# Patient Record
Sex: MALE | Race: WHITE | NOT HISPANIC OR LATINO | Employment: UNEMPLOYED | URBAN - METROPOLITAN AREA
[De-identification: names, ages, dates, MRNs, and addresses within clinical notes are randomized per-mention and may not be internally consistent; named-entity substitution may affect disease eponyms.]

---

## 2018-02-02 ENCOUNTER — HISTORICAL (OUTPATIENT)
Dept: HEMATOLOGY/ONCOLOGY | Facility: CLINIC | Age: 53
End: 2018-02-02

## 2018-02-02 LAB
ABS NEUT (OLG): 6.76 X10(3)/MCL (ref 2.1–9.2)
ALBUMIN SERPL-MCNC: 3.7 GM/DL (ref 3.4–5)
ALBUMIN/GLOB SERPL: 0.9 {RATIO}
ALP SERPL-CCNC: 64 UNIT/L (ref 50–136)
ALT SERPL-CCNC: 85 UNIT/L (ref 12–78)
AST SERPL-CCNC: 52 UNIT/L (ref 15–37)
BASOPHILS # BLD AUTO: 0 X10(3)/MCL (ref 0–0.2)
BASOPHILS NFR BLD AUTO: 0.1 %
BILIRUB SERPL-MCNC: 0.5 MG/DL (ref 0.2–1)
BILIRUBIN DIRECT+TOT PNL SERPL-MCNC: 0.1 MG/DL (ref 0–0.2)
BILIRUBIN DIRECT+TOT PNL SERPL-MCNC: 0.4 MG/DL (ref 0–0.8)
BUN SERPL-MCNC: 20 MG/DL (ref 7–18)
CALCIUM SERPL-MCNC: 9 MG/DL (ref 8.5–10.1)
CEA SERPL-MCNC: 3 NG/ML (ref 0–3)
CHLORIDE SERPL-SCNC: 91 MMOL/L (ref 98–107)
CO2 SERPL-SCNC: 24 MMOL/L (ref 21–32)
CREAT SERPL-MCNC: 1.22 MG/DL (ref 0.7–1.3)
EOSINOPHIL # BLD AUTO: 0.1 X10(3)/MCL (ref 0–0.9)
EOSINOPHIL NFR BLD AUTO: 1 %
ERYTHROCYTE [DISTWIDTH] IN BLOOD BY AUTOMATED COUNT: 13.5 % (ref 11.5–17)
GLOBULIN SER-MCNC: 3.9 GM/DL (ref 2.4–3.5)
GLUCOSE SERPL-MCNC: 90 MG/DL (ref 74–106)
HCT VFR BLD AUTO: 33.9 % (ref 42–52)
HGB BLD-MCNC: 11.8 GM/DL (ref 14–18)
LDH SERPL-CCNC: 182 UNIT/L (ref 87–241)
LYMPHOCYTES # BLD AUTO: 1.6 X10(3)/MCL (ref 0.6–4.6)
LYMPHOCYTES NFR BLD AUTO: 16.6 %
MCH RBC QN AUTO: 31.6 PG (ref 27–31)
MCHC RBC AUTO-ENTMCNC: 34.8 GM/DL (ref 33–36)
MCV RBC AUTO: 90.6 FL (ref 80–94)
MONOCYTES # BLD AUTO: 0.9 X10(3)/MCL (ref 0.1–1.3)
MONOCYTES NFR BLD AUTO: 9.7 %
NEUTROPHILS # BLD AUTO: 6.8 X10(3)/MCL (ref 2.1–9.2)
NEUTROPHILS NFR BLD AUTO: 72.6 %
PLATELET # BLD AUTO: 225 X10(3)/MCL (ref 130–400)
PMV BLD AUTO: 8.3 FL (ref 9.4–12.4)
POTASSIUM SERPL-SCNC: 4.1 MMOL/L (ref 3.5–5.1)
PROT SERPL-MCNC: 7.6 GM/DL (ref 6.4–8.2)
PSA SERPL-MCNC: 1.19 NG/ML (ref 0–4)
RBC # BLD AUTO: 3.74 X10(6)/MCL (ref 4.7–6.1)
SODIUM SERPL-SCNC: 128 MMOL/L (ref 136–145)
TESTOST SERPL-MCNC: <10 NG/DL (ref 241–827)
WBC # SPEC AUTO: 9.3 X10(3)/MCL (ref 4.5–11.5)

## 2018-02-07 ENCOUNTER — HISTORICAL (OUTPATIENT)
Dept: INFUSION THERAPY | Facility: HOSPITAL | Age: 53
End: 2018-02-07

## 2018-02-07 LAB
ABS NEUT (OLG): 3.63 X10(3)/MCL (ref 2.1–9.2)
ANION GAP SERPL CALC-SCNC: 14 MMOL/L
BASOPHILS # BLD AUTO: 0 X10(3)/MCL (ref 0–0.2)
BASOPHILS NFR BLD AUTO: 0.3 %
BUN SERPL-MCNC: 17 MG/DL (ref 7–18)
CHLORIDE SERPL-SCNC: 93 MMOL/L (ref 98–109)
CREAT SERPL-MCNC: 1 MG/DL (ref 0.6–1.3)
EOSINOPHIL # BLD AUTO: 0.1 X10(3)/MCL (ref 0–0.9)
EOSINOPHIL NFR BLD AUTO: 1.9 %
ERYTHROCYTE [DISTWIDTH] IN BLOOD BY AUTOMATED COUNT: 13.5 % (ref 11.5–17)
GLUCOSE SERPL-MCNC: 92 MG/DL (ref 70–105)
HCT VFR BLD AUTO: 33 % (ref 42–52)
HCT VFR BLD CALC: 35 % (ref 38–51)
HGB BLD-MCNC: 11.5 GM/DL (ref 14–18)
HGB BLD-MCNC: 11.9 MG/DL (ref 12–17)
LYMPHOCYTES # BLD AUTO: 1.6 X10(3)/MCL (ref 0.6–4.6)
LYMPHOCYTES NFR BLD AUTO: 25.6 %
MCH RBC QN AUTO: 31.6 PG (ref 27–31)
MCHC RBC AUTO-ENTMCNC: 34.8 GM/DL (ref 33–36)
MCV RBC AUTO: 90.7 FL (ref 80–94)
MONOCYTES # BLD AUTO: 0.8 X10(3)/MCL (ref 0.1–1.3)
MONOCYTES NFR BLD AUTO: 13.5 %
NEUTROPHILS # BLD AUTO: 3.6 X10(3)/MCL (ref 2.1–9.2)
NEUTROPHILS NFR BLD AUTO: 58.7 %
PLATELET # BLD AUTO: 199 X10(3)/MCL (ref 130–400)
PMV BLD AUTO: 8 FL (ref 9.4–12.4)
POC IONIZED CALCIUM: 1.1 MMOL/L (ref 1.12–1.32)
POC TCO2: 26 MMOL/L (ref 22–27)
POTASSIUM BLD-SCNC: 4 MMOL/L (ref 3.5–4.9)
RBC # BLD AUTO: 3.64 X10(6)/MCL (ref 4.7–6.1)
SODIUM BLD-SCNC: 127 MMOL/L (ref 138–146)
WBC # SPEC AUTO: 6.2 X10(3)/MCL (ref 4.5–11.5)

## 2018-02-14 ENCOUNTER — HISTORICAL (OUTPATIENT)
Dept: INFUSION THERAPY | Facility: HOSPITAL | Age: 53
End: 2018-02-14

## 2018-02-14 LAB
ABS NEUT (OLG): ABNORMAL X10(3)/MCL (ref 2.1–9.2)
ALBUMIN SERPL-MCNC: 3.6 GM/DL (ref 3.4–5)
ALP SERPL-CCNC: 85 UNIT/L (ref 50–136)
ALT SERPL-CCNC: 38 UNIT/L (ref 12–78)
ANION GAP SERPL CALC-SCNC: 17 MMOL/L
ANISOCYTOSIS BLD QL SMEAR: 0
AST SERPL-CCNC: 18 UNIT/L (ref 15–37)
BASOPHILS # BLD AUTO: 0 X10(3)/MCL (ref 0–0.2)
BASOPHILS NFR BLD AUTO: 0.2 %
BASOPHILS NFR BLD MANUAL: 1 % (ref 0–2)
BILIRUB SERPL-MCNC: 0.4 MG/DL (ref 0.2–1)
BILIRUBIN DIRECT+TOT PNL SERPL-MCNC: 0.1 MG/DL (ref 0–0.5)
BILIRUBIN DIRECT+TOT PNL SERPL-MCNC: 0.3 MG/DL (ref 0–0.8)
BUN SERPL-MCNC: 11 MG/DL (ref 7–18)
CHLORIDE SERPL-SCNC: 96 MMOL/L (ref 98–109)
CREAT SERPL-MCNC: 1 MG/DL (ref 0.6–1.3)
EOSINOPHIL # BLD AUTO: 0.2 X10(3)/MCL (ref 0–0.9)
EOSINOPHIL NFR BLD AUTO: 1 %
EOSINOPHIL NFR BLD MANUAL: 2 % (ref 0–8)
ERYTHROCYTE [DISTWIDTH] IN BLOOD BY AUTOMATED COUNT: 13.7 % (ref 11.5–17)
GLUCOSE SERPL-MCNC: 88 MG/DL (ref 70–105)
HCT VFR BLD AUTO: 34 % (ref 42–52)
HCT VFR BLD CALC: 33 % (ref 38–51)
HGB BLD-MCNC: 11.2 MG/DL (ref 12–17)
HGB BLD-MCNC: 11.4 GM/DL (ref 14–18)
HYPOCHROMIA BLD QL SMEAR: 0
LIVER PROFILE INTERP: NORMAL
LYMPHOCYTES # BLD AUTO: ABNORMAL X10(3)/MCL (ref 0.6–4.6)
LYMPHOCYTES NFR BLD AUTO: 13 %
LYMPHOCYTES NFR BLD MANUAL: 11 % (ref 13–40)
MACROCYTES BLD QL SMEAR: 0
MCH RBC QN AUTO: 31.8 PG (ref 27–31)
MCHC RBC AUTO-ENTMCNC: 33.5 GM/DL (ref 33–36)
MCV RBC AUTO: 94.7 FL (ref 80–94)
METAMYELOCYTES NFR BLD MANUAL: 3 %
MICROCYTES BLD QL SMEAR: 0
MONOCYTES # BLD AUTO: 2.7 X10(3)/MCL (ref 0.1–1.3)
MONOCYTES NFR BLD AUTO: 12.9 %
MONOCYTES NFR BLD MANUAL: 10 % (ref 2–11)
MYELOCYTES NFR BLD MANUAL: 4 %
NEUTROPHILS # BLD AUTO: 12.47 X10(3)/MCL (ref 1.4–7.9)
NEUTROPHILS # BLD AUTO: ABNORMAL X10(3)/MCL (ref 2.1–9.2)
NEUTROPHILS NFR BLD AUTO: 58 %
NEUTROPHILS NFR BLD MANUAL: 64 % (ref 47–80)
PLATELET # BLD AUTO: 219 X10(3)/MCL (ref 130–400)
PLATELET # BLD EST: NORMAL 10*3/UL
PMV BLD AUTO: 8.6 FL (ref 9.4–12.4)
POC IONIZED CALCIUM: 1.15 MMOL/L (ref 1.12–1.32)
POC TCO2: 26 MMOL/L (ref 22–27)
POIKILOCYTOSIS BLD QL SMEAR: 0
POLYCHROMASIA BLD QL SMEAR: 0
POTASSIUM BLD-SCNC: 3.7 MMOL/L (ref 3.5–4.9)
PROT SERPL-MCNC: 6.9 GM/DL (ref 6.4–8.2)
RBC # BLD AUTO: 3.59 X10(6)/MCL (ref 4.7–6.1)
SODIUM BLD-SCNC: 134 MMOL/L (ref 138–146)
WBC # SPEC AUTO: 21.4 X10(3)/MCL (ref 4.5–11.5)

## 2018-02-26 ENCOUNTER — HISTORICAL (OUTPATIENT)
Dept: ANESTHESIOLOGY | Facility: HOSPITAL | Age: 53
End: 2018-02-26

## 2018-02-27 ENCOUNTER — HISTORICAL (OUTPATIENT)
Dept: ADMINISTRATIVE | Facility: HOSPITAL | Age: 53
End: 2018-02-27

## 2018-02-27 LAB
ABS NEUT (OLG): 8.75 X10(3)/MCL (ref 2.1–9.2)
ALBUMIN SERPL-MCNC: 4 GM/DL (ref 3.4–5)
ALBUMIN/GLOB SERPL: 1.1 RATIO (ref 1.1–2)
ALP SERPL-CCNC: 74 UNIT/L (ref 50–136)
ALT SERPL-CCNC: 37 UNIT/L (ref 12–78)
AST SERPL-CCNC: 12 UNIT/L (ref 15–37)
BASOPHILS # BLD AUTO: 0.1 X10(3)/MCL (ref 0–0.2)
BASOPHILS NFR BLD AUTO: 0.5 %
BILIRUB SERPL-MCNC: 0.6 MG/DL (ref 0.2–1)
BILIRUBIN DIRECT+TOT PNL SERPL-MCNC: 0.1 MG/DL (ref 0–0.5)
BILIRUBIN DIRECT+TOT PNL SERPL-MCNC: 0.5 MG/DL (ref 0–0.8)
BUN SERPL-MCNC: 20 MG/DL (ref 7–18)
CALCIUM SERPL-MCNC: 9.4 MG/DL (ref 8.5–10.1)
CHLORIDE SERPL-SCNC: 101 MMOL/L (ref 98–107)
CO2 SERPL-SCNC: 26 MMOL/L (ref 21–32)
CREAT SERPL-MCNC: 1.52 MG/DL (ref 0.7–1.3)
EOSINOPHIL # BLD AUTO: 0.1 X10(3)/MCL (ref 0–0.9)
EOSINOPHIL NFR BLD AUTO: 0.9 %
ERYTHROCYTE [DISTWIDTH] IN BLOOD BY AUTOMATED COUNT: 14.4 % (ref 11.5–17)
GLOBULIN SER-MCNC: 3.7 GM/DL (ref 2.4–3.5)
GLUCOSE SERPL-MCNC: 115 MG/DL (ref 74–106)
HCT VFR BLD AUTO: 35 % (ref 42–52)
HGB BLD-MCNC: 11.9 GM/DL (ref 14–18)
LYMPHOCYTES # BLD AUTO: 1.7 X10(3)/MCL (ref 0.6–4.6)
LYMPHOCYTES NFR BLD AUTO: 14.2 %
MCH RBC QN AUTO: 31.7 PG (ref 27–31)
MCHC RBC AUTO-ENTMCNC: 34 GM/DL (ref 33–36)
MCV RBC AUTO: 93.3 FL (ref 80–94)
MONOCYTES # BLD AUTO: 1.4 X10(3)/MCL (ref 0.1–1.3)
MONOCYTES NFR BLD AUTO: 11.3 %
NEUTROPHILS # BLD AUTO: 8.8 X10(3)/MCL (ref 2.1–9.2)
NEUTROPHILS NFR BLD AUTO: 73.1 %
PLATELET # BLD AUTO: 363 X10(3)/MCL (ref 130–400)
PMV BLD AUTO: 8.3 FL (ref 9.4–12.4)
POTASSIUM SERPL-SCNC: 4.2 MMOL/L (ref 3.5–5.1)
PROT SERPL-MCNC: 7.7 GM/DL (ref 6.4–8.2)
RBC # BLD AUTO: 3.75 X10(6)/MCL (ref 4.7–6.1)
SODIUM SERPL-SCNC: 135 MMOL/L (ref 136–145)
WBC # SPEC AUTO: 12 X10(3)/MCL (ref 4.5–11.5)

## 2018-02-28 ENCOUNTER — HISTORICAL (OUTPATIENT)
Dept: INFUSION THERAPY | Facility: HOSPITAL | Age: 53
End: 2018-02-28

## 2018-03-14 ENCOUNTER — HISTORICAL (OUTPATIENT)
Dept: INFUSION THERAPY | Facility: HOSPITAL | Age: 53
End: 2018-03-14

## 2018-03-14 LAB
ANION GAP SERPL CALC-SCNC: 16 MMOL/L
BUN SERPL-MCNC: 7 MG/DL (ref 7–18)
CHLORIDE SERPL-SCNC: 97 MMOL/L (ref 98–109)
CREAT SERPL-MCNC: 0.8 MG/DL (ref 0.6–1.3)
GLUCOSE SERPL-MCNC: 104 MG/DL (ref 70–105)
HCT VFR BLD CALC: 29 % (ref 38–51)
HGB BLD-MCNC: 9.9 MG/DL (ref 12–17)
POC IONIZED CALCIUM: 1.08 MMOL/L (ref 1.12–1.32)
POC TCO2: 26 MMOL/L (ref 22–27)
POTASSIUM BLD-SCNC: 4 MMOL/L (ref 3.5–4.9)
SODIUM BLD-SCNC: 134 MMOL/L (ref 138–146)

## 2018-03-20 ENCOUNTER — HISTORICAL (OUTPATIENT)
Dept: ADMINISTRATIVE | Facility: HOSPITAL | Age: 53
End: 2018-03-20

## 2018-03-20 LAB
ABS NEUT (OLG): 9.96 X10(3)/MCL (ref 2.1–9.2)
ALBUMIN SERPL-MCNC: 3.7 GM/DL (ref 3.4–5)
ALBUMIN/GLOB SERPL: 1 RATIO (ref 1.1–2)
ALP SERPL-CCNC: 75 UNIT/L (ref 50–136)
ALT SERPL-CCNC: 31 UNIT/L (ref 12–78)
AST SERPL-CCNC: 14 UNIT/L (ref 15–37)
BASOPHILS # BLD AUTO: 0 X10(3)/MCL (ref 0–0.2)
BASOPHILS NFR BLD AUTO: 0.2 %
BILIRUB SERPL-MCNC: 0.4 MG/DL (ref 0.2–1)
BILIRUBIN DIRECT+TOT PNL SERPL-MCNC: 0.1 MG/DL (ref 0–0.5)
BILIRUBIN DIRECT+TOT PNL SERPL-MCNC: 0.3 MG/DL (ref 0–0.8)
BUN SERPL-MCNC: 15 MG/DL (ref 7–18)
CALCIUM SERPL-MCNC: 9.5 MG/DL (ref 8.5–10.1)
CHLORIDE SERPL-SCNC: 99 MMOL/L (ref 98–107)
CO2 SERPL-SCNC: 27 MMOL/L (ref 21–32)
CREAT SERPL-MCNC: 1.14 MG/DL (ref 0.7–1.3)
EOSINOPHIL # BLD AUTO: 0 X10(3)/MCL (ref 0–0.9)
EOSINOPHIL NFR BLD AUTO: 0.2 %
ERYTHROCYTE [DISTWIDTH] IN BLOOD BY AUTOMATED COUNT: 14.8 % (ref 11.5–17)
GLOBULIN SER-MCNC: 3.8 GM/DL (ref 2.4–3.5)
GLUCOSE SERPL-MCNC: 90 MG/DL (ref 74–106)
HCT VFR BLD AUTO: 30.3 % (ref 42–52)
HGB BLD-MCNC: 10.3 GM/DL (ref 14–18)
LYMPHOCYTES # BLD AUTO: 1.4 X10(3)/MCL (ref 0.6–4.6)
LYMPHOCYTES NFR BLD AUTO: 10.6 %
MCH RBC QN AUTO: 31.8 PG (ref 27–31)
MCHC RBC AUTO-ENTMCNC: 34 GM/DL (ref 33–36)
MCV RBC AUTO: 93.5 FL (ref 80–94)
MONOCYTES # BLD AUTO: 1.3 X10(3)/MCL (ref 0.1–1.3)
MONOCYTES NFR BLD AUTO: 10.6 %
NEUTROPHILS # BLD AUTO: 10 X10(3)/MCL (ref 2.1–9.2)
NEUTROPHILS NFR BLD AUTO: 78.4 %
PLATELET # BLD AUTO: 210 X10(3)/MCL (ref 130–400)
PMV BLD AUTO: 9.4 FL (ref 9.4–12.4)
POTASSIUM SERPL-SCNC: 4.3 MMOL/L (ref 3.5–5.1)
PROT SERPL-MCNC: 7.5 GM/DL (ref 6.4–8.2)
PSA SERPL-MCNC: 1.17 NG/ML (ref 0–4)
RBC # BLD AUTO: 3.24 X10(6)/MCL (ref 4.7–6.1)
SODIUM SERPL-SCNC: 132 MMOL/L (ref 136–145)
WBC # SPEC AUTO: 12.7 X10(3)/MCL (ref 4.5–11.5)

## 2018-03-21 ENCOUNTER — HISTORICAL (OUTPATIENT)
Dept: INFUSION THERAPY | Facility: HOSPITAL | Age: 53
End: 2018-03-21

## 2018-04-10 ENCOUNTER — HISTORICAL (OUTPATIENT)
Dept: ADMINISTRATIVE | Facility: HOSPITAL | Age: 53
End: 2018-04-10

## 2018-04-10 LAB
ABS NEUT (OLG): 10.47 X10(3)/MCL (ref 2.1–9.2)
ALBUMIN SERPL-MCNC: 3.8 GM/DL (ref 3.4–5)
ALBUMIN/GLOB SERPL: 1.2 {RATIO}
ALP SERPL-CCNC: 75 UNIT/L (ref 50–136)
ALT SERPL-CCNC: 26 UNIT/L (ref 12–78)
AST SERPL-CCNC: 13 UNIT/L (ref 15–37)
BASOPHILS # BLD AUTO: 0 X10(3)/MCL (ref 0–0.2)
BASOPHILS NFR BLD AUTO: 0.2 %
BILIRUB SERPL-MCNC: 0.3 MG/DL (ref 0.2–1)
BILIRUBIN DIRECT+TOT PNL SERPL-MCNC: 0.1 MG/DL (ref 0–0.2)
BILIRUBIN DIRECT+TOT PNL SERPL-MCNC: 0.2 MG/DL (ref 0–0.8)
BUN SERPL-MCNC: 12 MG/DL (ref 7–18)
CALCIUM SERPL-MCNC: 8.2 MG/DL (ref 8.5–10.1)
CHLORIDE SERPL-SCNC: 105 MMOL/L (ref 98–107)
CO2 SERPL-SCNC: 25 MMOL/L (ref 21–32)
CREAT SERPL-MCNC: 0.99 MG/DL (ref 0.7–1.3)
EOSINOPHIL # BLD AUTO: 0.1 X10(3)/MCL (ref 0–0.9)
EOSINOPHIL NFR BLD AUTO: 0.5 %
ERYTHROCYTE [DISTWIDTH] IN BLOOD BY AUTOMATED COUNT: 14.9 % (ref 11.5–17)
GLOBULIN SER-MCNC: 3.3 GM/DL (ref 2.4–3.5)
GLUCOSE SERPL-MCNC: 123 MG/DL (ref 74–106)
HCT VFR BLD AUTO: 30.7 % (ref 42–52)
HGB BLD-MCNC: 10 GM/DL (ref 14–18)
LYMPHOCYTES # BLD AUTO: 1.4 X10(3)/MCL (ref 0.6–4.6)
LYMPHOCYTES NFR BLD AUTO: 10.4 %
MCH RBC QN AUTO: 31.7 PG (ref 27–31)
MCHC RBC AUTO-ENTMCNC: 32.6 GM/DL (ref 33–36)
MCV RBC AUTO: 97.5 FL (ref 80–94)
MONOCYTES # BLD AUTO: 1.2 X10(3)/MCL (ref 0.1–1.3)
MONOCYTES NFR BLD AUTO: 9.2 %
NEUTROPHILS # BLD AUTO: 10.5 X10(3)/MCL (ref 2.1–9.2)
NEUTROPHILS NFR BLD AUTO: 79.7 %
PLATELET # BLD AUTO: 285 X10(3)/MCL (ref 130–400)
PMV BLD AUTO: 8.1 FL (ref 9.4–12.4)
POTASSIUM SERPL-SCNC: 4.6 MMOL/L (ref 3.5–5.1)
PROT SERPL-MCNC: 7.1 GM/DL (ref 6.4–8.2)
PSA SERPL-MCNC: 1.22 NG/ML (ref 0–4)
RBC # BLD AUTO: 3.15 X10(6)/MCL (ref 4.7–6.1)
SODIUM SERPL-SCNC: 137 MMOL/L (ref 136–145)
WBC # SPEC AUTO: 13.2 X10(3)/MCL (ref 4.5–11.5)

## 2018-04-11 ENCOUNTER — HISTORICAL (OUTPATIENT)
Dept: INFUSION THERAPY | Facility: HOSPITAL | Age: 53
End: 2018-04-11

## 2018-04-16 ENCOUNTER — HISTORICAL (OUTPATIENT)
Dept: INFUSION THERAPY | Facility: HOSPITAL | Age: 53
End: 2018-04-16

## 2018-04-30 ENCOUNTER — HISTORICAL (OUTPATIENT)
Dept: RADIOLOGY | Facility: HOSPITAL | Age: 53
End: 2018-04-30

## 2018-05-01 ENCOUNTER — HISTORICAL (OUTPATIENT)
Dept: ADMINISTRATIVE | Facility: HOSPITAL | Age: 53
End: 2018-05-01

## 2018-05-01 LAB
ABS NEUT (OLG): 6.86 X10(3)/MCL (ref 2.1–9.2)
ALBUMIN SERPL-MCNC: 3.5 GM/DL (ref 3.4–5)
ALBUMIN/GLOB SERPL: 1 RATIO (ref 1.1–2)
ALP SERPL-CCNC: 72 UNIT/L (ref 50–136)
ALT SERPL-CCNC: 25 UNIT/L (ref 12–78)
AST SERPL-CCNC: 13 UNIT/L (ref 15–37)
BASOPHILS # BLD AUTO: 0 X10(3)/MCL (ref 0–0.2)
BASOPHILS NFR BLD AUTO: 0.5 %
BILIRUB SERPL-MCNC: 0.5 MG/DL (ref 0.2–1)
BILIRUBIN DIRECT+TOT PNL SERPL-MCNC: 0.1 MG/DL (ref 0–0.5)
BILIRUBIN DIRECT+TOT PNL SERPL-MCNC: 0.4 MG/DL (ref 0–0.8)
BUN SERPL-MCNC: 14 MG/DL (ref 7–18)
CALCIUM SERPL-MCNC: 8.8 MG/DL (ref 8.5–10.1)
CHLORIDE SERPL-SCNC: 101 MMOL/L (ref 98–107)
CO2 SERPL-SCNC: 24 MMOL/L (ref 21–32)
CREAT SERPL-MCNC: 1.03 MG/DL (ref 0.7–1.3)
EOSINOPHIL # BLD AUTO: 0 X10(3)/MCL (ref 0–0.9)
EOSINOPHIL NFR BLD AUTO: 0.3 %
ERYTHROCYTE [DISTWIDTH] IN BLOOD BY AUTOMATED COUNT: 15.4 % (ref 11.5–17)
GLOBULIN SER-MCNC: 3.6 GM/DL (ref 2.4–3.5)
GLUCOSE SERPL-MCNC: 112 MG/DL (ref 74–106)
HCT VFR BLD AUTO: 27.8 % (ref 42–52)
HGB BLD-MCNC: 9.4 GM/DL (ref 14–18)
LYMPHOCYTES # BLD AUTO: 0.8 X10(3)/MCL (ref 0.6–4.6)
LYMPHOCYTES NFR BLD AUTO: 9.2 %
MCH RBC QN AUTO: 32.5 PG (ref 27–31)
MCHC RBC AUTO-ENTMCNC: 33.8 GM/DL (ref 33–36)
MCV RBC AUTO: 96.2 FL (ref 80–94)
MONOCYTES # BLD AUTO: 1.1 X10(3)/MCL (ref 0.1–1.3)
MONOCYTES NFR BLD AUTO: 12 %
NEUTROPHILS # BLD AUTO: 6.9 X10(3)/MCL (ref 2.1–9.2)
NEUTROPHILS NFR BLD AUTO: 78 %
PLATELET # BLD AUTO: 315 X10(3)/MCL (ref 130–400)
PMV BLD AUTO: 8.1 FL (ref 9.4–12.4)
POTASSIUM SERPL-SCNC: 4.1 MMOL/L (ref 3.5–5.1)
PROT SERPL-MCNC: 7.1 GM/DL (ref 6.4–8.2)
PSA SERPL-MCNC: 1.26 NG/ML (ref 0–4)
RBC # BLD AUTO: 2.89 X10(6)/MCL (ref 4.7–6.1)
SODIUM SERPL-SCNC: 136 MMOL/L (ref 136–145)
WBC # SPEC AUTO: 8.8 X10(3)/MCL (ref 4.5–11.5)

## 2018-05-02 ENCOUNTER — HISTORICAL (OUTPATIENT)
Dept: INFUSION THERAPY | Facility: HOSPITAL | Age: 53
End: 2018-05-02

## 2018-05-07 ENCOUNTER — HISTORICAL (OUTPATIENT)
Dept: INFUSION THERAPY | Facility: HOSPITAL | Age: 53
End: 2018-05-07

## 2018-05-07 LAB
ABS NEUT (OLG): 9.69 X10(3)/MCL (ref 2.1–9.2)
ALBUMIN SERPL-MCNC: 3.6 GM/DL (ref 3.4–5)
ALP SERPL-CCNC: 83 UNIT/L (ref 50–136)
ALT SERPL-CCNC: 23 UNIT/L (ref 12–78)
ANION GAP SERPL CALC-SCNC: 16 MMOL/L
AST SERPL-CCNC: 9 UNIT/L (ref 15–37)
BASOPHILS # BLD AUTO: 0.1 X10(3)/MCL (ref 0–0.2)
BASOPHILS NFR BLD AUTO: 0.7 %
BILIRUB SERPL-MCNC: 0.6 MG/DL (ref 0.2–1)
BILIRUBIN DIRECT+TOT PNL SERPL-MCNC: 0.1 MG/DL (ref 0–0.5)
BILIRUBIN DIRECT+TOT PNL SERPL-MCNC: 0.5 MG/DL (ref 0–0.8)
BUN SERPL-MCNC: 20 MG/DL (ref 7–18)
BUN SERPL-MCNC: 21 MG/DL (ref 7–18)
CALCIUM SERPL-MCNC: 8.4 MG/DL (ref 8.5–10.1)
CHLORIDE SERPL-SCNC: 101 MMOL/L (ref 98–107)
CHLORIDE SERPL-SCNC: 99 MMOL/L (ref 98–109)
CO2 SERPL-SCNC: 25 MMOL/L (ref 21–32)
CREAT SERPL-MCNC: 1 MG/DL (ref 0.6–1.3)
CREAT SERPL-MCNC: 1.03 MG/DL (ref 0.7–1.3)
CREAT/UREA NIT SERPL: 19.4
EOSINOPHIL # BLD AUTO: 0.1 X10(3)/MCL (ref 0–0.9)
EOSINOPHIL NFR BLD AUTO: 1 %
ERYTHROCYTE [DISTWIDTH] IN BLOOD BY AUTOMATED COUNT: 15.9 % (ref 11.5–17)
GLUCOSE SERPL-MCNC: 67 MG/DL (ref 74–106)
GLUCOSE SERPL-MCNC: 94 MG/DL (ref 70–105)
HCT VFR BLD AUTO: 27.8 % (ref 42–52)
HCT VFR BLD CALC: 28 % (ref 38–51)
HGB BLD-MCNC: 9.2 GM/DL (ref 14–18)
HGB BLD-MCNC: 9.5 MG/DL (ref 12–17)
LIVER PROFILE INTERP: ABNORMAL
LYMPHOCYTES # BLD AUTO: 1.3 X10(3)/MCL (ref 0.6–4.6)
LYMPHOCYTES NFR BLD AUTO: 11 %
MCH RBC QN AUTO: 32.6 PG (ref 27–31)
MCHC RBC AUTO-ENTMCNC: 33.1 GM/DL (ref 33–36)
MCV RBC AUTO: 98.6 FL (ref 80–94)
MONOCYTES # BLD AUTO: 0.4 X10(3)/MCL (ref 0.1–1.3)
MONOCYTES NFR BLD AUTO: 3.6 %
NEUTROPHILS # BLD AUTO: 9.7 X10(3)/MCL (ref 2.1–9.2)
NEUTROPHILS NFR BLD AUTO: 83.7 %
PLATELET # BLD AUTO: 209 X10(3)/MCL (ref 130–400)
PMV BLD AUTO: 8.6 FL (ref 9.4–12.4)
POC IONIZED CALCIUM: 1.14 MMOL/L (ref 1.12–1.32)
POC TCO2: 25 MMOL/L (ref 22–27)
POTASSIUM BLD-SCNC: 3.8 MMOL/L (ref 3.5–4.9)
POTASSIUM SERPL-SCNC: 4.1 MMOL/L (ref 3.5–5.1)
PROT SERPL-MCNC: 7 GM/DL (ref 6.4–8.2)
RBC # BLD AUTO: 2.82 X10(6)/MCL (ref 4.7–6.1)
SODIUM BLD-SCNC: 135 MMOL/L (ref 138–146)
SODIUM SERPL-SCNC: 134 MMOL/L (ref 136–145)
WBC # SPEC AUTO: 11.6 X10(3)/MCL (ref 4.5–11.5)

## 2018-05-22 ENCOUNTER — HISTORICAL (OUTPATIENT)
Dept: ADMINISTRATIVE | Facility: HOSPITAL | Age: 53
End: 2018-05-22

## 2018-05-22 LAB
ABS NEUT (OLG): 7.68 X10(3)/MCL (ref 2.1–9.2)
ANION GAP SERPL CALC-SCNC: 17 MMOL/L
BASOPHILS # BLD AUTO: 0 X10(3)/MCL (ref 0–0.2)
BASOPHILS NFR BLD AUTO: 0.3 %
BUN SERPL-MCNC: 25 MG/DL (ref 7–18)
CHLORIDE SERPL-SCNC: 95 MMOL/L (ref 98–109)
CREAT SERPL-MCNC: 1.1 MG/DL (ref 0.6–1.3)
EOSINOPHIL # BLD AUTO: 0 X10(3)/MCL (ref 0–0.9)
EOSINOPHIL NFR BLD AUTO: 0.5 %
ERYTHROCYTE [DISTWIDTH] IN BLOOD BY AUTOMATED COUNT: 14.7 % (ref 11.5–17)
GLUCOSE SERPL-MCNC: 68 MG/DL (ref 70–105)
HCT VFR BLD AUTO: 28.2 % (ref 42–52)
HCT VFR BLD CALC: 29 % (ref 38–51)
HGB BLD-MCNC: 9.9 GM/DL (ref 14–18)
HGB BLD-MCNC: 9.9 MG/DL (ref 12–17)
LYMPHOCYTES # BLD AUTO: 1.3 X10(3)/MCL (ref 0.6–4.6)
LYMPHOCYTES NFR BLD AUTO: 12.4 %
MCH RBC QN AUTO: 33.1 PG (ref 27–31)
MCHC RBC AUTO-ENTMCNC: 35.1 GM/DL (ref 33–36)
MCV RBC AUTO: 94.3 FL (ref 80–94)
MONOCYTES # BLD AUTO: 1.6 X10(3)/MCL (ref 0.1–1.3)
MONOCYTES NFR BLD AUTO: 14.8 %
NEUTROPHILS # BLD AUTO: 7.7 X10(3)/MCL (ref 2.1–9.2)
NEUTROPHILS NFR BLD AUTO: 72 %
PLATELET # BLD AUTO: 264 X10(3)/MCL (ref 130–400)
PMV BLD AUTO: 7.9 FL (ref 9.4–12.4)
POC IONIZED CALCIUM: 1.19 MMOL/L (ref 1.12–1.32)
POC TCO2: 24 MMOL/L (ref 22–27)
POTASSIUM BLD-SCNC: 3.7 MMOL/L (ref 3.5–4.9)
PSA SERPL-MCNC: 1.36 NG/ML (ref 0–4)
RBC # BLD AUTO: 2.99 X10(6)/MCL (ref 4.7–6.1)
SODIUM BLD-SCNC: 131 MMOL/L (ref 138–146)
WBC # SPEC AUTO: 10.7 X10(3)/MCL (ref 4.5–11.5)

## 2018-05-23 ENCOUNTER — HISTORICAL (OUTPATIENT)
Dept: INFUSION THERAPY | Facility: HOSPITAL | Age: 53
End: 2018-05-23

## 2018-05-25 ENCOUNTER — HISTORICAL (OUTPATIENT)
Dept: INFUSION THERAPY | Facility: HOSPITAL | Age: 53
End: 2018-05-25

## 2018-06-12 ENCOUNTER — HISTORICAL (OUTPATIENT)
Dept: ADMINISTRATIVE | Facility: HOSPITAL | Age: 53
End: 2018-06-12

## 2018-06-12 LAB
ABS NEUT (OLG): 6.16 X10(3)/MCL (ref 2.1–9.2)
ALBUMIN SERPL-MCNC: 3.8 GM/DL (ref 3.4–5)
ALBUMIN/GLOB SERPL: 1 RATIO (ref 1.1–2)
ALP SERPL-CCNC: 67 UNIT/L (ref 50–136)
ALT SERPL-CCNC: 31 UNIT/L (ref 12–78)
AST SERPL-CCNC: 14 UNIT/L (ref 15–37)
BASOPHILS # BLD AUTO: 0 X10(3)/MCL (ref 0–0.2)
BASOPHILS NFR BLD AUTO: 0.3 %
BILIRUB SERPL-MCNC: 0.3 MG/DL (ref 0.2–1)
BILIRUBIN DIRECT+TOT PNL SERPL-MCNC: 0.1 MG/DL (ref 0–0.5)
BILIRUBIN DIRECT+TOT PNL SERPL-MCNC: 0.2 MG/DL (ref 0–0.8)
BUN SERPL-MCNC: 16 MG/DL (ref 7–18)
CALCIUM SERPL-MCNC: 8.8 MG/DL (ref 8.5–10.1)
CHLORIDE SERPL-SCNC: 97 MMOL/L (ref 98–107)
CO2 SERPL-SCNC: 26 MMOL/L (ref 21–32)
CREAT SERPL-MCNC: 0.99 MG/DL (ref 0.7–1.3)
EOSINOPHIL # BLD AUTO: 0.1 X10(3)/MCL (ref 0–0.9)
EOSINOPHIL NFR BLD AUTO: 0.8 %
ERYTHROCYTE [DISTWIDTH] IN BLOOD BY AUTOMATED COUNT: 14.1 % (ref 11.5–17)
GLOBULIN SER-MCNC: 3.7 GM/DL (ref 2.4–3.5)
GLUCOSE SERPL-MCNC: 98 MG/DL (ref 74–106)
HCT VFR BLD AUTO: 30.2 % (ref 42–52)
HGB BLD-MCNC: 10.3 GM/DL (ref 14–18)
LYMPHOCYTES # BLD AUTO: 1.5 X10(3)/MCL (ref 0.6–4.6)
LYMPHOCYTES NFR BLD AUTO: 17.1 %
MCH RBC QN AUTO: 32.8 PG (ref 27–31)
MCHC RBC AUTO-ENTMCNC: 34.1 GM/DL (ref 33–36)
MCV RBC AUTO: 96.2 FL (ref 80–94)
MONOCYTES # BLD AUTO: 1.2 X10(3)/MCL (ref 0.1–1.3)
MONOCYTES NFR BLD AUTO: 13 %
NEUTROPHILS # BLD AUTO: 6.2 X10(3)/MCL (ref 2.1–9.2)
NEUTROPHILS NFR BLD AUTO: 68.8 %
PLATELET # BLD AUTO: 329 X10(3)/MCL (ref 130–400)
PMV BLD AUTO: 7.9 FL (ref 9.4–12.4)
POTASSIUM SERPL-SCNC: 4 MMOL/L (ref 3.5–5.1)
PROT SERPL-MCNC: 7.5 GM/DL (ref 6.4–8.2)
PSA SERPL-MCNC: 1.02 NG/ML (ref 0–4)
RBC # BLD AUTO: 3.14 X10(6)/MCL (ref 4.7–6.1)
SODIUM SERPL-SCNC: 129 MMOL/L (ref 136–145)
WBC # SPEC AUTO: 9 X10(3)/MCL (ref 4.5–11.5)

## 2018-06-13 ENCOUNTER — HISTORICAL (OUTPATIENT)
Dept: INFUSION THERAPY | Facility: HOSPITAL | Age: 53
End: 2018-06-13

## 2018-06-15 ENCOUNTER — HISTORICAL (OUTPATIENT)
Dept: INFUSION THERAPY | Facility: HOSPITAL | Age: 53
End: 2018-06-15

## 2018-07-03 ENCOUNTER — HISTORICAL (OUTPATIENT)
Dept: ADMINISTRATIVE | Facility: HOSPITAL | Age: 53
End: 2018-07-03

## 2018-07-03 LAB
ABS NEUT (OLG): 8.19 X10(3)/MCL (ref 2.1–9.2)
ALBUMIN SERPL-MCNC: 3.5 GM/DL (ref 3.4–5)
ALBUMIN/GLOB SERPL: 0.9 {RATIO}
ALP SERPL-CCNC: 76 UNIT/L (ref 50–136)
ALT SERPL-CCNC: 25 UNIT/L (ref 12–78)
AST SERPL-CCNC: 9 UNIT/L (ref 15–37)
BASOPHILS # BLD AUTO: 0.1 X10(3)/MCL (ref 0–0.2)
BASOPHILS NFR BLD AUTO: 0.5 %
BILIRUB SERPL-MCNC: 0.3 MG/DL (ref 0.2–1)
BILIRUBIN DIRECT+TOT PNL SERPL-MCNC: 0.1 MG/DL (ref 0–0.2)
BILIRUBIN DIRECT+TOT PNL SERPL-MCNC: 0.2 MG/DL (ref 0–0.8)
BUN SERPL-MCNC: 25 MG/DL (ref 7–18)
CALCIUM SERPL-MCNC: 8.6 MG/DL (ref 8.5–10.1)
CHLORIDE SERPL-SCNC: 97 MMOL/L (ref 98–107)
CO2 SERPL-SCNC: 24 MMOL/L (ref 21–32)
CREAT SERPL-MCNC: 1.3 MG/DL (ref 0.7–1.3)
EOSINOPHIL # BLD AUTO: 0.1 X10(3)/MCL (ref 0–0.9)
EOSINOPHIL NFR BLD AUTO: 0.7 %
ERYTHROCYTE [DISTWIDTH] IN BLOOD BY AUTOMATED COUNT: 13.6 % (ref 11.5–17)
GLOBULIN SER-MCNC: 3.7 GM/DL (ref 2.4–3.5)
GLUCOSE SERPL-MCNC: 77 MG/DL (ref 74–106)
HCT VFR BLD AUTO: 29.6 % (ref 42–52)
HGB BLD-MCNC: 10 GM/DL (ref 14–18)
LYMPHOCYTES # BLD AUTO: 2.2 X10(3)/MCL (ref 0.6–4.6)
LYMPHOCYTES NFR BLD AUTO: 18.1 %
MCH RBC QN AUTO: 32.4 PG (ref 27–31)
MCHC RBC AUTO-ENTMCNC: 33.8 GM/DL (ref 33–36)
MCV RBC AUTO: 95.8 FL (ref 80–94)
MONOCYTES # BLD AUTO: 1.4 X10(3)/MCL (ref 0.1–1.3)
MONOCYTES NFR BLD AUTO: 12 %
NEUTROPHILS # BLD AUTO: 8.2 X10(3)/MCL (ref 2.1–9.2)
NEUTROPHILS NFR BLD AUTO: 68.7 %
PLATELET # BLD AUTO: 282 X10(3)/MCL (ref 130–400)
PMV BLD AUTO: 8 FL (ref 9.4–12.4)
POTASSIUM SERPL-SCNC: 4.2 MMOL/L (ref 3.5–5.1)
PROT SERPL-MCNC: 7.2 GM/DL (ref 6.4–8.2)
PSA SERPL-MCNC: 1.04 NG/ML (ref 0–4)
RBC # BLD AUTO: 3.09 X10(6)/MCL (ref 4.7–6.1)
SODIUM SERPL-SCNC: 134 MMOL/L (ref 136–145)
WBC # SPEC AUTO: 11.9 X10(3)/MCL (ref 4.5–11.5)

## 2018-07-05 ENCOUNTER — HISTORICAL (OUTPATIENT)
Dept: INFUSION THERAPY | Facility: HOSPITAL | Age: 53
End: 2018-07-05

## 2018-07-24 ENCOUNTER — HISTORICAL (OUTPATIENT)
Dept: ADMINISTRATIVE | Facility: HOSPITAL | Age: 53
End: 2018-07-24

## 2018-07-24 LAB
ABS NEUT (OLG): 15.09 X10(3)/MCL (ref 2.1–9.2)
ALBUMIN SERPL-MCNC: 3.6 GM/DL (ref 3.4–5)
ALBUMIN/GLOB SERPL: 0.9 {RATIO}
ALP SERPL-CCNC: 75 UNIT/L (ref 50–136)
ALT SERPL-CCNC: 29 UNIT/L (ref 12–78)
AST SERPL-CCNC: 14 UNIT/L (ref 15–37)
BASOPHILS # BLD AUTO: 0 X10(3)/MCL (ref 0–0.2)
BASOPHILS NFR BLD AUTO: 0.2 %
BILIRUB SERPL-MCNC: 0.2 MG/DL (ref 0.2–1)
BILIRUBIN DIRECT+TOT PNL SERPL-MCNC: 0.1 MG/DL (ref 0–0.2)
BILIRUBIN DIRECT+TOT PNL SERPL-MCNC: 0.1 MG/DL (ref 0–0.8)
BUN SERPL-MCNC: 17 MG/DL (ref 7–18)
CALCIUM SERPL-MCNC: 8.9 MG/DL (ref 8.5–10.1)
CHLORIDE SERPL-SCNC: 103 MMOL/L (ref 98–107)
CO2 SERPL-SCNC: 20 MMOL/L (ref 21–32)
CREAT SERPL-MCNC: 1.18 MG/DL (ref 0.7–1.3)
EOSINOPHIL # BLD AUTO: 0.1 X10(3)/MCL (ref 0–0.9)
EOSINOPHIL NFR BLD AUTO: 0.4 %
ERYTHROCYTE [DISTWIDTH] IN BLOOD BY AUTOMATED COUNT: 14.3 % (ref 11.5–17)
GLOBULIN SER-MCNC: 4 GM/DL (ref 2.4–3.5)
GLUCOSE SERPL-MCNC: 135 MG/DL (ref 74–106)
HCT VFR BLD AUTO: 30.8 % (ref 42–52)
HGB BLD-MCNC: 10.4 GM/DL (ref 14–18)
LYMPHOCYTES # BLD AUTO: 1.7 X10(3)/MCL (ref 0.6–4.6)
LYMPHOCYTES NFR BLD AUTO: 9.1 %
MCH RBC QN AUTO: 32.4 PG (ref 27–31)
MCHC RBC AUTO-ENTMCNC: 33.8 GM/DL (ref 33–36)
MCV RBC AUTO: 96 FL (ref 80–94)
MONOCYTES # BLD AUTO: 1.4 X10(3)/MCL (ref 0.1–1.3)
MONOCYTES NFR BLD AUTO: 7.8 %
NEUTROPHILS # BLD AUTO: 15.1 X10(3)/MCL (ref 2.1–9.2)
NEUTROPHILS NFR BLD AUTO: 82.5 %
PLATELET # BLD AUTO: 351 X10(3)/MCL (ref 130–400)
PMV BLD AUTO: 8.7 FL (ref 9.4–12.4)
POTASSIUM SERPL-SCNC: 4.1 MMOL/L (ref 3.5–5.1)
PROT SERPL-MCNC: 7.6 GM/DL (ref 6.4–8.2)
PSA SERPL-MCNC: 1.09 NG/ML (ref 0–4)
RBC # BLD AUTO: 3.21 X10(6)/MCL (ref 4.7–6.1)
SODIUM SERPL-SCNC: 137 MMOL/L (ref 136–145)
WBC # SPEC AUTO: 18.3 X10(3)/MCL (ref 4.5–11.5)

## 2018-07-25 ENCOUNTER — HISTORICAL (OUTPATIENT)
Dept: INFUSION THERAPY | Facility: HOSPITAL | Age: 53
End: 2018-07-25

## 2018-07-27 ENCOUNTER — HISTORICAL (OUTPATIENT)
Dept: INFUSION THERAPY | Facility: HOSPITAL | Age: 53
End: 2018-07-27

## 2018-08-09 ENCOUNTER — HISTORICAL (OUTPATIENT)
Dept: ANESTHESIOLOGY | Facility: HOSPITAL | Age: 53
End: 2018-08-09

## 2018-08-14 ENCOUNTER — HISTORICAL (OUTPATIENT)
Dept: ADMINISTRATIVE | Facility: HOSPITAL | Age: 53
End: 2018-08-14

## 2018-08-14 LAB
ABS NEUT (OLG): 7.31 X10(3)/MCL (ref 2.1–9.2)
ALBUMIN SERPL-MCNC: 3.6 GM/DL (ref 3.4–5)
ALBUMIN/GLOB SERPL: 0.9 {RATIO}
ALP SERPL-CCNC: 59 UNIT/L (ref 50–136)
ALT SERPL-CCNC: 29 UNIT/L (ref 12–78)
AST SERPL-CCNC: 10 UNIT/L (ref 15–37)
BASOPHILS # BLD AUTO: 0 X10(3)/MCL (ref 0–0.2)
BASOPHILS NFR BLD AUTO: 0.4 %
BILIRUB SERPL-MCNC: 0.3 MG/DL (ref 0.2–1)
BILIRUBIN DIRECT+TOT PNL SERPL-MCNC: 0.1 MG/DL (ref 0–0.2)
BILIRUBIN DIRECT+TOT PNL SERPL-MCNC: 0.2 MG/DL (ref 0–0.8)
BUN SERPL-MCNC: 13 MG/DL (ref 7–18)
CALCIUM SERPL-MCNC: 8.8 MG/DL (ref 8.5–10.1)
CHLORIDE SERPL-SCNC: 94 MMOL/L (ref 98–107)
CO2 SERPL-SCNC: 24 MMOL/L (ref 21–32)
CREAT SERPL-MCNC: 1.13 MG/DL (ref 0.7–1.3)
EOSINOPHIL # BLD AUTO: 0.1 X10(3)/MCL (ref 0–0.9)
EOSINOPHIL NFR BLD AUTO: 0.6 %
ERYTHROCYTE [DISTWIDTH] IN BLOOD BY AUTOMATED COUNT: 13.5 % (ref 11.5–17)
GLOBULIN SER-MCNC: 3.8 GM/DL (ref 2.4–3.5)
GLUCOSE SERPL-MCNC: 70 MG/DL (ref 74–106)
HCT VFR BLD AUTO: 31.9 % (ref 42–52)
HGB BLD-MCNC: 10.6 GM/DL (ref 14–18)
LYMPHOCYTES # BLD AUTO: 2.2 X10(3)/MCL (ref 0.6–4.6)
LYMPHOCYTES NFR BLD AUTO: 20.2 %
MCH RBC QN AUTO: 31.4 PG (ref 27–31)
MCHC RBC AUTO-ENTMCNC: 33.2 GM/DL (ref 33–36)
MCV RBC AUTO: 94.4 FL (ref 80–94)
MONOCYTES # BLD AUTO: 1.2 X10(3)/MCL (ref 0.1–1.3)
MONOCYTES NFR BLD AUTO: 11.5 %
NEUTROPHILS # BLD AUTO: 7.3 X10(3)/MCL (ref 2.1–9.2)
NEUTROPHILS NFR BLD AUTO: 67.3 %
PLATELET # BLD AUTO: 330 X10(3)/MCL (ref 130–400)
PMV BLD AUTO: 8.2 FL (ref 9.4–12.4)
POTASSIUM SERPL-SCNC: 4.5 MMOL/L (ref 3.5–5.1)
PROT SERPL-MCNC: 7.4 GM/DL (ref 6.4–8.2)
PSA SERPL-MCNC: 1.16 NG/ML (ref 0–4)
RBC # BLD AUTO: 3.38 X10(6)/MCL (ref 4.7–6.1)
SODIUM SERPL-SCNC: 130 MMOL/L (ref 136–145)
WBC # SPEC AUTO: 10.9 X10(3)/MCL (ref 4.5–11.5)

## 2018-08-15 ENCOUNTER — HISTORICAL (OUTPATIENT)
Dept: INFUSION THERAPY | Facility: HOSPITAL | Age: 53
End: 2018-08-15

## 2018-08-17 ENCOUNTER — HISTORICAL (OUTPATIENT)
Dept: INFUSION THERAPY | Facility: HOSPITAL | Age: 53
End: 2018-08-17

## 2018-09-04 ENCOUNTER — HISTORICAL (OUTPATIENT)
Dept: ADMINISTRATIVE | Facility: HOSPITAL | Age: 53
End: 2018-09-04

## 2018-09-04 LAB
ABS NEUT (OLG): 10.56 X10(3)/MCL (ref 2.1–9.2)
ALBUMIN SERPL-MCNC: 3.3 GM/DL (ref 3.4–5)
ALBUMIN/GLOB SERPL: 0.9 RATIO (ref 1.1–2)
ALP SERPL-CCNC: 78 UNIT/L (ref 50–136)
ALT SERPL-CCNC: 30 UNIT/L (ref 12–78)
AST SERPL-CCNC: 14 UNIT/L (ref 15–37)
BASOPHILS # BLD AUTO: 0 X10(3)/MCL (ref 0–0.2)
BASOPHILS NFR BLD AUTO: 0.2 %
BILIRUB SERPL-MCNC: 0.3 MG/DL (ref 0.2–1)
BILIRUBIN DIRECT+TOT PNL SERPL-MCNC: 0.1 MG/DL (ref 0–0.5)
BILIRUBIN DIRECT+TOT PNL SERPL-MCNC: 0.2 MG/DL (ref 0–0.8)
BUN SERPL-MCNC: 16 MG/DL (ref 7–18)
CALCIUM SERPL-MCNC: 9 MG/DL (ref 8.5–10.1)
CHLORIDE SERPL-SCNC: 94 MMOL/L (ref 98–107)
CO2 SERPL-SCNC: 25 MMOL/L (ref 21–32)
CREAT SERPL-MCNC: 1.1 MG/DL (ref 0.7–1.3)
EOSINOPHIL # BLD AUTO: 0 X10(3)/MCL (ref 0–0.9)
EOSINOPHIL NFR BLD AUTO: 0.3 %
ERYTHROCYTE [DISTWIDTH] IN BLOOD BY AUTOMATED COUNT: 14.4 % (ref 11.5–17)
GLOBULIN SER-MCNC: 3.7 GM/DL (ref 2.4–3.5)
GLUCOSE SERPL-MCNC: 80 MG/DL (ref 74–106)
HCT VFR BLD AUTO: 27.2 % (ref 42–52)
HGB BLD-MCNC: 9.2 GM/DL (ref 14–18)
LYMPHOCYTES # BLD AUTO: 1 X10(3)/MCL (ref 0.6–4.6)
LYMPHOCYTES NFR BLD AUTO: 7.4 %
MCH RBC QN AUTO: 31.2 PG (ref 27–31)
MCHC RBC AUTO-ENTMCNC: 33.8 GM/DL (ref 33–36)
MCV RBC AUTO: 92.2 FL (ref 80–94)
MONOCYTES # BLD AUTO: 1.3 X10(3)/MCL (ref 0.1–1.3)
MONOCYTES NFR BLD AUTO: 10.1 %
NEUTROPHILS # BLD AUTO: 10.6 X10(3)/MCL (ref 2.1–9.2)
NEUTROPHILS NFR BLD AUTO: 82 %
PLATELET # BLD AUTO: 318 X10(3)/MCL (ref 130–400)
PMV BLD AUTO: 8.1 FL (ref 9.4–12.4)
POTASSIUM SERPL-SCNC: 4.4 MMOL/L (ref 3.5–5.1)
PROT SERPL-MCNC: 7 GM/DL (ref 6.4–8.2)
PSA SERPL-MCNC: 1.2 NG/ML (ref 0–4)
RBC # BLD AUTO: 2.95 X10(6)/MCL (ref 4.7–6.1)
SODIUM SERPL-SCNC: 129 MMOL/L (ref 136–145)
WBC # SPEC AUTO: 12.9 X10(3)/MCL (ref 4.5–11.5)

## 2018-09-12 ENCOUNTER — HISTORICAL (OUTPATIENT)
Dept: INFUSION THERAPY | Facility: HOSPITAL | Age: 53
End: 2018-09-12

## 2018-09-12 LAB
ABS NEUT (OLG): 6.14 X10(3)/MCL (ref 2.1–9.2)
ALBUMIN SERPL-MCNC: 3.8 GM/DL (ref 3.4–5)
ALBUMIN/GLOB SERPL: 1 {RATIO}
ALP SERPL-CCNC: 87 UNIT/L (ref 50–136)
ALT SERPL-CCNC: 30 UNIT/L (ref 12–78)
AST SERPL-CCNC: 14 UNIT/L (ref 15–37)
BASOPHILS # BLD AUTO: 0 X10(3)/MCL (ref 0–0.2)
BASOPHILS NFR BLD AUTO: 0.3 %
BILIRUB SERPL-MCNC: 0.2 MG/DL (ref 0.2–1)
BILIRUBIN DIRECT+TOT PNL SERPL-MCNC: 0.1 MG/DL (ref 0–0.2)
BILIRUBIN DIRECT+TOT PNL SERPL-MCNC: 0.1 MG/DL (ref 0–0.8)
BUN SERPL-MCNC: 30 MG/DL (ref 7–18)
CALCIUM SERPL-MCNC: 8.5 MG/DL (ref 8.5–10.1)
CHLORIDE SERPL-SCNC: 92 MMOL/L (ref 98–107)
CO2 SERPL-SCNC: 25 MMOL/L (ref 21–32)
CREAT SERPL-MCNC: 1.68 MG/DL (ref 0.7–1.3)
EOSINOPHIL # BLD AUTO: 0.3 X10(3)/MCL (ref 0–0.9)
EOSINOPHIL NFR BLD AUTO: 2.9 %
ERYTHROCYTE [DISTWIDTH] IN BLOOD BY AUTOMATED COUNT: 14.5 % (ref 11.5–17)
GLOBULIN SER-MCNC: 3.8 GM/DL (ref 2.4–3.5)
GLUCOSE SERPL-MCNC: 77 MG/DL (ref 74–106)
HCT VFR BLD AUTO: 29.3 % (ref 42–52)
HGB BLD-MCNC: 10.2 GM/DL (ref 14–18)
LYMPHOCYTES # BLD AUTO: 1.9 X10(3)/MCL (ref 0.6–4.6)
LYMPHOCYTES NFR BLD AUTO: 20.3 %
MCH RBC QN AUTO: 31.6 PG (ref 27–31)
MCHC RBC AUTO-ENTMCNC: 34.8 GM/DL (ref 33–36)
MCV RBC AUTO: 90.7 FL (ref 80–94)
MONOCYTES # BLD AUTO: 1.1 X10(3)/MCL (ref 0.1–1.3)
MONOCYTES NFR BLD AUTO: 11.5 %
NEUTROPHILS # BLD AUTO: 6.1 X10(3)/MCL (ref 2.1–9.2)
NEUTROPHILS NFR BLD AUTO: 65 %
PLATELET # BLD AUTO: 277 X10(3)/MCL (ref 130–400)
PMV BLD AUTO: 8 FL (ref 9.4–12.4)
POTASSIUM SERPL-SCNC: 4.5 MMOL/L (ref 3.5–5.1)
PROT SERPL-MCNC: 7.6 GM/DL (ref 6.4–8.2)
RBC # BLD AUTO: 3.23 X10(6)/MCL (ref 4.7–6.1)
SODIUM SERPL-SCNC: 128 MMOL/L (ref 136–145)
WBC # SPEC AUTO: 9.4 X10(3)/MCL (ref 4.5–11.5)

## 2018-09-14 ENCOUNTER — HISTORICAL (OUTPATIENT)
Dept: INFUSION THERAPY | Facility: HOSPITAL | Age: 53
End: 2018-09-14

## 2018-10-02 ENCOUNTER — HISTORICAL (OUTPATIENT)
Dept: ADMINISTRATIVE | Facility: HOSPITAL | Age: 53
End: 2018-10-02

## 2018-10-02 LAB
ABS NEUT (OLG): 8.25 X10(3)/MCL (ref 2.1–9.2)
ALBUMIN SERPL-MCNC: 3.4 GM/DL (ref 3.4–5)
ALBUMIN/GLOB SERPL: 0.9 {RATIO}
ALP SERPL-CCNC: 70 UNIT/L (ref 50–136)
ALT SERPL-CCNC: 24 UNIT/L (ref 12–78)
AST SERPL-CCNC: 14 UNIT/L (ref 15–37)
BASOPHILS # BLD AUTO: 0 X10(3)/MCL (ref 0–0.2)
BASOPHILS NFR BLD AUTO: 0.3 %
BILIRUB SERPL-MCNC: 0.3 MG/DL (ref 0.2–1)
BILIRUBIN DIRECT+TOT PNL SERPL-MCNC: 0.1 MG/DL (ref 0–0.2)
BILIRUBIN DIRECT+TOT PNL SERPL-MCNC: 0.2 MG/DL (ref 0–0.8)
BUN SERPL-MCNC: 12 MG/DL (ref 7–18)
CALCIUM SERPL-MCNC: 8.7 MG/DL (ref 8.5–10.1)
CHLORIDE SERPL-SCNC: 102 MMOL/L (ref 98–107)
CO2 SERPL-SCNC: 26 MMOL/L (ref 21–32)
CREAT SERPL-MCNC: 1.02 MG/DL (ref 0.7–1.3)
EOSINOPHIL # BLD AUTO: 0.1 X10(3)/MCL (ref 0–0.9)
EOSINOPHIL NFR BLD AUTO: 1 %
ERYTHROCYTE [DISTWIDTH] IN BLOOD BY AUTOMATED COUNT: 15.1 % (ref 11.5–17)
GLOBULIN SER-MCNC: 3.6 GM/DL (ref 2.4–3.5)
GLUCOSE SERPL-MCNC: 90 MG/DL (ref 74–106)
HCT VFR BLD AUTO: 27.9 % (ref 42–52)
HGB BLD-MCNC: 9.2 GM/DL (ref 14–18)
LYMPHOCYTES # BLD AUTO: 0.7 X10(3)/MCL (ref 0.6–4.6)
LYMPHOCYTES NFR BLD AUTO: 7 %
MCH RBC QN AUTO: 31.3 PG (ref 27–31)
MCHC RBC AUTO-ENTMCNC: 33 GM/DL (ref 33–36)
MCV RBC AUTO: 94.9 FL (ref 80–94)
MONOCYTES # BLD AUTO: 1.3 X10(3)/MCL (ref 0.1–1.3)
MONOCYTES NFR BLD AUTO: 12.5 %
NEUTROPHILS # BLD AUTO: 8.2 X10(3)/MCL (ref 2.1–9.2)
NEUTROPHILS NFR BLD AUTO: 79.2 %
PLATELET # BLD AUTO: 254 X10(3)/MCL (ref 130–400)
PMV BLD AUTO: 8.1 FL (ref 9.4–12.4)
POTASSIUM SERPL-SCNC: 4.8 MMOL/L (ref 3.5–5.1)
PROT SERPL-MCNC: 7 GM/DL (ref 6.4–8.2)
PSA SERPL-MCNC: 1.34 NG/ML (ref 0–4)
RBC # BLD AUTO: 2.94 X10(6)/MCL (ref 4.7–6.1)
SODIUM SERPL-SCNC: 139 MMOL/L (ref 136–145)
WBC # SPEC AUTO: 10.4 X10(3)/MCL (ref 4.5–11.5)

## 2018-10-03 ENCOUNTER — HISTORICAL (OUTPATIENT)
Dept: INFUSION THERAPY | Facility: HOSPITAL | Age: 53
End: 2018-10-03

## 2018-10-05 ENCOUNTER — HISTORICAL (OUTPATIENT)
Dept: INFUSION THERAPY | Facility: HOSPITAL | Age: 53
End: 2018-10-05

## 2018-10-23 ENCOUNTER — HISTORICAL (OUTPATIENT)
Dept: ADMINISTRATIVE | Facility: HOSPITAL | Age: 53
End: 2018-10-23

## 2018-10-23 LAB
ABS NEUT (OLG): 6.38 X10(3)/MCL (ref 2.1–9.2)
ALBUMIN SERPL-MCNC: 3.2 GM/DL (ref 3.4–5)
ALBUMIN/GLOB SERPL: 0.8 {RATIO}
ALP SERPL-CCNC: 66 UNIT/L (ref 50–136)
ALT SERPL-CCNC: 24 UNIT/L (ref 12–78)
AST SERPL-CCNC: 12 UNIT/L (ref 15–37)
BASOPHILS # BLD AUTO: 0 X10(3)/MCL (ref 0–0.2)
BASOPHILS NFR BLD AUTO: 0.5 %
BILIRUB SERPL-MCNC: 0.2 MG/DL (ref 0.2–1)
BILIRUBIN DIRECT+TOT PNL SERPL-MCNC: 0.1 MG/DL (ref 0–0.2)
BILIRUBIN DIRECT+TOT PNL SERPL-MCNC: 0.1 MG/DL (ref 0–0.8)
BUN SERPL-MCNC: 12 MG/DL (ref 7–18)
CALCIUM SERPL-MCNC: 8.9 MG/DL (ref 8.5–10.1)
CHLORIDE SERPL-SCNC: 102 MMOL/L (ref 98–107)
CO2 SERPL-SCNC: 25 MMOL/L (ref 21–32)
CREAT SERPL-MCNC: 1.31 MG/DL (ref 0.7–1.3)
EOSINOPHIL # BLD AUTO: 0.1 X10(3)/MCL (ref 0–0.9)
EOSINOPHIL NFR BLD AUTO: 0.9 %
ERYTHROCYTE [DISTWIDTH] IN BLOOD BY AUTOMATED COUNT: 15.4 % (ref 11.5–17)
GLOBULIN SER-MCNC: 3.9 GM/DL (ref 2.4–3.5)
GLUCOSE SERPL-MCNC: 84 MG/DL (ref 74–106)
HCT VFR BLD AUTO: 26.8 % (ref 42–52)
HGB BLD-MCNC: 8.9 GM/DL (ref 14–18)
LYMPHOCYTES # BLD AUTO: 1.1 X10(3)/MCL (ref 0.6–4.6)
LYMPHOCYTES NFR BLD AUTO: 12.1 %
MCH RBC QN AUTO: 31.2 PG (ref 27–31)
MCHC RBC AUTO-ENTMCNC: 33.2 GM/DL (ref 33–36)
MCV RBC AUTO: 94 FL (ref 80–94)
MONOCYTES # BLD AUTO: 1.3 X10(3)/MCL (ref 0.1–1.3)
MONOCYTES NFR BLD AUTO: 14.7 %
NEUTROPHILS # BLD AUTO: 6.4 X10(3)/MCL (ref 2.1–9.2)
NEUTROPHILS NFR BLD AUTO: 71.8 %
PLATELET # BLD AUTO: 306 X10(3)/MCL (ref 130–400)
PMV BLD AUTO: 8.2 FL (ref 9.4–12.4)
POTASSIUM SERPL-SCNC: 4.8 MMOL/L (ref 3.5–5.1)
PROT SERPL-MCNC: 7.1 GM/DL (ref 6.4–8.2)
PSA SERPL-MCNC: 1.33 NG/ML (ref 0–4)
RBC # BLD AUTO: 2.85 X10(6)/MCL (ref 4.7–6.1)
SODIUM SERPL-SCNC: 136 MMOL/L (ref 136–145)
WBC # SPEC AUTO: 8.9 X10(3)/MCL (ref 4.5–11.5)

## 2018-10-24 ENCOUNTER — HISTORICAL (OUTPATIENT)
Dept: INFUSION THERAPY | Facility: HOSPITAL | Age: 53
End: 2018-10-24

## 2018-11-13 ENCOUNTER — HISTORICAL (OUTPATIENT)
Dept: ADMINISTRATIVE | Facility: HOSPITAL | Age: 53
End: 2018-11-13

## 2018-11-13 LAB
ABS NEUT (OLG): 6.19 X10(3)/MCL (ref 2.1–9.2)
ALBUMIN SERPL-MCNC: 3.4 GM/DL (ref 3.4–5)
ALBUMIN/GLOB SERPL: 0.9 RATIO (ref 1.1–2)
ALP SERPL-CCNC: 61 UNIT/L (ref 50–136)
ALT SERPL-CCNC: 21 UNIT/L (ref 12–78)
AST SERPL-CCNC: 8 UNIT/L (ref 15–37)
BASOPHILS # BLD AUTO: 0 X10(3)/MCL (ref 0–0.2)
BASOPHILS NFR BLD AUTO: 0.4 %
BILIRUB SERPL-MCNC: 0.2 MG/DL (ref 0.2–1)
BILIRUBIN DIRECT+TOT PNL SERPL-MCNC: 0.1 MG/DL (ref 0–0.5)
BILIRUBIN DIRECT+TOT PNL SERPL-MCNC: 0.1 MG/DL (ref 0–0.8)
BUN SERPL-MCNC: 14 MG/DL (ref 7–18)
CALCIUM SERPL-MCNC: 8.5 MG/DL (ref 8.5–10.1)
CHLORIDE SERPL-SCNC: 101 MMOL/L (ref 98–107)
CO2 SERPL-SCNC: 27 MMOL/L (ref 21–32)
CREAT SERPL-MCNC: 0.97 MG/DL (ref 0.7–1.3)
EOSINOPHIL # BLD AUTO: 0 X10(3)/MCL (ref 0–0.9)
EOSINOPHIL NFR BLD AUTO: 0.5 %
ERYTHROCYTE [DISTWIDTH] IN BLOOD BY AUTOMATED COUNT: 15.9 % (ref 11.5–17)
GLOBULIN SER-MCNC: 3.8 GM/DL (ref 2.4–3.5)
GLUCOSE SERPL-MCNC: 132 MG/DL (ref 74–106)
HCT VFR BLD AUTO: 28.8 % (ref 42–52)
HGB BLD-MCNC: 9.4 GM/DL (ref 14–18)
LYMPHOCYTES # BLD AUTO: 0.9 X10(3)/MCL (ref 0.6–4.6)
LYMPHOCYTES NFR BLD AUTO: 11.5 %
MCH RBC QN AUTO: 31 PG (ref 27–31)
MCHC RBC AUTO-ENTMCNC: 32.6 GM/DL (ref 33–36)
MCV RBC AUTO: 95 FL (ref 80–94)
MONOCYTES # BLD AUTO: 0.8 X10(3)/MCL (ref 0.1–1.3)
MONOCYTES NFR BLD AUTO: 10.1 %
NEUTROPHILS # BLD AUTO: 6.2 X10(3)/MCL (ref 2.1–9.2)
NEUTROPHILS NFR BLD AUTO: 77 %
PLATELET # BLD AUTO: 267 X10(3)/MCL (ref 130–400)
PMV BLD AUTO: 8 FL (ref 9.4–12.4)
POTASSIUM SERPL-SCNC: 4.7 MMOL/L (ref 3.5–5.1)
PROT SERPL-MCNC: 7.2 GM/DL (ref 6.4–8.2)
RBC # BLD AUTO: 3.03 X10(6)/MCL (ref 4.7–6.1)
SODIUM SERPL-SCNC: 134 MMOL/L (ref 136–145)
WBC # SPEC AUTO: 8 X10(3)/MCL (ref 4.5–11.5)

## 2018-11-27 ENCOUNTER — HISTORICAL (OUTPATIENT)
Dept: ADMINISTRATIVE | Facility: HOSPITAL | Age: 53
End: 2018-11-27

## 2018-11-27 LAB
ABS NEUT (OLG): 5.01 X10(3)/MCL (ref 2.1–9.2)
ALBUMIN SERPL-MCNC: 3.5 GM/DL (ref 3.4–5)
ALBUMIN/GLOB SERPL: 0.9 {RATIO}
ALP SERPL-CCNC: 66 UNIT/L (ref 50–136)
ALT SERPL-CCNC: 24 UNIT/L (ref 12–78)
AST SERPL-CCNC: 15 UNIT/L (ref 15–37)
BASOPHILS # BLD AUTO: 0 X10(3)/MCL (ref 0–0.2)
BASOPHILS NFR BLD AUTO: 0.6 %
BILIRUB SERPL-MCNC: 0.2 MG/DL (ref 0.2–1)
BILIRUBIN DIRECT+TOT PNL SERPL-MCNC: 0.1 MG/DL (ref 0–0.2)
BILIRUBIN DIRECT+TOT PNL SERPL-MCNC: 0.1 MG/DL (ref 0–0.8)
BUN SERPL-MCNC: 15 MG/DL (ref 7–18)
CALCIUM SERPL-MCNC: 8.7 MG/DL (ref 8.5–10.1)
CHLORIDE SERPL-SCNC: 104 MMOL/L (ref 98–107)
CO2 SERPL-SCNC: 24 MMOL/L (ref 21–32)
CREAT SERPL-MCNC: 1.11 MG/DL (ref 0.7–1.3)
EOSINOPHIL # BLD AUTO: 0.4 X10(3)/MCL (ref 0–0.9)
EOSINOPHIL NFR BLD AUTO: 5.6 %
ERYTHROCYTE [DISTWIDTH] IN BLOOD BY AUTOMATED COUNT: 14.8 % (ref 11.5–17)
GLOBULIN SER-MCNC: 3.8 GM/DL (ref 2.4–3.5)
GLUCOSE SERPL-MCNC: 85 MG/DL (ref 74–106)
HCT VFR BLD AUTO: 34.3 % (ref 42–52)
HGB BLD-MCNC: 10.8 GM/DL (ref 14–18)
LYMPHOCYTES # BLD AUTO: 0.9 X10(3)/MCL (ref 0.6–4.6)
LYMPHOCYTES NFR BLD AUTO: 13 %
MCH RBC QN AUTO: 30.9 PG (ref 27–31)
MCHC RBC AUTO-ENTMCNC: 31.5 GM/DL (ref 33–36)
MCV RBC AUTO: 98.3 FL (ref 80–94)
MONOCYTES # BLD AUTO: 0.6 X10(3)/MCL (ref 0.1–1.3)
MONOCYTES NFR BLD AUTO: 9.3 %
NEUTROPHILS # BLD AUTO: 5 X10(3)/MCL (ref 2.1–9.2)
NEUTROPHILS NFR BLD AUTO: 71.2 %
PLATELET # BLD AUTO: 311 X10(3)/MCL (ref 130–400)
PMV BLD AUTO: 8.3 FL (ref 9.4–12.4)
POTASSIUM SERPL-SCNC: 4.2 MMOL/L (ref 3.5–5.1)
PROT SERPL-MCNC: 7.3 GM/DL (ref 6.4–8.2)
RBC # BLD AUTO: 3.49 X10(6)/MCL (ref 4.7–6.1)
SODIUM SERPL-SCNC: 137 MMOL/L (ref 136–145)
TESTOST SERPL-MCNC: 13 NG/DL (ref 241–827)
WBC # SPEC AUTO: 7 X10(3)/MCL (ref 4.5–11.5)

## 2018-11-28 ENCOUNTER — HISTORICAL (OUTPATIENT)
Dept: INFUSION THERAPY | Facility: HOSPITAL | Age: 53
End: 2018-11-28

## 2018-12-18 ENCOUNTER — HISTORICAL (OUTPATIENT)
Dept: ADMINISTRATIVE | Facility: HOSPITAL | Age: 53
End: 2018-12-18

## 2018-12-18 LAB
ABS NEUT (OLG): 8.07 X10(3)/MCL (ref 2.1–9.2)
ALBUMIN SERPL-MCNC: 3.5 GM/DL (ref 3.4–5)
ALBUMIN/GLOB SERPL: 0.9 RATIO (ref 1.1–2)
ALP SERPL-CCNC: 64 UNIT/L (ref 50–136)
ALT SERPL-CCNC: 22 UNIT/L (ref 12–78)
AST SERPL-CCNC: 13 UNIT/L (ref 15–37)
BASOPHILS # BLD AUTO: 0.1 X10(3)/MCL (ref 0–0.2)
BASOPHILS NFR BLD AUTO: 0.6 %
BILIRUB SERPL-MCNC: 0.2 MG/DL (ref 0.2–1)
BILIRUBIN DIRECT+TOT PNL SERPL-MCNC: 0.1 MG/DL (ref 0–0.5)
BILIRUBIN DIRECT+TOT PNL SERPL-MCNC: 0.1 MG/DL (ref 0–0.8)
BUN SERPL-MCNC: 13 MG/DL (ref 7–18)
CALCIUM SERPL-MCNC: 8.5 MG/DL (ref 8.5–10.1)
CHLORIDE SERPL-SCNC: 96 MMOL/L (ref 98–107)
CO2 SERPL-SCNC: 24 MMOL/L (ref 21–32)
CREAT SERPL-MCNC: 1.15 MG/DL (ref 0.7–1.3)
EOSINOPHIL # BLD AUTO: 0.1 X10(3)/MCL (ref 0–0.9)
EOSINOPHIL NFR BLD AUTO: 0.9 %
ERYTHROCYTE [DISTWIDTH] IN BLOOD BY AUTOMATED COUNT: 13 % (ref 11.5–17)
GLOBULIN SER-MCNC: 4.1 GM/DL (ref 2.4–3.5)
GLUCOSE SERPL-MCNC: 129 MG/DL (ref 74–106)
HCT VFR BLD AUTO: 35.4 % (ref 42–52)
HGB BLD-MCNC: 11.4 GM/DL (ref 14–18)
LYMPHOCYTES # BLD AUTO: 1.3 X10(3)/MCL (ref 0.6–4.6)
LYMPHOCYTES NFR BLD AUTO: 12.5 %
MCH RBC QN AUTO: 30.1 PG (ref 27–31)
MCHC RBC AUTO-ENTMCNC: 32.2 GM/DL (ref 33–36)
MCV RBC AUTO: 93.4 FL (ref 80–94)
MONOCYTES # BLD AUTO: 1 X10(3)/MCL (ref 0.1–1.3)
MONOCYTES NFR BLD AUTO: 9.1 %
NEUTROPHILS # BLD AUTO: 8.1 X10(3)/MCL (ref 2.1–9.2)
NEUTROPHILS NFR BLD AUTO: 76.4 %
PLATELET # BLD AUTO: 299 X10(3)/MCL (ref 130–400)
PMV BLD AUTO: 8.2 FL (ref 9.4–12.4)
POTASSIUM SERPL-SCNC: 4.4 MMOL/L (ref 3.5–5.1)
PROT SERPL-MCNC: 7.6 GM/DL (ref 6.4–8.2)
RBC # BLD AUTO: 3.79 X10(6)/MCL (ref 4.7–6.1)
SODIUM SERPL-SCNC: 131 MMOL/L (ref 136–145)
WBC # SPEC AUTO: 10.6 X10(3)/MCL (ref 4.5–11.5)

## 2018-12-19 ENCOUNTER — HISTORICAL (OUTPATIENT)
Dept: INFUSION THERAPY | Facility: HOSPITAL | Age: 53
End: 2018-12-19

## 2019-01-08 ENCOUNTER — HISTORICAL (OUTPATIENT)
Dept: ADMINISTRATIVE | Facility: HOSPITAL | Age: 54
End: 2019-01-08

## 2019-01-08 LAB
ABS NEUT (OLG): 11.01 X10(3)/MCL (ref 2.1–9.2)
ALBUMIN SERPL-MCNC: 3.7 GM/DL (ref 3.4–5)
ALBUMIN/GLOB SERPL: 1 {RATIO}
ALP SERPL-CCNC: 54 UNIT/L (ref 50–136)
ALT SERPL-CCNC: 28 UNIT/L (ref 12–78)
AST SERPL-CCNC: 16 UNIT/L (ref 15–37)
BASOPHILS # BLD AUTO: 0 X10(3)/MCL (ref 0–0.2)
BASOPHILS NFR BLD AUTO: 0.3 %
BILIRUB SERPL-MCNC: 0.4 MG/DL (ref 0.2–1)
BILIRUBIN DIRECT+TOT PNL SERPL-MCNC: 0.1 MG/DL (ref 0–0.2)
BILIRUBIN DIRECT+TOT PNL SERPL-MCNC: 0.3 MG/DL (ref 0–0.8)
BUN SERPL-MCNC: 15 MG/DL (ref 7–18)
CALCIUM SERPL-MCNC: 8.8 MG/DL (ref 8.5–10.1)
CHLORIDE SERPL-SCNC: 97 MMOL/L (ref 98–107)
CO2 SERPL-SCNC: 27 MMOL/L (ref 21–32)
CREAT SERPL-MCNC: 1.2 MG/DL (ref 0.7–1.3)
EOSINOPHIL # BLD AUTO: 0 X10(3)/MCL (ref 0–0.9)
EOSINOPHIL NFR BLD AUTO: 0.2 %
ERYTHROCYTE [DISTWIDTH] IN BLOOD BY AUTOMATED COUNT: 13.5 % (ref 11.5–17)
GLOBULIN SER-MCNC: 3.7 GM/DL (ref 2.4–3.5)
GLUCOSE SERPL-MCNC: 105 MG/DL (ref 74–106)
HCT VFR BLD AUTO: 34.1 % (ref 42–52)
HGB BLD-MCNC: 11.1 GM/DL (ref 14–18)
LYMPHOCYTES # BLD AUTO: 0.8 X10(3)/MCL (ref 0.6–4.6)
LYMPHOCYTES NFR BLD AUTO: 6.6 %
MCH RBC QN AUTO: 30.3 PG (ref 27–31)
MCHC RBC AUTO-ENTMCNC: 32.6 GM/DL (ref 33–36)
MCV RBC AUTO: 93.2 FL (ref 80–94)
MONOCYTES # BLD AUTO: 0.6 X10(3)/MCL (ref 0.1–1.3)
MONOCYTES NFR BLD AUTO: 4.9 %
NEUTROPHILS # BLD AUTO: 11 X10(3)/MCL (ref 2.1–9.2)
NEUTROPHILS NFR BLD AUTO: 87.6 %
PLATELET # BLD AUTO: 222 X10(3)/MCL (ref 130–400)
PMV BLD AUTO: 8.3 FL (ref 9.4–12.4)
POTASSIUM SERPL-SCNC: 5.1 MMOL/L (ref 3.5–5.1)
PROT SERPL-MCNC: 7.4 GM/DL (ref 6.4–8.2)
PSA SERPL-MCNC: 1.68 NG/ML (ref 0–4)
RBC # BLD AUTO: 3.66 X10(6)/MCL (ref 4.7–6.1)
SODIUM SERPL-SCNC: 133 MMOL/L (ref 136–145)
WBC # SPEC AUTO: 12.6 X10(3)/MCL (ref 4.5–11.5)

## 2019-01-09 ENCOUNTER — HISTORICAL (OUTPATIENT)
Dept: INFUSION THERAPY | Facility: HOSPITAL | Age: 54
End: 2019-01-09

## 2019-01-29 ENCOUNTER — HISTORICAL (OUTPATIENT)
Dept: ADMINISTRATIVE | Facility: HOSPITAL | Age: 54
End: 2019-01-29

## 2019-01-29 LAB
ABS NEUT (OLG): 11.23 X10(3)/MCL (ref 2.1–9.2)
ALBUMIN SERPL-MCNC: 3 GM/DL (ref 3.4–5)
ALBUMIN/GLOB SERPL: 0.7 {RATIO}
ALP SERPL-CCNC: 74 UNIT/L (ref 50–136)
ALT SERPL-CCNC: 58 UNIT/L (ref 12–78)
AST SERPL-CCNC: 40 UNIT/L (ref 15–37)
BASOPHILS # BLD AUTO: 0 X10(3)/MCL (ref 0–0.2)
BASOPHILS NFR BLD AUTO: 0.1 %
BILIRUB SERPL-MCNC: 0.3 MG/DL (ref 0.2–1)
BILIRUBIN DIRECT+TOT PNL SERPL-MCNC: 0.1 MG/DL (ref 0–0.2)
BILIRUBIN DIRECT+TOT PNL SERPL-MCNC: 0.2 MG/DL (ref 0–0.8)
BUN SERPL-MCNC: 18 MG/DL (ref 7–18)
CALCIUM SERPL-MCNC: 8.4 MG/DL (ref 8.5–10.1)
CHLORIDE SERPL-SCNC: 97 MMOL/L (ref 98–107)
CO2 SERPL-SCNC: 20 MMOL/L (ref 21–32)
CREAT SERPL-MCNC: 1.04 MG/DL (ref 0.7–1.3)
EOSINOPHIL # BLD AUTO: 0 X10(3)/MCL (ref 0–0.9)
EOSINOPHIL NFR BLD AUTO: 0.1 %
ERYTHROCYTE [DISTWIDTH] IN BLOOD BY AUTOMATED COUNT: 13.7 % (ref 11.5–17)
GLOBULIN SER-MCNC: 4.6 GM/DL (ref 2.4–3.5)
GLUCOSE SERPL-MCNC: 86 MG/DL (ref 74–106)
HCT VFR BLD AUTO: 32.9 % (ref 42–52)
HGB BLD-MCNC: 10.8 GM/DL (ref 14–18)
LYMPHOCYTES # BLD AUTO: 1.2 X10(3)/MCL (ref 0.6–4.6)
LYMPHOCYTES NFR BLD AUTO: 8.4 %
MCH RBC QN AUTO: 29.2 PG (ref 27–31)
MCHC RBC AUTO-ENTMCNC: 32.8 GM/DL (ref 33–36)
MCV RBC AUTO: 88.9 FL (ref 80–94)
MONOCYTES # BLD AUTO: 1.2 X10(3)/MCL (ref 0.1–1.3)
MONOCYTES NFR BLD AUTO: 8.5 %
NEUTROPHILS # BLD AUTO: 11.2 X10(3)/MCL (ref 2.1–9.2)
NEUTROPHILS NFR BLD AUTO: 82.3 %
PLATELET # BLD AUTO: 237 X10(3)/MCL (ref 130–400)
PMV BLD AUTO: 8.2 FL (ref 9.4–12.4)
POTASSIUM SERPL-SCNC: 3.9 MMOL/L (ref 3.5–5.1)
PROT SERPL-MCNC: 7.6 GM/DL (ref 6.4–8.2)
PSA SERPL-MCNC: 1.75 NG/ML (ref 0–4)
RBC # BLD AUTO: 3.7 X10(6)/MCL (ref 4.7–6.1)
SODIUM SERPL-SCNC: 130 MMOL/L (ref 136–145)
WBC # SPEC AUTO: 13.7 X10(3)/MCL (ref 4.5–11.5)

## 2019-02-06 ENCOUNTER — HISTORICAL (OUTPATIENT)
Dept: INFUSION THERAPY | Facility: HOSPITAL | Age: 54
End: 2019-02-06

## 2019-02-06 LAB
ABS NEUT (OLG): 5.34 X10(3)/MCL (ref 2.1–9.2)
ALBUMIN SERPL-MCNC: 3.5 GM/DL (ref 3.4–5)
ALBUMIN/GLOB SERPL: 0.8 {RATIO}
ALP SERPL-CCNC: 55 UNIT/L (ref 50–136)
ALT SERPL-CCNC: 39 UNIT/L (ref 12–78)
AST SERPL-CCNC: 14 UNIT/L (ref 15–37)
BASOPHILS # BLD AUTO: 0 X10(3)/MCL (ref 0–0.2)
BASOPHILS NFR BLD AUTO: 0.5 %
BILIRUB SERPL-MCNC: 0.3 MG/DL (ref 0.2–1)
BILIRUBIN DIRECT+TOT PNL SERPL-MCNC: 0.1 MG/DL (ref 0–0.2)
BILIRUBIN DIRECT+TOT PNL SERPL-MCNC: 0.2 MG/DL (ref 0–0.8)
BUN SERPL-MCNC: 20 MG/DL (ref 7–18)
CALCIUM SERPL-MCNC: 9.3 MG/DL (ref 8.5–10.1)
CHLORIDE SERPL-SCNC: 95 MMOL/L (ref 98–107)
CO2 SERPL-SCNC: 26 MMOL/L (ref 21–32)
CREAT SERPL-MCNC: 1.15 MG/DL (ref 0.7–1.3)
EOSINOPHIL # BLD AUTO: 0.1 X10(3)/MCL (ref 0–0.9)
EOSINOPHIL NFR BLD AUTO: 1.5 %
ERYTHROCYTE [DISTWIDTH] IN BLOOD BY AUTOMATED COUNT: 14.2 % (ref 11.5–17)
GLOBULIN SER-MCNC: 4.3 GM/DL (ref 2.4–3.5)
GLUCOSE SERPL-MCNC: 102 MG/DL (ref 74–106)
HCT VFR BLD AUTO: 33.8 % (ref 42–52)
HGB BLD-MCNC: 11.1 GM/DL (ref 14–18)
LYMPHOCYTES # BLD AUTO: 1.6 X10(3)/MCL (ref 0.6–4.6)
LYMPHOCYTES NFR BLD AUTO: 20.2 %
MCH RBC QN AUTO: 29.2 PG (ref 27–31)
MCHC RBC AUTO-ENTMCNC: 32.8 GM/DL (ref 33–36)
MCV RBC AUTO: 88.9 FL (ref 80–94)
MONOCYTES # BLD AUTO: 0.7 X10(3)/MCL (ref 0.1–1.3)
MONOCYTES NFR BLD AUTO: 9.2 %
NEUTROPHILS # BLD AUTO: 5.3 X10(3)/MCL (ref 2.1–9.2)
NEUTROPHILS NFR BLD AUTO: 67 %
PLATELET # BLD AUTO: 274 X10(3)/MCL (ref 130–400)
PMV BLD AUTO: 8.2 FL (ref 9.4–12.4)
POTASSIUM SERPL-SCNC: 4.4 MMOL/L (ref 3.5–5.1)
PROT SERPL-MCNC: 7.8 GM/DL (ref 6.4–8.2)
RBC # BLD AUTO: 3.8 X10(6)/MCL (ref 4.7–6.1)
SODIUM SERPL-SCNC: 130 MMOL/L (ref 136–145)
WBC # SPEC AUTO: 8 X10(3)/MCL (ref 4.5–11.5)

## 2019-02-26 ENCOUNTER — HISTORICAL (OUTPATIENT)
Dept: ADMINISTRATIVE | Facility: HOSPITAL | Age: 54
End: 2019-02-26

## 2019-02-26 LAB
ABS NEUT (OLG): 11.48 X10(3)/MCL (ref 2.1–9.2)
ALBUMIN SERPL-MCNC: 3.8 GM/DL (ref 3.4–5)
ALBUMIN/GLOB SERPL: 1 {RATIO}
ALP SERPL-CCNC: 55 UNIT/L (ref 50–136)
ALT SERPL-CCNC: 27 UNIT/L (ref 12–78)
AST SERPL-CCNC: 14 UNIT/L (ref 15–37)
BASOPHILS # BLD AUTO: 0 X10(3)/MCL (ref 0–0.2)
BASOPHILS NFR BLD AUTO: 0.1 %
BILIRUB SERPL-MCNC: 0.3 MG/DL (ref 0.2–1)
BILIRUBIN DIRECT+TOT PNL SERPL-MCNC: 0.1 MG/DL (ref 0–0.2)
BILIRUBIN DIRECT+TOT PNL SERPL-MCNC: 0.2 MG/DL (ref 0–0.8)
BUN SERPL-MCNC: 16 MG/DL (ref 7–18)
CALCIUM SERPL-MCNC: 8.9 MG/DL (ref 8.5–10.1)
CHLORIDE SERPL-SCNC: 92 MMOL/L (ref 98–107)
CO2 SERPL-SCNC: 29 MMOL/L (ref 21–32)
CREAT SERPL-MCNC: 1.02 MG/DL (ref 0.7–1.3)
EOSINOPHIL # BLD AUTO: 0 X10(3)/MCL (ref 0–0.9)
EOSINOPHIL NFR BLD AUTO: 0.2 %
ERYTHROCYTE [DISTWIDTH] IN BLOOD BY AUTOMATED COUNT: 15.3 % (ref 11.5–17)
GLOBULIN SER-MCNC: 3.8 GM/DL (ref 2.4–3.5)
GLUCOSE SERPL-MCNC: 85 MG/DL (ref 74–106)
HCT VFR BLD AUTO: 35.4 % (ref 42–52)
HGB BLD-MCNC: 11.5 GM/DL (ref 14–18)
LYMPHOCYTES # BLD AUTO: 2.1 X10(3)/MCL (ref 0.6–4.6)
LYMPHOCYTES NFR BLD AUTO: 14.5 %
MCH RBC QN AUTO: 28.6 PG (ref 27–31)
MCHC RBC AUTO-ENTMCNC: 32.5 GM/DL (ref 33–36)
MCV RBC AUTO: 88.1 FL (ref 80–94)
MONOCYTES # BLD AUTO: 0.9 X10(3)/MCL (ref 0.1–1.3)
MONOCYTES NFR BLD AUTO: 6.3 %
NEUTROPHILS # BLD AUTO: 11.5 X10(3)/MCL (ref 2.1–9.2)
NEUTROPHILS NFR BLD AUTO: 77.9 %
PLATELET # BLD AUTO: 193 X10(3)/MCL (ref 130–400)
PMV BLD AUTO: 8.3 FL (ref 9.4–12.4)
POTASSIUM SERPL-SCNC: 3.8 MMOL/L (ref 3.5–5.1)
PROT SERPL-MCNC: 7.6 GM/DL (ref 6.4–8.2)
RBC # BLD AUTO: 4.02 X10(6)/MCL (ref 4.7–6.1)
SODIUM SERPL-SCNC: 129 MMOL/L (ref 136–145)
WBC # SPEC AUTO: 14.8 X10(3)/MCL (ref 4.5–11.5)

## 2019-02-27 ENCOUNTER — HISTORICAL (OUTPATIENT)
Dept: INFUSION THERAPY | Facility: HOSPITAL | Age: 54
End: 2019-02-27

## 2019-03-19 ENCOUNTER — HISTORICAL (OUTPATIENT)
Dept: ADMINISTRATIVE | Facility: HOSPITAL | Age: 54
End: 2019-03-19

## 2019-03-19 LAB
ABS NEUT (OLG): 5.68 X10(3)/MCL (ref 2.1–9.2)
ALBUMIN SERPL-MCNC: 3.6 GM/DL (ref 3.4–5)
ALBUMIN/GLOB SERPL: 1 {RATIO}
ALP SERPL-CCNC: 49 UNIT/L (ref 50–136)
ALT SERPL-CCNC: 24 UNIT/L (ref 12–78)
AST SERPL-CCNC: 16 UNIT/L (ref 15–37)
BASOPHILS # BLD AUTO: 0 X10(3)/MCL (ref 0–0.2)
BASOPHILS NFR BLD AUTO: 0.6 %
BILIRUB SERPL-MCNC: 0.5 MG/DL (ref 0.2–1)
BILIRUBIN DIRECT+TOT PNL SERPL-MCNC: 0.1 MG/DL (ref 0–0.2)
BILIRUBIN DIRECT+TOT PNL SERPL-MCNC: 0.4 MG/DL (ref 0–0.8)
BUN SERPL-MCNC: 13 MG/DL (ref 7–18)
CALCIUM SERPL-MCNC: 8.5 MG/DL (ref 8.5–10.1)
CHLORIDE SERPL-SCNC: 89 MMOL/L (ref 98–107)
CO2 SERPL-SCNC: 30 MMOL/L (ref 21–32)
CREAT SERPL-MCNC: 0.87 MG/DL (ref 0.7–1.3)
EOSINOPHIL # BLD AUTO: 0.1 X10(3)/MCL (ref 0–0.9)
EOSINOPHIL NFR BLD AUTO: 0.7 %
ERYTHROCYTE [DISTWIDTH] IN BLOOD BY AUTOMATED COUNT: 15.6 % (ref 11.5–17)
GLOBULIN SER-MCNC: 3.6 GM/DL (ref 2.4–3.5)
GLUCOSE SERPL-MCNC: 62 MG/DL (ref 74–106)
HCT VFR BLD AUTO: 33.2 % (ref 42–52)
HGB BLD-MCNC: 11.1 GM/DL (ref 14–18)
LYMPHOCYTES # BLD AUTO: 1.6 X10(3)/MCL (ref 0.6–4.6)
LYMPHOCYTES NFR BLD AUTO: 18.9 %
MCH RBC QN AUTO: 29 PG (ref 27–31)
MCHC RBC AUTO-ENTMCNC: 33.4 GM/DL (ref 33–36)
MCV RBC AUTO: 86.7 FL (ref 80–94)
MONOCYTES # BLD AUTO: 0.9 X10(3)/MCL (ref 0.1–1.3)
MONOCYTES NFR BLD AUTO: 10.3 %
NEUTROPHILS # BLD AUTO: 5.7 X10(3)/MCL (ref 2.1–9.2)
NEUTROPHILS NFR BLD AUTO: 68.1 %
PLATELET # BLD AUTO: 232 X10(3)/MCL (ref 130–400)
PMV BLD AUTO: 8.2 FL (ref 9.4–12.4)
POTASSIUM SERPL-SCNC: 3.8 MMOL/L (ref 3.5–5.1)
PROT SERPL-MCNC: 7.2 GM/DL (ref 6.4–8.2)
PSA SERPL-MCNC: 2.02 NG/ML (ref 0–4)
RBC # BLD AUTO: 3.83 X10(6)/MCL (ref 4.7–6.1)
SODIUM SERPL-SCNC: 126 MMOL/L (ref 136–145)
WBC # SPEC AUTO: 8.4 X10(3)/MCL (ref 4.5–11.5)

## 2019-03-20 ENCOUNTER — HISTORICAL (OUTPATIENT)
Dept: INFUSION THERAPY | Facility: HOSPITAL | Age: 54
End: 2019-03-20

## 2019-04-03 ENCOUNTER — HISTORICAL (OUTPATIENT)
Dept: RADIOLOGY | Facility: HOSPITAL | Age: 54
End: 2019-04-03

## 2019-04-09 ENCOUNTER — HISTORICAL (OUTPATIENT)
Dept: ADMINISTRATIVE | Facility: HOSPITAL | Age: 54
End: 2019-04-09

## 2019-04-09 LAB
ABS NEUT (OLG): 5.48 X10(3)/MCL (ref 2.1–9.2)
ALBUMIN SERPL-MCNC: 3.6 GM/DL (ref 3.4–5)
ALBUMIN/GLOB SERPL: 1 {RATIO}
ALP SERPL-CCNC: 48 UNIT/L (ref 50–136)
ALT SERPL-CCNC: 20 UNIT/L (ref 12–78)
AST SERPL-CCNC: 12 UNIT/L (ref 15–37)
BASOPHILS # BLD AUTO: 0 X10(3)/MCL (ref 0–0.2)
BASOPHILS NFR BLD AUTO: 0.6 %
BILIRUB SERPL-MCNC: 0.4 MG/DL (ref 0.2–1)
BILIRUBIN DIRECT+TOT PNL SERPL-MCNC: 0 MG/DL (ref 0–0.2)
BILIRUBIN DIRECT+TOT PNL SERPL-MCNC: 0.4 MG/DL (ref 0–0.8)
BUN SERPL-MCNC: 15 MG/DL (ref 7–18)
CALCIUM SERPL-MCNC: 9.6 MG/DL (ref 8.5–10.1)
CHLORIDE SERPL-SCNC: 96 MMOL/L (ref 98–107)
CO2 SERPL-SCNC: 29 MMOL/L (ref 21–32)
CREAT SERPL-MCNC: 1.04 MG/DL (ref 0.7–1.3)
EOSINOPHIL # BLD AUTO: 0 X10(3)/MCL (ref 0–0.9)
EOSINOPHIL NFR BLD AUTO: 0.4 %
ERYTHROCYTE [DISTWIDTH] IN BLOOD BY AUTOMATED COUNT: 15.5 % (ref 11.5–17)
GLOBULIN SER-MCNC: 3.6 GM/DL (ref 2.4–3.5)
GLUCOSE SERPL-MCNC: 71 MG/DL (ref 74–106)
HCT VFR BLD AUTO: 31.5 % (ref 42–52)
HGB BLD-MCNC: 10.3 GM/DL (ref 14–18)
LYMPHOCYTES # BLD AUTO: 2 X10(3)/MCL (ref 0.6–4.6)
LYMPHOCYTES NFR BLD AUTO: 22.9 %
MCH RBC QN AUTO: 29.1 PG (ref 27–31)
MCHC RBC AUTO-ENTMCNC: 32.7 GM/DL (ref 33–36)
MCV RBC AUTO: 89 FL (ref 80–94)
MONOCYTES # BLD AUTO: 0.8 X10(3)/MCL (ref 0.1–1.3)
MONOCYTES NFR BLD AUTO: 9.9 %
NEUTROPHILS # BLD AUTO: 5.5 X10(3)/MCL (ref 2.1–9.2)
NEUTROPHILS NFR BLD AUTO: 63.9 %
PLATELET # BLD AUTO: 308 X10(3)/MCL (ref 130–400)
PMV BLD AUTO: 8 FL (ref 9.4–12.4)
POTASSIUM SERPL-SCNC: 4.2 MMOL/L (ref 3.5–5.1)
PROT SERPL-MCNC: 7.2 GM/DL (ref 6.4–8.2)
PSA SERPL-MCNC: 2.04 NG/ML (ref 0–4)
RBC # BLD AUTO: 3.54 X10(6)/MCL (ref 4.7–6.1)
SODIUM SERPL-SCNC: 131 MMOL/L (ref 136–145)
WBC # SPEC AUTO: 8.6 X10(3)/MCL (ref 4.5–11.5)

## 2019-07-05 ENCOUNTER — HISTORICAL (OUTPATIENT)
Dept: ADMINISTRATIVE | Facility: HOSPITAL | Age: 54
End: 2019-07-05

## 2019-07-05 LAB
ABS NEUT (OLG): 6.1 X10(3)/MCL (ref 2.1–9.2)
ALBUMIN SERPL-MCNC: 3.5 GM/DL (ref 3.4–5)
ALBUMIN/GLOB SERPL: 0.7 {RATIO}
ALP SERPL-CCNC: 81 UNIT/L (ref 45–117)
ALT SERPL-CCNC: 23 UNIT/L (ref 16–61)
AST SERPL-CCNC: 16 UNIT/L (ref 15–37)
BASOPHILS # BLD AUTO: 0.06 X10(3)/MCL (ref 0–0.2)
BASOPHILS NFR BLD AUTO: 0.7 % (ref 0–0.9)
BILIRUB SERPL-MCNC: 0.2 MG/DL (ref 0.2–1)
BILIRUBIN DIRECT+TOT PNL SERPL-MCNC: <0.1 MG/DL (ref 0–0.2)
BILIRUBIN DIRECT+TOT PNL SERPL-MCNC: >0.1 MG/DL (ref 0–1)
BUN SERPL-MCNC: 20 MG/DL (ref 7–18)
CALCIUM SERPL-MCNC: 10.4 MG/DL (ref 8.5–10.1)
CHLORIDE SERPL-SCNC: 101 MMOL/L (ref 98–107)
CO2 SERPL-SCNC: 24 MMOL/L (ref 21–32)
CREAT SERPL-MCNC: 1.2 MG/DL (ref 0.7–1.3)
EOSINOPHIL # BLD AUTO: 0.48 X10(3)/MCL (ref 0–0.9)
EOSINOPHIL NFR BLD AUTO: 5.3 % (ref 0–6.5)
ERYTHROCYTE [DISTWIDTH] IN BLOOD BY AUTOMATED COUNT: 13.7 % (ref 11.5–17)
GLOBULIN SER-MCNC: 5 GM/DL (ref 2–4)
GLUCOSE SERPL-MCNC: 88 MG/DL (ref 74–106)
HCT VFR BLD AUTO: 39 % (ref 42–52)
HGB BLD-MCNC: 13.1 GM/DL (ref 14–18)
IMM GRANULOCYTES # BLD AUTO: 0.04 10*3/UL (ref 0–0.02)
IMM GRANULOCYTES NFR BLD AUTO: 0.4 % (ref 0–0.43)
LYMPHOCYTES # BLD AUTO: 1.53 X10(3)/MCL (ref 0.6–4.6)
LYMPHOCYTES NFR BLD AUTO: 17 % (ref 16.2–38.3)
MCH RBC QN AUTO: 29.7 PG (ref 27–31)
MCHC RBC AUTO-ENTMCNC: 33.6 GM/DL (ref 33–36)
MCV RBC AUTO: 88.4 FL (ref 80–94)
MONOCYTES # BLD AUTO: 0.79 X10(3)/MCL (ref 0.1–1.3)
MONOCYTES NFR BLD AUTO: 8.8 % (ref 4.7–11.3)
NEUTROPHILS # BLD AUTO: 6.1 X10(3)/MCL (ref 2.1–9.2)
NEUTROPHILS NFR BLD AUTO: 67.8 % (ref 49.1–73.4)
NRBC BLD AUTO-RTO: 0 % (ref 0–0.2)
PLATELET # BLD AUTO: 278 X10(3)/MCL (ref 130–400)
PMV BLD AUTO: 9.1 FL (ref 7.4–10.4)
POC CREATININE: 1.2 MG/DL (ref 0.6–1.3)
POTASSIUM SERPL-SCNC: 4.1 MMOL/L (ref 3.5–5.1)
PROT SERPL-MCNC: 8.3 GM/DL (ref 6.4–8.2)
PSA SERPL-MCNC: 5.24 NG/ML (ref 0–4)
RBC # BLD AUTO: 4.41 X10(6)/MCL (ref 4.7–6.1)
SODIUM SERPL-SCNC: 132 MMOL/L (ref 136–145)
WBC # SPEC AUTO: 9 X10(3)/MCL (ref 4.5–11.5)

## 2019-08-05 ENCOUNTER — HISTORICAL (OUTPATIENT)
Dept: ADMINISTRATIVE | Facility: HOSPITAL | Age: 54
End: 2019-08-05

## 2019-08-05 LAB
ABS NEUT (OLG): 7.68 X10(3)/MCL (ref 2.1–9.2)
ALBUMIN SERPL-MCNC: 3.6 GM/DL (ref 3.4–5)
ALBUMIN/GLOB SERPL: 0.9 {RATIO}
ALP SERPL-CCNC: 79 UNIT/L (ref 50–136)
ALT SERPL-CCNC: 24 UNIT/L (ref 12–78)
AST SERPL-CCNC: 14 UNIT/L (ref 15–37)
BASOPHILS # BLD AUTO: 0 X10(3)/MCL (ref 0–0.2)
BASOPHILS NFR BLD AUTO: 0.3 %
BILIRUB SERPL-MCNC: 0.7 MG/DL (ref 0.2–1)
BILIRUBIN DIRECT+TOT PNL SERPL-MCNC: 0.1 MG/DL (ref 0–0.2)
BILIRUBIN DIRECT+TOT PNL SERPL-MCNC: 0.6 MG/DL (ref 0–0.8)
BUN SERPL-MCNC: 24 MG/DL (ref 7–18)
CALCIUM SERPL-MCNC: 10.4 MG/DL (ref 8.5–10.1)
CHLORIDE SERPL-SCNC: 100 MMOL/L (ref 98–107)
CO2 SERPL-SCNC: 21 MMOL/L (ref 21–32)
CREAT SERPL-MCNC: 1.25 MG/DL (ref 0.7–1.3)
EOSINOPHIL # BLD AUTO: 0.5 X10(3)/MCL (ref 0–0.9)
EOSINOPHIL NFR BLD AUTO: 4.2 %
ERYTHROCYTE [DISTWIDTH] IN BLOOD BY AUTOMATED COUNT: 13.7 % (ref 11.5–17)
GLOBULIN SER-MCNC: 4.1 GM/DL (ref 2.4–3.5)
GLUCOSE SERPL-MCNC: 63 MG/DL (ref 74–106)
HCT VFR BLD AUTO: 34.5 % (ref 42–52)
HGB BLD-MCNC: 11.3 GM/DL (ref 14–18)
LYMPHOCYTES # BLD AUTO: 1.8 X10(3)/MCL (ref 0.6–4.6)
LYMPHOCYTES NFR BLD AUTO: 16.1 %
MCH RBC QN AUTO: 29.1 PG (ref 27–31)
MCHC RBC AUTO-ENTMCNC: 32.8 GM/DL (ref 33–36)
MCV RBC AUTO: 88.9 FL (ref 80–94)
MONOCYTES # BLD AUTO: 1 X10(3)/MCL (ref 0.1–1.3)
MONOCYTES NFR BLD AUTO: 9 %
NEUTROPHILS # BLD AUTO: 7.7 X10(3)/MCL (ref 2.1–9.2)
NEUTROPHILS NFR BLD AUTO: 69.9 %
PLATELET # BLD AUTO: 234 X10(3)/MCL (ref 130–400)
PMV BLD AUTO: 8.4 FL (ref 9.4–12.4)
POTASSIUM SERPL-SCNC: 4 MMOL/L (ref 3.5–5.1)
PROT SERPL-MCNC: 7.7 GM/DL (ref 6.4–8.2)
PSA SERPL-MCNC: 7.7 NG/ML (ref 0–4)
RBC # BLD AUTO: 3.88 X10(6)/MCL (ref 4.7–6.1)
SODIUM SERPL-SCNC: 133 MMOL/L (ref 136–145)
WBC # SPEC AUTO: 11 X10(3)/MCL (ref 4.5–11.5)

## 2019-08-06 ENCOUNTER — HISTORICAL (OUTPATIENT)
Dept: INFUSION THERAPY | Facility: HOSPITAL | Age: 54
End: 2019-08-06

## 2019-09-03 ENCOUNTER — HISTORICAL (OUTPATIENT)
Dept: ADMINISTRATIVE | Facility: HOSPITAL | Age: 54
End: 2019-09-03

## 2019-09-03 LAB
ABS NEUT (OLG): 6.69 X10(3)/MCL (ref 2.1–9.2)
ALBUMIN SERPL-MCNC: 3.7 GM/DL (ref 3.4–5)
ALBUMIN/GLOB SERPL: 0.9 RATIO (ref 1.1–2)
ALP SERPL-CCNC: 80 UNIT/L (ref 50–136)
ALT SERPL-CCNC: 32 UNIT/L (ref 12–78)
AST SERPL-CCNC: 21 UNIT/L (ref 15–37)
BASOPHILS # BLD AUTO: 0 X10(3)/MCL (ref 0–0.2)
BASOPHILS NFR BLD AUTO: 0.3 %
BILIRUB SERPL-MCNC: 0.4 MG/DL (ref 0.2–1)
BILIRUBIN DIRECT+TOT PNL SERPL-MCNC: 0.1 MG/DL (ref 0–0.5)
BILIRUBIN DIRECT+TOT PNL SERPL-MCNC: 0.3 MG/DL (ref 0–0.8)
BUN SERPL-MCNC: 24 MG/DL (ref 7–18)
CALCIUM SERPL-MCNC: 9.8 MG/DL (ref 8.5–10.1)
CHLORIDE SERPL-SCNC: 102 MMOL/L (ref 98–107)
CO2 SERPL-SCNC: 25 MMOL/L (ref 21–32)
CREAT SERPL-MCNC: 1.37 MG/DL (ref 0.7–1.3)
EOSINOPHIL # BLD AUTO: 0.2 X10(3)/MCL (ref 0–0.9)
EOSINOPHIL NFR BLD AUTO: 2.2 %
ERYTHROCYTE [DISTWIDTH] IN BLOOD BY AUTOMATED COUNT: 14.5 % (ref 11.5–17)
GLOBULIN SER-MCNC: 4.3 GM/DL (ref 2.4–3.5)
GLUCOSE SERPL-MCNC: 100 MG/DL (ref 74–106)
HCT VFR BLD AUTO: 33.4 % (ref 42–52)
HGB BLD-MCNC: 11.4 GM/DL (ref 14–18)
LYMPHOCYTES # BLD AUTO: 1.6 X10(3)/MCL (ref 0.6–4.6)
LYMPHOCYTES NFR BLD AUTO: 16.6 %
MCH RBC QN AUTO: 30 PG (ref 27–31)
MCHC RBC AUTO-ENTMCNC: 34.1 GM/DL (ref 33–36)
MCV RBC AUTO: 87.9 FL (ref 80–94)
MONOCYTES # BLD AUTO: 1 X10(3)/MCL (ref 0.1–1.3)
MONOCYTES NFR BLD AUTO: 10.4 %
NEUTROPHILS # BLD AUTO: 6.7 X10(3)/MCL (ref 2.1–9.2)
NEUTROPHILS NFR BLD AUTO: 70 %
PLATELET # BLD AUTO: 245 X10(3)/MCL (ref 130–400)
PMV BLD AUTO: 8.4 FL (ref 9.4–12.4)
POTASSIUM SERPL-SCNC: 3.7 MMOL/L (ref 3.5–5.1)
PROT SERPL-MCNC: 8 GM/DL (ref 6.4–8.2)
PSA SERPL-MCNC: 8.22 NG/ML (ref 0–4)
RBC # BLD AUTO: 3.8 X10(6)/MCL (ref 4.7–6.1)
SODIUM SERPL-SCNC: 137 MMOL/L (ref 136–145)
WBC # SPEC AUTO: 9.6 X10(3)/MCL (ref 4.5–11.5)

## 2019-09-04 ENCOUNTER — HISTORICAL (OUTPATIENT)
Dept: INFUSION THERAPY | Facility: HOSPITAL | Age: 54
End: 2019-09-04

## 2019-09-24 ENCOUNTER — HISTORICAL (OUTPATIENT)
Dept: ADMINISTRATIVE | Facility: HOSPITAL | Age: 54
End: 2019-09-24

## 2019-09-24 LAB
ABS NEUT (OLG): 7.18 X10(3)/MCL (ref 2.1–9.2)
ALBUMIN SERPL-MCNC: 3.5 GM/DL (ref 3.4–5)
ALBUMIN/GLOB SERPL: 0.9 RATIO (ref 1.1–2)
ALP SERPL-CCNC: 90 UNIT/L (ref 50–136)
ALT SERPL-CCNC: 32 UNIT/L (ref 12–78)
AST SERPL-CCNC: 23 UNIT/L (ref 15–37)
BASOPHILS # BLD AUTO: 0 X10(3)/MCL (ref 0–0.2)
BASOPHILS NFR BLD AUTO: 0.3 %
BILIRUB SERPL-MCNC: 0.1 MG/DL (ref 0.2–1)
BILIRUBIN DIRECT+TOT PNL SERPL-MCNC: 0 MG/DL (ref 0–0.8)
BILIRUBIN DIRECT+TOT PNL SERPL-MCNC: 0.1 MG/DL (ref 0–0.5)
BUN SERPL-MCNC: 14 MG/DL (ref 7–18)
CALCIUM SERPL-MCNC: 9.3 MG/DL (ref 8.5–10.1)
CHLORIDE SERPL-SCNC: 103 MMOL/L (ref 98–107)
CO2 SERPL-SCNC: 23 MMOL/L (ref 21–32)
CREAT SERPL-MCNC: 0.99 MG/DL (ref 0.7–1.3)
EOSINOPHIL # BLD AUTO: 0 X10(3)/MCL (ref 0–0.9)
EOSINOPHIL NFR BLD AUTO: 0.5 %
ERYTHROCYTE [DISTWIDTH] IN BLOOD BY AUTOMATED COUNT: 15.5 % (ref 11.5–17)
GLOBULIN SER-MCNC: 3.8 GM/DL (ref 2.4–3.5)
GLUCOSE SERPL-MCNC: 85 MG/DL (ref 74–106)
HCT VFR BLD AUTO: 32 % (ref 42–52)
HGB BLD-MCNC: 10.4 GM/DL (ref 14–18)
LYMPHOCYTES # BLD AUTO: 1.2 X10(3)/MCL (ref 0.6–4.6)
LYMPHOCYTES NFR BLD AUTO: 12.8 %
MCH RBC QN AUTO: 30.2 PG (ref 27–31)
MCHC RBC AUTO-ENTMCNC: 32.5 GM/DL (ref 33–36)
MCV RBC AUTO: 93 FL (ref 80–94)
MONOCYTES # BLD AUTO: 0.7 X10(3)/MCL (ref 0.1–1.3)
MONOCYTES NFR BLD AUTO: 7.7 %
NEUTROPHILS # BLD AUTO: 7.2 X10(3)/MCL (ref 2.1–9.2)
NEUTROPHILS NFR BLD AUTO: 76.3 %
PLATELET # BLD AUTO: 211 X10(3)/MCL (ref 130–400)
PMV BLD AUTO: 8 FL (ref 9.4–12.4)
POTASSIUM SERPL-SCNC: 4.3 MMOL/L (ref 3.5–5.1)
PROT SERPL-MCNC: 7.3 GM/DL (ref 6.4–8.2)
RBC # BLD AUTO: 3.44 X10(6)/MCL (ref 4.7–6.1)
SODIUM SERPL-SCNC: 135 MMOL/L (ref 136–145)
TESTOST SERPL-MCNC: 10 NG/DL (ref 241–827)
WBC # SPEC AUTO: 9.4 X10(3)/MCL (ref 4.5–11.5)

## 2019-09-25 ENCOUNTER — HISTORICAL (OUTPATIENT)
Dept: INFUSION THERAPY | Facility: HOSPITAL | Age: 54
End: 2019-09-25

## 2019-10-15 ENCOUNTER — HISTORICAL (OUTPATIENT)
Dept: ADMINISTRATIVE | Facility: HOSPITAL | Age: 54
End: 2019-10-15

## 2019-10-15 LAB
ABS NEUT (OLG): 6.75 X10(3)/MCL (ref 2.1–9.2)
ALBUMIN SERPL-MCNC: 3.7 GM/DL (ref 3.4–5)
ALBUMIN/GLOB SERPL: 1.1 {RATIO}
ALP SERPL-CCNC: 75 UNIT/L (ref 50–136)
ALT SERPL-CCNC: 26 UNIT/L (ref 12–78)
AST SERPL-CCNC: 11 UNIT/L (ref 15–37)
BASOPHILS # BLD AUTO: 0 X10(3)/MCL (ref 0–0.2)
BASOPHILS NFR BLD AUTO: 0.2 %
BILIRUB SERPL-MCNC: 0.4 MG/DL (ref 0.2–1)
BILIRUBIN DIRECT+TOT PNL SERPL-MCNC: 0.1 MG/DL (ref 0–0.2)
BILIRUBIN DIRECT+TOT PNL SERPL-MCNC: 0.3 MG/DL (ref 0–0.8)
BUN SERPL-MCNC: 13 MG/DL (ref 7–18)
CALCIUM SERPL-MCNC: 9.7 MG/DL (ref 8.5–10.1)
CHLORIDE SERPL-SCNC: 99 MMOL/L (ref 98–107)
CO2 SERPL-SCNC: 26 MMOL/L (ref 21–32)
CREAT SERPL-MCNC: 1.03 MG/DL (ref 0.7–1.3)
EOSINOPHIL # BLD AUTO: 0.1 X10(3)/MCL (ref 0–0.9)
EOSINOPHIL NFR BLD AUTO: 0.7 %
ERYTHROCYTE [DISTWIDTH] IN BLOOD BY AUTOMATED COUNT: 15.5 % (ref 11.5–17)
GLOBULIN SER-MCNC: 3.5 GM/DL (ref 2.4–3.5)
GLUCOSE SERPL-MCNC: 69 MG/DL (ref 74–106)
HCT VFR BLD AUTO: 33.1 % (ref 42–52)
HGB BLD-MCNC: 10.7 GM/DL (ref 14–18)
LYMPHOCYTES # BLD AUTO: 1.5 X10(3)/MCL (ref 0.6–4.6)
LYMPHOCYTES NFR BLD AUTO: 15.7 %
MCH RBC QN AUTO: 30.5 PG (ref 27–31)
MCHC RBC AUTO-ENTMCNC: 32.3 GM/DL (ref 33–36)
MCV RBC AUTO: 94.3 FL (ref 80–94)
MONOCYTES # BLD AUTO: 0.9 X10(3)/MCL (ref 0.1–1.3)
MONOCYTES NFR BLD AUTO: 10 %
NEUTROPHILS # BLD AUTO: 6.8 X10(3)/MCL (ref 2.1–9.2)
NEUTROPHILS NFR BLD AUTO: 71.7 %
PLATELET # BLD AUTO: 250 X10(3)/MCL (ref 130–400)
PMV BLD AUTO: 8.1 FL (ref 9.4–12.4)
POTASSIUM SERPL-SCNC: 4 MMOL/L (ref 3.5–5.1)
PROT SERPL-MCNC: 7.2 GM/DL (ref 6.4–8.2)
PSA SERPL-MCNC: 8.29 NG/ML (ref 0–4)
RBC # BLD AUTO: 3.51 X10(6)/MCL (ref 4.7–6.1)
SODIUM SERPL-SCNC: 136 MMOL/L (ref 136–145)
WBC # SPEC AUTO: 9.4 X10(3)/MCL (ref 4.5–11.5)

## 2019-10-16 ENCOUNTER — HISTORICAL (OUTPATIENT)
Dept: INFUSION THERAPY | Facility: HOSPITAL | Age: 54
End: 2019-10-16

## 2019-11-05 ENCOUNTER — HISTORICAL (OUTPATIENT)
Dept: ADMINISTRATIVE | Facility: HOSPITAL | Age: 54
End: 2019-11-05

## 2019-11-05 LAB
ABS NEUT (OLG): 8.73 X10(3)/MCL (ref 2.1–9.2)
ALBUMIN SERPL-MCNC: 3.8 GM/DL (ref 3.4–5)
ALBUMIN/GLOB SERPL: 1 RATIO (ref 1.1–2)
ALP SERPL-CCNC: 77 UNIT/L (ref 50–136)
ALT SERPL-CCNC: 20 UNIT/L (ref 12–78)
AST SERPL-CCNC: 12 UNIT/L (ref 15–37)
BASOPHILS # BLD AUTO: 0 X10(3)/MCL (ref 0–0.2)
BASOPHILS NFR BLD AUTO: 0.5 %
BILIRUB SERPL-MCNC: 0.3 MG/DL (ref 0.2–1)
BILIRUBIN DIRECT+TOT PNL SERPL-MCNC: 0.1 MG/DL (ref 0–0.5)
BILIRUBIN DIRECT+TOT PNL SERPL-MCNC: 0.2 MG/DL (ref 0–0.8)
BUN SERPL-MCNC: 11 MG/DL (ref 7–18)
CALCIUM SERPL-MCNC: 9.4 MG/DL (ref 8.5–10.1)
CHLORIDE SERPL-SCNC: 98 MMOL/L (ref 98–107)
CO2 SERPL-SCNC: 25 MMOL/L (ref 21–32)
CREAT SERPL-MCNC: 1.05 MG/DL (ref 0.7–1.3)
EOSINOPHIL # BLD AUTO: 0 X10(3)/MCL (ref 0–0.9)
EOSINOPHIL NFR BLD AUTO: 0.3 %
ERYTHROCYTE [DISTWIDTH] IN BLOOD BY AUTOMATED COUNT: 14.5 % (ref 11.5–17)
GLOBULIN SER-MCNC: 3.8 GM/DL (ref 2.4–3.5)
GLUCOSE SERPL-MCNC: 141 MG/DL (ref 74–106)
HCT VFR BLD AUTO: 34.3 % (ref 42–52)
HGB BLD-MCNC: 11.3 GM/DL (ref 14–18)
LYMPHOCYTES # BLD AUTO: 1 X10(3)/MCL (ref 0.6–4.6)
LYMPHOCYTES NFR BLD AUTO: 9.2 %
MCH RBC QN AUTO: 31.3 PG (ref 27–31)
MCHC RBC AUTO-ENTMCNC: 32.9 GM/DL (ref 33–36)
MCV RBC AUTO: 95 FL (ref 80–94)
MONOCYTES # BLD AUTO: 0.9 X10(3)/MCL (ref 0.1–1.3)
MONOCYTES NFR BLD AUTO: 7.9 %
NEUTROPHILS # BLD AUTO: 8.7 X10(3)/MCL (ref 2.1–9.2)
NEUTROPHILS NFR BLD AUTO: 80.5 %
PLATELET # BLD AUTO: 257 X10(3)/MCL (ref 130–400)
PMV BLD AUTO: 8.1 FL (ref 9.4–12.4)
POTASSIUM SERPL-SCNC: 4.4 MMOL/L (ref 3.5–5.1)
PROT SERPL-MCNC: 7.6 GM/DL (ref 6.4–8.2)
PSA SERPL-MCNC: 9.89 NG/ML (ref 0–4)
RBC # BLD AUTO: 3.61 X10(6)/MCL (ref 4.7–6.1)
SODIUM SERPL-SCNC: 133 MMOL/L (ref 136–145)
WBC # SPEC AUTO: 10.8 X10(3)/MCL (ref 4.5–11.5)

## 2019-12-03 ENCOUNTER — HISTORICAL (OUTPATIENT)
Dept: RADIOLOGY | Facility: HOSPITAL | Age: 54
End: 2019-12-03

## 2019-12-03 LAB
ABS NEUT (OLG): 8.25 X10(3)/MCL (ref 2.1–9.2)
ALBUMIN SERPL-MCNC: 3.4 GM/DL (ref 3.4–5)
ALBUMIN/GLOB SERPL: 0.8 {RATIO}
ALP SERPL-CCNC: 82 UNIT/L (ref 45–117)
ALT SERPL-CCNC: 25 UNIT/L (ref 16–61)
AST SERPL-CCNC: 11 UNIT/L (ref 15–37)
BASOPHILS # BLD AUTO: 0.04 X10(3)/MCL (ref 0–0.2)
BASOPHILS NFR BLD AUTO: 0.4 % (ref 0–0.9)
BILIRUB SERPL-MCNC: 0.3 MG/DL (ref 0.2–1)
BILIRUBIN DIRECT+TOT PNL SERPL-MCNC: <0.1 MG/DL (ref 0–0.2)
BILIRUBIN DIRECT+TOT PNL SERPL-MCNC: >0.2 MG/DL (ref 0–1)
BUN SERPL-MCNC: 14 MG/DL (ref 7–18)
CALCIUM SERPL-MCNC: 8.5 MG/DL (ref 8.5–10.1)
CHLORIDE SERPL-SCNC: 95 MMOL/L (ref 98–107)
CO2 SERPL-SCNC: 24 MMOL/L (ref 21–32)
CREAT SERPL-MCNC: 1.32 MG/DL (ref 0.7–1.3)
EOSINOPHIL # BLD AUTO: 0.15 X10(3)/MCL (ref 0–0.9)
EOSINOPHIL NFR BLD AUTO: 1.5 % (ref 0–6.5)
ERYTHROCYTE [DISTWIDTH] IN BLOOD BY AUTOMATED COUNT: 13.4 % (ref 11.5–17)
GLOBULIN SER-MCNC: 4 GM/DL (ref 2–4)
GLUCOSE SERPL-MCNC: 104 MG/DL (ref 74–106)
HCT VFR BLD AUTO: 33.9 % (ref 42–52)
HGB BLD-MCNC: 11.5 GM/DL (ref 14–18)
IMM GRANULOCYTES # BLD AUTO: 0.08 10*3/UL (ref 0–0.02)
IMM GRANULOCYTES NFR BLD AUTO: 0.8 % (ref 0–0.43)
LYMPHOCYTES # BLD AUTO: 1.16 X10(3)/MCL (ref 0.6–4.6)
LYMPHOCYTES NFR BLD AUTO: 11.3 % (ref 16.2–38.3)
MCH RBC QN AUTO: 30.5 PG (ref 27–31)
MCHC RBC AUTO-ENTMCNC: 33.9 GM/DL (ref 33–36)
MCV RBC AUTO: 89.9 FL (ref 80–94)
MONOCYTES # BLD AUTO: 0.63 X10(3)/MCL (ref 0.1–1.3)
MONOCYTES NFR BLD AUTO: 6.1 % (ref 4.7–11.3)
NEUTROPHILS # BLD AUTO: 8.25 X10(3)/MCL (ref 2.1–9.2)
NEUTROPHILS NFR BLD AUTO: 79.9 % (ref 49.1–73.4)
NRBC BLD AUTO-RTO: 0 % (ref 0–0.2)
PLATELET # BLD AUTO: 204 X10(3)/MCL (ref 130–400)
PMV BLD AUTO: 8.7 FL (ref 7.4–10.4)
POTASSIUM SERPL-SCNC: 4.4 MMOL/L (ref 3.5–5.1)
PROT SERPL-MCNC: 7.7 GM/DL (ref 6.4–8.2)
PSA SERPL-MCNC: 10.6 NG/ML (ref 0–4)
RBC # BLD AUTO: 3.77 X10(6)/MCL (ref 4.7–6.1)
SODIUM SERPL-SCNC: 128 MMOL/L (ref 136–145)
WBC # SPEC AUTO: 10.3 X10(3)/MCL (ref 4.5–11.5)

## 2019-12-05 ENCOUNTER — HISTORICAL (OUTPATIENT)
Dept: ADMINISTRATIVE | Facility: HOSPITAL | Age: 54
End: 2019-12-05

## 2019-12-05 LAB
ABS NEUT (OLG): 8.79 X10(3)/MCL (ref 2.1–9.2)
ALBUMIN SERPL-MCNC: 3.6 GM/DL (ref 3.4–5)
ALBUMIN/GLOB SERPL: 0.9 RATIO (ref 1.1–2)
ALP SERPL-CCNC: 86 UNIT/L (ref 50–136)
ALT SERPL-CCNC: 28 UNIT/L (ref 12–78)
AST SERPL-CCNC: 11 UNIT/L (ref 15–37)
BASOPHILS # BLD AUTO: 0 X10(3)/MCL (ref 0–0.2)
BASOPHILS NFR BLD AUTO: 0.2 %
BILIRUB SERPL-MCNC: 0.3 MG/DL (ref 0.2–1)
BILIRUBIN DIRECT+TOT PNL SERPL-MCNC: 0.1 MG/DL (ref 0–0.5)
BILIRUBIN DIRECT+TOT PNL SERPL-MCNC: 0.2 MG/DL (ref 0–0.8)
BUN SERPL-MCNC: 18 MG/DL (ref 7–18)
CALCIUM SERPL-MCNC: 9.6 MG/DL (ref 8.5–10.1)
CHLORIDE SERPL-SCNC: 96 MMOL/L (ref 98–107)
CO2 SERPL-SCNC: 26 MMOL/L (ref 21–32)
CREAT SERPL-MCNC: 1.24 MG/DL (ref 0.7–1.3)
EOSINOPHIL # BLD AUTO: 0.1 X10(3)/MCL (ref 0–0.9)
EOSINOPHIL NFR BLD AUTO: 1.1 %
ERYTHROCYTE [DISTWIDTH] IN BLOOD BY AUTOMATED COUNT: 13.2 % (ref 11.5–17)
GLOBULIN SER-MCNC: 3.9 GM/DL (ref 2.4–3.5)
GLUCOSE SERPL-MCNC: 97 MG/DL (ref 74–106)
HCT VFR BLD AUTO: 35.7 % (ref 42–52)
HGB BLD-MCNC: 12 GM/DL (ref 14–18)
LYMPHOCYTES # BLD AUTO: 0.8 X10(3)/MCL (ref 0.6–4.6)
LYMPHOCYTES NFR BLD AUTO: 7.7 %
MCH RBC QN AUTO: 30.9 PG (ref 27–31)
MCHC RBC AUTO-ENTMCNC: 33.6 GM/DL (ref 33–36)
MCV RBC AUTO: 92 FL (ref 80–94)
MONOCYTES # BLD AUTO: 0.8 X10(3)/MCL (ref 0.1–1.3)
MONOCYTES NFR BLD AUTO: 7.4 %
NEUTROPHILS # BLD AUTO: 8.8 X10(3)/MCL (ref 2.1–9.2)
NEUTROPHILS NFR BLD AUTO: 82.9 %
PLATELET # BLD AUTO: 200 X10(3)/MCL (ref 130–400)
PMV BLD AUTO: 8.5 FL (ref 9.4–12.4)
POTASSIUM SERPL-SCNC: 4 MMOL/L (ref 3.5–5.1)
PROT SERPL-MCNC: 7.5 GM/DL (ref 6.4–8.2)
RBC # BLD AUTO: 3.88 X10(6)/MCL (ref 4.7–6.1)
SODIUM SERPL-SCNC: 132 MMOL/L (ref 136–145)
WBC # SPEC AUTO: 10.6 X10(3)/MCL (ref 4.5–11.5)

## 2019-12-11 ENCOUNTER — HISTORICAL (OUTPATIENT)
Dept: INFUSION THERAPY | Facility: HOSPITAL | Age: 54
End: 2019-12-11

## 2019-12-12 ENCOUNTER — HISTORICAL (OUTPATIENT)
Dept: INFUSION THERAPY | Facility: HOSPITAL | Age: 54
End: 2019-12-12

## 2019-12-22 ENCOUNTER — ANESTHESIA (OUTPATIENT)
Dept: SURGERY | Facility: HOSPITAL | Age: 54
DRG: 329 | End: 2019-12-22
Payer: MEDICAID

## 2019-12-22 ENCOUNTER — ANESTHESIA EVENT (OUTPATIENT)
Dept: SURGERY | Facility: HOSPITAL | Age: 54
DRG: 329 | End: 2019-12-22
Payer: MEDICAID

## 2019-12-22 ENCOUNTER — HOSPITAL ENCOUNTER (INPATIENT)
Facility: HOSPITAL | Age: 54
LOS: 10 days | Discharge: HOME | DRG: 329 | End: 2020-01-01
Attending: EMERGENCY MEDICINE | Admitting: SURGERY
Payer: MEDICAID

## 2019-12-22 DIAGNOSIS — R10.9 ABDOMINAL PAIN: ICD-10-CM

## 2019-12-22 DIAGNOSIS — K56.609 SBO (SMALL BOWEL OBSTRUCTION): Primary | ICD-10-CM

## 2019-12-22 LAB
ABO + RH BLD: NORMAL
ALBUMIN SERPL BCP-MCNC: 2.7 G/DL (ref 3.5–5.2)
ALBUMIN SERPL BCP-MCNC: 3.1 G/DL (ref 3.5–5.2)
ALLENS TEST: ABNORMAL
ALLENS TEST: ABNORMAL
ALP SERPL-CCNC: 136 U/L (ref 55–135)
ALP SERPL-CCNC: 89 U/L (ref 55–135)
ALT SERPL W/O P-5'-P-CCNC: 13 U/L (ref 10–44)
ALT SERPL W/O P-5'-P-CCNC: 8 U/L (ref 10–44)
ANION GAP SERPL CALC-SCNC: 11 MMOL/L (ref 8–16)
ANION GAP SERPL CALC-SCNC: 16 MMOL/L (ref 8–16)
ANISOCYTOSIS BLD QL SMEAR: SLIGHT
ANISOCYTOSIS BLD QL SMEAR: SLIGHT
APTT BLDCRRT: 28.6 SEC (ref 21–32)
AST SERPL-CCNC: 14 U/L (ref 10–40)
AST SERPL-CCNC: 14 U/L (ref 10–40)
BACTERIA #/AREA URNS AUTO: ABNORMAL /HPF
BASOPHILS NFR BLD: 0 % (ref 0–1.9)
BASOPHILS NFR BLD: 0 % (ref 0–1.9)
BILIRUB SERPL-MCNC: 0.3 MG/DL (ref 0.1–1)
BILIRUB SERPL-MCNC: 0.5 MG/DL (ref 0.1–1)
BILIRUB UR QL STRIP: NEGATIVE
BLD GP AB SCN CELLS X3 SERPL QL: NORMAL
BUN SERPL-MCNC: 11 MG/DL (ref 6–20)
BUN SERPL-MCNC: 13 MG/DL (ref 6–20)
BURR CELLS BLD QL SMEAR: ABNORMAL
CALCIUM SERPL-MCNC: 7.8 MG/DL (ref 8.7–10.5)
CALCIUM SERPL-MCNC: 8.5 MG/DL (ref 8.7–10.5)
CHLORIDE SERPL-SCNC: 95 MMOL/L (ref 95–110)
CHLORIDE SERPL-SCNC: 98 MMOL/L (ref 95–110)
CLARITY UR REFRACT.AUTO: CLEAR
CO2 SERPL-SCNC: 21 MMOL/L (ref 23–29)
CO2 SERPL-SCNC: 21 MMOL/L (ref 23–29)
COLOR UR AUTO: YELLOW
CREAT SERPL-MCNC: 1.6 MG/DL (ref 0.5–1.4)
CREAT SERPL-MCNC: 1.7 MG/DL (ref 0.5–1.4)
DELSYS: ABNORMAL
DIFFERENTIAL METHOD: ABNORMAL
DIFFERENTIAL METHOD: ABNORMAL
DOHLE BOD BLD QL SMEAR: PRESENT
DOHLE BOD BLD QL SMEAR: PRESENT
EOSINOPHIL NFR BLD: 0 % (ref 0–8)
EOSINOPHIL NFR BLD: 0 % (ref 0–8)
ERYTHROCYTE [DISTWIDTH] IN BLOOD BY AUTOMATED COUNT: 13.4 % (ref 11.5–14.5)
ERYTHROCYTE [DISTWIDTH] IN BLOOD BY AUTOMATED COUNT: 13.6 % (ref 11.5–14.5)
EST. GFR  (AFRICAN AMERICAN): 51.3 ML/MIN/1.73 M^2
EST. GFR  (AFRICAN AMERICAN): 55.2 ML/MIN/1.73 M^2
EST. GFR  (NON AFRICAN AMERICAN): 44.4 ML/MIN/1.73 M^2
EST. GFR  (NON AFRICAN AMERICAN): 47.8 ML/MIN/1.73 M^2
GLUCOSE SERPL-MCNC: 121 MG/DL (ref 70–110)
GLUCOSE SERPL-MCNC: 128 MG/DL (ref 70–110)
GLUCOSE UR QL STRIP: NEGATIVE
HCO3 UR-SCNC: 22.2 MMOL/L (ref 24–28)
HCO3 UR-SCNC: 22.3 MMOL/L (ref 24–28)
HCT VFR BLD AUTO: 36.9 % (ref 40–54)
HCT VFR BLD AUTO: 43 % (ref 40–54)
HGB BLD-MCNC: 12.3 G/DL (ref 14–18)
HGB BLD-MCNC: 14.3 G/DL (ref 14–18)
HGB UR QL STRIP: ABNORMAL
HYALINE CASTS UR QL AUTO: 1 /LPF
HYPOCHROMIA BLD QL SMEAR: ABNORMAL
IMM GRANULOCYTES # BLD AUTO: ABNORMAL K/UL (ref 0–0.04)
IMM GRANULOCYTES # BLD AUTO: ABNORMAL K/UL (ref 0–0.04)
IMM GRANULOCYTES NFR BLD AUTO: ABNORMAL % (ref 0–0.5)
IMM GRANULOCYTES NFR BLD AUTO: ABNORMAL % (ref 0–0.5)
INR PPP: 1 (ref 0.8–1.2)
INR PPP: 1.2 (ref 0.8–1.2)
KETONES UR QL STRIP: NEGATIVE
LACTATE SERPL-SCNC: 2.1 MMOL/L (ref 0.5–2.2)
LDH SERPL L TO P-CCNC: 2.96 MMOL/L (ref 0.36–1.25)
LDH SERPL L TO P-CCNC: 4.28 MMOL/L (ref 0.36–1.25)
LEUKOCYTE ESTERASE UR QL STRIP: ABNORMAL
LIPASE SERPL-CCNC: 11 U/L (ref 4–60)
LYMPHOCYTES NFR BLD: 2 % (ref 18–48)
LYMPHOCYTES NFR BLD: 4 % (ref 18–48)
MAGNESIUM SERPL-MCNC: 1.1 MG/DL (ref 1.6–2.6)
MAGNESIUM SERPL-MCNC: 2 MG/DL (ref 1.6–2.6)
MCH RBC QN AUTO: 30.3 PG (ref 27–31)
MCH RBC QN AUTO: 30.4 PG (ref 27–31)
MCHC RBC AUTO-ENTMCNC: 33.3 G/DL (ref 32–36)
MCHC RBC AUTO-ENTMCNC: 33.3 G/DL (ref 32–36)
MCV RBC AUTO: 91 FL (ref 82–98)
MCV RBC AUTO: 92 FL (ref 82–98)
METAMYELOCYTES NFR BLD MANUAL: 2 %
METAMYELOCYTES NFR BLD MANUAL: 3 %
MICROSCOPIC COMMENT: ABNORMAL
MODE: ABNORMAL
MONOCYTES NFR BLD: 2 % (ref 4–15)
MONOCYTES NFR BLD: 3 % (ref 4–15)
MYELOCYTES NFR BLD MANUAL: 1 %
MYELOCYTES NFR BLD MANUAL: 1 %
NEUTROPHILS NFR BLD: 78 % (ref 38–73)
NEUTROPHILS NFR BLD: 83 % (ref 38–73)
NEUTS BAND NFR BLD MANUAL: 13 %
NEUTS BAND NFR BLD MANUAL: 8 %
NITRITE UR QL STRIP: NEGATIVE
NRBC BLD-RTO: 0 /100 WBC
NRBC BLD-RTO: 0 /100 WBC
PCO2 BLDA: 42 MMHG (ref 35–45)
PCO2 BLDA: 49.1 MMHG (ref 35–45)
PH SMN: 7.27 [PH] (ref 7.35–7.45)
PH SMN: 7.33 [PH] (ref 7.35–7.45)
PH UR STRIP: 7 [PH] (ref 5–8)
PHOSPHATE SERPL-MCNC: 6.1 MG/DL (ref 2.7–4.5)
PLATELET # BLD AUTO: 224 K/UL (ref 150–350)
PLATELET # BLD AUTO: 286 K/UL (ref 150–350)
PLATELET BLD QL SMEAR: ABNORMAL
PMV BLD AUTO: 8.8 FL (ref 9.2–12.9)
PMV BLD AUTO: 9.3 FL (ref 9.2–12.9)
PO2 BLDA: 59 MMHG (ref 80–100)
PO2 BLDA: 86 MMHG (ref 80–100)
POC BE: -4 MMOL/L
POC BE: -5 MMOL/L
POC SATURATED O2: 86 % (ref 95–100)
POC SATURATED O2: 96 % (ref 95–100)
POC TCO2: 23 MMOL/L (ref 23–27)
POC TCO2: 24 MMOL/L (ref 23–27)
POCT GLUCOSE: 132 MG/DL (ref 70–110)
POIKILOCYTOSIS BLD QL SMEAR: SLIGHT
POIKILOCYTOSIS BLD QL SMEAR: SLIGHT
POLYCHROMASIA BLD QL SMEAR: ABNORMAL
POTASSIUM SERPL-SCNC: 4.6 MMOL/L (ref 3.5–5.1)
POTASSIUM SERPL-SCNC: 4.6 MMOL/L (ref 3.5–5.1)
PROT SERPL-MCNC: 4.6 G/DL (ref 6–8.4)
PROT SERPL-MCNC: 6.4 G/DL (ref 6–8.4)
PROT UR QL STRIP: ABNORMAL
PROTHROMBIN TIME: 10.7 SEC (ref 9–12.5)
PROTHROMBIN TIME: 12.2 SEC (ref 9–12.5)
RBC # BLD AUTO: 4.06 M/UL (ref 4.6–6.2)
RBC # BLD AUTO: 4.7 M/UL (ref 4.6–6.2)
RBC #/AREA URNS AUTO: 2 /HPF (ref 0–4)
SAMPLE: ABNORMAL
SAMPLE: ABNORMAL
SITE: ABNORMAL
SITE: ABNORMAL
SODIUM SERPL-SCNC: 127 MMOL/L (ref 136–145)
SODIUM SERPL-SCNC: 135 MMOL/L (ref 136–145)
SP GR UR STRIP: 1.01 (ref 1–1.03)
TOXIC GRANULES BLD QL SMEAR: PRESENT
TOXIC GRANULES BLD QL SMEAR: PRESENT
URN SPEC COLLECT METH UR: ABNORMAL
WBC # BLD AUTO: 49.42 K/UL (ref 3.9–12.7)
WBC # BLD AUTO: 62.36 K/UL (ref 3.9–12.7)
WBC #/AREA URNS AUTO: 3 /HPF (ref 0–5)

## 2019-12-22 PROCEDURE — 88307 TISSUE EXAM BY PATHOLOGIST: CPT | Mod: 26,,, | Performed by: PATHOLOGY

## 2019-12-22 PROCEDURE — 63600175 PHARM REV CODE 636 W HCPCS: Performed by: STUDENT IN AN ORGANIZED HEALTH CARE EDUCATION/TRAINING PROGRAM

## 2019-12-22 PROCEDURE — 80053 COMPREHEN METABOLIC PANEL: CPT | Mod: 91

## 2019-12-22 PROCEDURE — 88112 CYTOPATH CELL ENHANCE TECH: CPT | Performed by: PATHOLOGY

## 2019-12-22 PROCEDURE — 63600175 PHARM REV CODE 636 W HCPCS: Performed by: NURSE ANESTHETIST, CERTIFIED REGISTERED

## 2019-12-22 PROCEDURE — 86920 COMPATIBILITY TEST SPIN: CPT

## 2019-12-22 PROCEDURE — 83605 ASSAY OF LACTIC ACID: CPT

## 2019-12-22 PROCEDURE — 27201423 OPTIME MED/SURG SUP & DEVICES STERILE SUPPLY: Performed by: SURGERY

## 2019-12-22 PROCEDURE — 93010 EKG 12-LEAD: ICD-10-PCS | Mod: ,,, | Performed by: INTERNAL MEDICINE

## 2019-12-22 PROCEDURE — 20000000 HC ICU ROOM

## 2019-12-22 PROCEDURE — 36000709 HC OR TIME LEV III EA ADD 15 MIN: Performed by: SURGERY

## 2019-12-22 PROCEDURE — 88112 CYTOPATH CELL ENHANCE TECH: CPT | Mod: 26,,, | Performed by: PATHOLOGY

## 2019-12-22 PROCEDURE — 96374 THER/PROPH/DIAG INJ IV PUSH: CPT

## 2019-12-22 PROCEDURE — 93005 ELECTROCARDIOGRAM TRACING: CPT

## 2019-12-22 PROCEDURE — 85007 BL SMEAR W/DIFF WBC COUNT: CPT | Mod: 91

## 2019-12-22 PROCEDURE — 88305 TISSUE EXAM BY PATHOLOGIST: ICD-10-PCS | Mod: 26,,, | Performed by: PATHOLOGY

## 2019-12-22 PROCEDURE — 85027 COMPLETE CBC AUTOMATED: CPT

## 2019-12-22 PROCEDURE — 99223 1ST HOSP IP/OBS HIGH 75: CPT | Mod: ,,, | Performed by: SURGERY

## 2019-12-22 PROCEDURE — 44125 PR RESECT SMALL INTEST W ENTEROSTOMY: ICD-10-PCS | Mod: ,,, | Performed by: SURGERY

## 2019-12-22 PROCEDURE — C1729 CATH, DRAINAGE: HCPCS | Performed by: SURGERY

## 2019-12-22 PROCEDURE — 80053 COMPREHEN METABOLIC PANEL: CPT

## 2019-12-22 PROCEDURE — 99900035 HC TECH TIME PER 15 MIN (STAT)

## 2019-12-22 PROCEDURE — 85730 THROMBOPLASTIN TIME PARTIAL: CPT

## 2019-12-22 PROCEDURE — 85610 PROTHROMBIN TIME: CPT | Mod: 91

## 2019-12-22 PROCEDURE — 37799 UNLISTED PX VASCULAR SURGERY: CPT

## 2019-12-22 PROCEDURE — 37000009 HC ANESTHESIA EA ADD 15 MINS: Performed by: SURGERY

## 2019-12-22 PROCEDURE — 88112 PR  CYTOPATH, CELL ENHANCE TECH: ICD-10-PCS | Mod: 26,,, | Performed by: PATHOLOGY

## 2019-12-22 PROCEDURE — 85025 COMPLETE CBC W/AUTO DIFF WBC: CPT

## 2019-12-22 PROCEDURE — 93010 ELECTROCARDIOGRAM REPORT: CPT | Mod: ,,, | Performed by: INTERNAL MEDICINE

## 2019-12-22 PROCEDURE — D9220A PRA ANESTHESIA: Mod: ANES,,, | Performed by: ANESTHESIOLOGY

## 2019-12-22 PROCEDURE — 99285 PR EMERGENCY DEPT VISIT,LEVEL V: ICD-10-PCS | Mod: ,,, | Performed by: EMERGENCY MEDICINE

## 2019-12-22 PROCEDURE — C1769 GUIDE WIRE: HCPCS | Performed by: SURGERY

## 2019-12-22 PROCEDURE — 87040 BLOOD CULTURE FOR BACTERIA: CPT | Mod: 59

## 2019-12-22 PROCEDURE — 50780 PR REIMPLANT URETER,SINGLE URETER: ICD-10-PCS | Mod: LT,,, | Performed by: UROLOGY

## 2019-12-22 PROCEDURE — C2617 STENT, NON-COR, TEM W/O DEL: HCPCS | Performed by: SURGERY

## 2019-12-22 PROCEDURE — 99285 EMERGENCY DEPT VISIT HI MDM: CPT | Mod: ,,, | Performed by: EMERGENCY MEDICINE

## 2019-12-22 PROCEDURE — 85610 PROTHROMBIN TIME: CPT

## 2019-12-22 PROCEDURE — 96375 TX/PRO/DX INJ NEW DRUG ADDON: CPT

## 2019-12-22 PROCEDURE — 88305 TISSUE EXAM BY PATHOLOGIST: ICD-10-PCS | Mod: 26,59,, | Performed by: PATHOLOGY

## 2019-12-22 PROCEDURE — 36620 ARTERIAL: ICD-10-PCS | Mod: 59,,, | Performed by: ANESTHESIOLOGY

## 2019-12-22 PROCEDURE — 63600175 PHARM REV CODE 636 W HCPCS: Performed by: EMERGENCY MEDICINE

## 2019-12-22 PROCEDURE — 82803 BLOOD GASES ANY COMBINATION: CPT

## 2019-12-22 PROCEDURE — D9220A PRA ANESTHESIA: Mod: CRNA,,, | Performed by: NURSE ANESTHETIST, CERTIFIED REGISTERED

## 2019-12-22 PROCEDURE — 83735 ASSAY OF MAGNESIUM: CPT

## 2019-12-22 PROCEDURE — 88305 TISSUE EXAM BY PATHOLOGIST: CPT | Mod: 26,,, | Performed by: PATHOLOGY

## 2019-12-22 PROCEDURE — 37000008 HC ANESTHESIA 1ST 15 MINUTES: Performed by: SURGERY

## 2019-12-22 PROCEDURE — 27000221 HC OXYGEN, UP TO 24 HOURS

## 2019-12-22 PROCEDURE — 25000003 PHARM REV CODE 250: Performed by: STUDENT IN AN ORGANIZED HEALTH CARE EDUCATION/TRAINING PROGRAM

## 2019-12-22 PROCEDURE — 94667 MNPJ CHEST WALL 1ST: CPT

## 2019-12-22 PROCEDURE — 99285 EMERGENCY DEPT VISIT HI MDM: CPT | Mod: 25

## 2019-12-22 PROCEDURE — 25000003 PHARM REV CODE 250: Performed by: NURSE ANESTHETIST, CERTIFIED REGISTERED

## 2019-12-22 PROCEDURE — 88305 TISSUE EXAM BY PATHOLOGIST: CPT | Mod: 59 | Performed by: PATHOLOGY

## 2019-12-22 PROCEDURE — 83735 ASSAY OF MAGNESIUM: CPT | Mod: 91

## 2019-12-22 PROCEDURE — D9220A PRA ANESTHESIA: ICD-10-PCS | Mod: ANES,,, | Performed by: ANESTHESIOLOGY

## 2019-12-22 PROCEDURE — 50780 REIMPLANT URETER IN BLADDER: CPT | Mod: LT,,, | Performed by: UROLOGY

## 2019-12-22 PROCEDURE — 83690 ASSAY OF LIPASE: CPT

## 2019-12-22 PROCEDURE — 44125 REMOVAL OF SMALL INTESTINE: CPT | Mod: ,,, | Performed by: SURGERY

## 2019-12-22 PROCEDURE — C1758 CATHETER, URETERAL: HCPCS | Performed by: SURGERY

## 2019-12-22 PROCEDURE — 86901 BLOOD TYPING SEROLOGIC RH(D): CPT

## 2019-12-22 PROCEDURE — S0028 INJECTION, FAMOTIDINE, 20 MG: HCPCS | Performed by: STUDENT IN AN ORGANIZED HEALTH CARE EDUCATION/TRAINING PROGRAM

## 2019-12-22 PROCEDURE — 81001 URINALYSIS AUTO W/SCOPE: CPT

## 2019-12-22 PROCEDURE — 88305 TISSUE EXAM BY PATHOLOGIST: CPT | Performed by: PATHOLOGY

## 2019-12-22 PROCEDURE — 25000242 PHARM REV CODE 250 ALT 637 W/ HCPCS: Performed by: STUDENT IN AN ORGANIZED HEALTH CARE EDUCATION/TRAINING PROGRAM

## 2019-12-22 PROCEDURE — 88307 PR  SURG PATH,LEVEL V: ICD-10-PCS | Mod: 26,,, | Performed by: PATHOLOGY

## 2019-12-22 PROCEDURE — 94761 N-INVAS EAR/PLS OXIMETRY MLT: CPT

## 2019-12-22 PROCEDURE — 88307 TISSUE EXAM BY PATHOLOGIST: CPT | Performed by: PATHOLOGY

## 2019-12-22 PROCEDURE — P9021 RED BLOOD CELLS UNIT: HCPCS

## 2019-12-22 PROCEDURE — 36000708 HC OR TIME LEV III 1ST 15 MIN: Performed by: SURGERY

## 2019-12-22 PROCEDURE — 94640 AIRWAY INHALATION TREATMENT: CPT

## 2019-12-22 PROCEDURE — 84100 ASSAY OF PHOSPHORUS: CPT

## 2019-12-22 PROCEDURE — D9220A PRA ANESTHESIA: ICD-10-PCS | Mod: CRNA,,, | Performed by: NURSE ANESTHETIST, CERTIFIED REGISTERED

## 2019-12-22 PROCEDURE — P9045 ALBUMIN (HUMAN), 5%, 250 ML: HCPCS | Mod: JG | Performed by: NURSE ANESTHETIST, CERTIFIED REGISTERED

## 2019-12-22 PROCEDURE — 36620 INSERTION CATHETER ARTERY: CPT | Mod: 59,,, | Performed by: ANESTHESIOLOGY

## 2019-12-22 PROCEDURE — 99223 PR INITIAL HOSPITAL CARE,LEVL III: ICD-10-PCS | Mod: ,,, | Performed by: SURGERY

## 2019-12-22 PROCEDURE — 82962 GLUCOSE BLOOD TEST: CPT

## 2019-12-22 PROCEDURE — 88305 TISSUE EXAM BY PATHOLOGIST: CPT | Mod: 26,59,, | Performed by: PATHOLOGY

## 2019-12-22 DEVICE — STENT URETERAL VANDER 6FRX75CM: Type: IMPLANTABLE DEVICE | Site: URETER | Status: FUNCTIONAL

## 2019-12-22 RX ORDER — SODIUM CHLORIDE 0.9 % (FLUSH) 0.9 %
10 SYRINGE (ML) INJECTION
Status: DISCONTINUED | OUTPATIENT
Start: 2019-12-22 | End: 2020-01-01 | Stop reason: HOSPADM

## 2019-12-22 RX ORDER — HYDROMORPHONE HYDROCHLORIDE 1 MG/ML
0.5 INJECTION, SOLUTION INTRAMUSCULAR; INTRAVENOUS; SUBCUTANEOUS EVERY 6 HOURS PRN
Status: DISCONTINUED | OUTPATIENT
Start: 2019-12-22 | End: 2019-12-23

## 2019-12-22 RX ORDER — FAMOTIDINE 10 MG/ML
20 INJECTION INTRAVENOUS EVERY 12 HOURS
Status: DISCONTINUED | OUTPATIENT
Start: 2019-12-22 | End: 2019-12-23

## 2019-12-22 RX ORDER — LIDOCAINE HCL/PF 100 MG/5ML
SYRINGE (ML) INTRAVENOUS
Status: DISCONTINUED | OUTPATIENT
Start: 2019-12-22 | End: 2019-12-22

## 2019-12-22 RX ORDER — ALBUMIN HUMAN 50 G/1000ML
SOLUTION INTRAVENOUS CONTINUOUS PRN
Status: DISCONTINUED | OUTPATIENT
Start: 2019-12-22 | End: 2019-12-22

## 2019-12-22 RX ORDER — ONDANSETRON 2 MG/ML
INJECTION INTRAMUSCULAR; INTRAVENOUS
Status: DISCONTINUED | OUTPATIENT
Start: 2019-12-22 | End: 2019-12-22

## 2019-12-22 RX ORDER — PROPOFOL 10 MG/ML
VIAL (ML) INTRAVENOUS
Status: DISCONTINUED | OUTPATIENT
Start: 2019-12-22 | End: 2019-12-22

## 2019-12-22 RX ORDER — MAGNESIUM SULFATE HEPTAHYDRATE 40 MG/ML
4 INJECTION, SOLUTION INTRAVENOUS
Status: DISCONTINUED | OUTPATIENT
Start: 2019-12-22 | End: 2019-12-24

## 2019-12-22 RX ORDER — IPRATROPIUM BROMIDE AND ALBUTEROL SULFATE 2.5; .5 MG/3ML; MG/3ML
3 SOLUTION RESPIRATORY (INHALATION) EVERY 4 HOURS
Status: DISCONTINUED | OUTPATIENT
Start: 2019-12-22 | End: 2019-12-27

## 2019-12-22 RX ORDER — POTASSIUM CHLORIDE 14.9 MG/ML
INJECTION INTRAVENOUS CONTINUOUS PRN
Status: DISCONTINUED | OUTPATIENT
Start: 2019-12-22 | End: 2019-12-22

## 2019-12-22 RX ORDER — SODIUM CHLORIDE 9 MG/ML
INJECTION, SOLUTION INTRAVENOUS CONTINUOUS
Status: DISCONTINUED | OUTPATIENT
Start: 2019-12-22 | End: 2019-12-24

## 2019-12-22 RX ORDER — INDOMETHACIN 25 MG/1
CAPSULE ORAL
Status: DISCONTINUED | OUTPATIENT
Start: 2019-12-22 | End: 2019-12-22

## 2019-12-22 RX ORDER — MORPHINE SULFATE 2 MG/ML
2 INJECTION, SOLUTION INTRAMUSCULAR; INTRAVENOUS EVERY 4 HOURS PRN
Status: DISCONTINUED | OUTPATIENT
Start: 2019-12-22 | End: 2019-12-23

## 2019-12-22 RX ORDER — ONDANSETRON 2 MG/ML
4 INJECTION INTRAMUSCULAR; INTRAVENOUS EVERY 8 HOURS PRN
Status: DISCONTINUED | OUTPATIENT
Start: 2019-12-22 | End: 2019-12-24

## 2019-12-22 RX ORDER — FENTANYL CITRATE 50 UG/ML
INJECTION, SOLUTION INTRAMUSCULAR; INTRAVENOUS
Status: DISCONTINUED | OUTPATIENT
Start: 2019-12-22 | End: 2019-12-22

## 2019-12-22 RX ORDER — IBUPROFEN 200 MG
1 TABLET ORAL DAILY
Status: DISCONTINUED | OUTPATIENT
Start: 2019-12-23 | End: 2020-01-01 | Stop reason: HOSPADM

## 2019-12-22 RX ORDER — POTASSIUM CHLORIDE 7.45 MG/ML
40 INJECTION INTRAVENOUS
Status: DISCONTINUED | OUTPATIENT
Start: 2019-12-22 | End: 2019-12-24

## 2019-12-22 RX ORDER — ROCURONIUM BROMIDE 10 MG/ML
INJECTION, SOLUTION INTRAVENOUS
Status: DISCONTINUED | OUTPATIENT
Start: 2019-12-22 | End: 2019-12-22

## 2019-12-22 RX ORDER — CEFAZOLIN SODIUM 1 G/3ML
INJECTION, POWDER, FOR SOLUTION INTRAMUSCULAR; INTRAVENOUS
Status: DISCONTINUED | OUTPATIENT
Start: 2019-12-22 | End: 2019-12-22

## 2019-12-22 RX ORDER — KETAMINE HCL IN 0.9 % NACL 50 MG/5 ML
SYRINGE (ML) INTRAVENOUS
Status: DISCONTINUED | OUTPATIENT
Start: 2019-12-22 | End: 2019-12-22

## 2019-12-22 RX ORDER — POTASSIUM CHLORIDE 7.45 MG/ML
80 INJECTION INTRAVENOUS
Status: DISCONTINUED | OUTPATIENT
Start: 2019-12-22 | End: 2019-12-24

## 2019-12-22 RX ORDER — ACETAMINOPHEN 650 MG/1
650 SUPPOSITORY RECTAL EVERY 4 HOURS PRN
Status: DISCONTINUED | OUTPATIENT
Start: 2019-12-22 | End: 2019-12-24

## 2019-12-22 RX ORDER — CEFAZOLIN SODIUM 1 G/3ML
2 INJECTION, POWDER, FOR SOLUTION INTRAMUSCULAR; INTRAVENOUS
Status: CANCELLED | OUTPATIENT
Start: 2019-12-22

## 2019-12-22 RX ORDER — DEXAMETHASONE SODIUM PHOSPHATE 4 MG/ML
INJECTION, SOLUTION INTRA-ARTICULAR; INTRALESIONAL; INTRAMUSCULAR; INTRAVENOUS; SOFT TISSUE
Status: DISCONTINUED | OUTPATIENT
Start: 2019-12-22 | End: 2019-12-22

## 2019-12-22 RX ORDER — POTASSIUM CHLORIDE 7.45 MG/ML
60 INJECTION INTRAVENOUS
Status: DISCONTINUED | OUTPATIENT
Start: 2019-12-22 | End: 2019-12-24

## 2019-12-22 RX ORDER — SUCCINYLCHOLINE CHLORIDE 20 MG/ML
INJECTION INTRAMUSCULAR; INTRAVENOUS
Status: DISCONTINUED | OUTPATIENT
Start: 2019-12-22 | End: 2019-12-22

## 2019-12-22 RX ORDER — HYDROMORPHONE HYDROCHLORIDE 1 MG/ML
1 INJECTION, SOLUTION INTRAMUSCULAR; INTRAVENOUS; SUBCUTANEOUS
Status: COMPLETED | OUTPATIENT
Start: 2019-12-22 | End: 2019-12-22

## 2019-12-22 RX ORDER — MORPHINE SULFATE 4 MG/ML
4 INJECTION, SOLUTION INTRAMUSCULAR; INTRAVENOUS
Status: COMPLETED | OUTPATIENT
Start: 2019-12-22 | End: 2019-12-22

## 2019-12-22 RX ORDER — ACETAMINOPHEN 10 MG/ML
INJECTION, SOLUTION INTRAVENOUS
Status: DISCONTINUED | OUTPATIENT
Start: 2019-12-22 | End: 2019-12-22

## 2019-12-22 RX ADMIN — PIPERACILLIN AND TAZOBACTAM 4.5 G: 4; .5 INJECTION, POWDER, FOR SOLUTION INTRAVENOUS at 07:12

## 2019-12-22 RX ADMIN — POTASSIUM CHLORIDE: 14.9 INJECTION, SOLUTION INTRAVENOUS at 03:12

## 2019-12-22 RX ADMIN — SODIUM CHLORIDE, SODIUM GLUCONATE, SODIUM ACETATE, POTASSIUM CHLORIDE, MAGNESIUM CHLORIDE, SODIUM PHOSPHATE, DIBASIC, AND POTASSIUM PHOSPHATE: .53; .5; .37; .037; .03; .012; .00082 INJECTION, SOLUTION INTRAVENOUS at 04:12

## 2019-12-22 RX ADMIN — FENTANYL CITRATE 50 MCG: 50 INJECTION, SOLUTION INTRAMUSCULAR; INTRAVENOUS at 03:12

## 2019-12-22 RX ADMIN — CALCIUM CHLORIDE 200 MG: 100 INJECTION, SOLUTION INTRAVENOUS at 02:12

## 2019-12-22 RX ADMIN — ROCURONIUM BROMIDE 20 MG: 10 INJECTION, SOLUTION INTRAVENOUS at 03:12

## 2019-12-22 RX ADMIN — ROCURONIUM BROMIDE 50 MG: 10 INJECTION, SOLUTION INTRAVENOUS at 01:12

## 2019-12-22 RX ADMIN — SUGAMMADEX 200 MG: 100 INJECTION, SOLUTION INTRAVENOUS at 05:12

## 2019-12-22 RX ADMIN — HYDROMORPHONE HYDROCHLORIDE 0.5 MG: 1 INJECTION, SOLUTION INTRAMUSCULAR; INTRAVENOUS; SUBCUTANEOUS at 07:12

## 2019-12-22 RX ADMIN — MORPHINE SULFATE 4 MG: 4 INJECTION, SOLUTION INTRAMUSCULAR; INTRAVENOUS at 10:12

## 2019-12-22 RX ADMIN — CALCIUM CHLORIDE 200 MG: 100 INJECTION, SOLUTION INTRAVENOUS at 03:12

## 2019-12-22 RX ADMIN — LIDOCAINE HYDROCHLORIDE 100 MG: 20 INJECTION, SOLUTION INTRAVENOUS at 01:12

## 2019-12-22 RX ADMIN — ACETAMINOPHEN 1000 MG: 10 INJECTION, SOLUTION INTRAVENOUS at 03:12

## 2019-12-22 RX ADMIN — SODIUM CHLORIDE, SODIUM GLUCONATE, SODIUM ACETATE, POTASSIUM CHLORIDE, MAGNESIUM CHLORIDE, SODIUM PHOSPHATE, DIBASIC, AND POTASSIUM PHOSPHATE: .53; .5; .37; .037; .03; .012; .00082 INJECTION, SOLUTION INTRAVENOUS at 02:12

## 2019-12-22 RX ADMIN — SODIUM CHLORIDE: 0.9 INJECTION, SOLUTION INTRAVENOUS at 12:12

## 2019-12-22 RX ADMIN — FAMOTIDINE 20 MG: 10 INJECTION, SOLUTION INTRAVENOUS at 09:12

## 2019-12-22 RX ADMIN — SODIUM BICARBONATE 50 MEQ: 84 INJECTION, SOLUTION INTRAVENOUS at 02:12

## 2019-12-22 RX ADMIN — MAGNESIUM SULFATE IN WATER 2000 MG: 40 INJECTION, SOLUTION INTRAVENOUS at 01:12

## 2019-12-22 RX ADMIN — CEFAZOLIN 2 G: 330 INJECTION, POWDER, FOR SOLUTION INTRAMUSCULAR; INTRAVENOUS at 05:12

## 2019-12-22 RX ADMIN — SUCCINYLCHOLINE CHLORIDE 160 MG: 20 INJECTION, SOLUTION INTRAMUSCULAR; INTRAVENOUS at 01:12

## 2019-12-22 RX ADMIN — ROCURONIUM BROMIDE 10 MG: 10 INJECTION, SOLUTION INTRAVENOUS at 04:12

## 2019-12-22 RX ADMIN — PROPOFOL 100 MG: 10 INJECTION, EMULSION INTRAVENOUS at 01:12

## 2019-12-22 RX ADMIN — DEXAMETHASONE SODIUM PHOSPHATE 8 MG: 4 INJECTION, SOLUTION INTRAMUSCULAR; INTRAVENOUS at 03:12

## 2019-12-22 RX ADMIN — SODIUM CHLORIDE, SODIUM GLUCONATE, SODIUM ACETATE, POTASSIUM CHLORIDE, MAGNESIUM CHLORIDE, SODIUM PHOSPHATE, DIBASIC, AND POTASSIUM PHOSPHATE: .53; .5; .37; .037; .03; .012; .00082 INJECTION, SOLUTION INTRAVENOUS at 03:12

## 2019-12-22 RX ADMIN — ONDANSETRON 4 MG: 2 INJECTION INTRAMUSCULAR; INTRAVENOUS at 03:12

## 2019-12-22 RX ADMIN — SODIUM BICARBONATE 50 MEQ: 84 INJECTION, SOLUTION INTRAVENOUS at 03:12

## 2019-12-22 RX ADMIN — FENTANYL CITRATE 50 MCG: 50 INJECTION, SOLUTION INTRAMUSCULAR; INTRAVENOUS at 02:12

## 2019-12-22 RX ADMIN — VASOPRESSIN 2 UNITS: 20 INJECTION INTRAVENOUS at 01:12

## 2019-12-22 RX ADMIN — SODIUM CHLORIDE, SODIUM GLUCONATE, SODIUM ACETATE, POTASSIUM CHLORIDE, MAGNESIUM CHLORIDE, SODIUM PHOSPHATE, DIBASIC, AND POTASSIUM PHOSPHATE: .53; .5; .37; .037; .03; .012; .00082 INJECTION, SOLUTION INTRAVENOUS at 01:12

## 2019-12-22 RX ADMIN — ALBUMIN (HUMAN): 12.5 SOLUTION INTRAVENOUS at 01:12

## 2019-12-22 RX ADMIN — FENTANYL CITRATE 50 MCG: 50 INJECTION, SOLUTION INTRAMUSCULAR; INTRAVENOUS at 05:12

## 2019-12-22 RX ADMIN — CALCIUM CHLORIDE 200 MG: 100 INJECTION, SOLUTION INTRAVENOUS at 04:12

## 2019-12-22 RX ADMIN — CALCIUM CHLORIDE 300 MG: 100 INJECTION, SOLUTION INTRAVENOUS at 02:12

## 2019-12-22 RX ADMIN — SODIUM CHLORIDE, SODIUM GLUCONATE, SODIUM ACETATE, POTASSIUM CHLORIDE, MAGNESIUM CHLORIDE, SODIUM PHOSPHATE, DIBASIC, AND POTASSIUM PHOSPHATE: .53; .5; .37; .037; .03; .012; .00082 INJECTION, SOLUTION INTRAVENOUS at 05:12

## 2019-12-22 RX ADMIN — FENTANYL CITRATE 50 MCG: 50 INJECTION, SOLUTION INTRAMUSCULAR; INTRAVENOUS at 04:12

## 2019-12-22 RX ADMIN — Medication 25 MG: at 01:12

## 2019-12-22 RX ADMIN — VASOPRESSIN 1 UNITS: 20 INJECTION INTRAVENOUS at 02:12

## 2019-12-22 RX ADMIN — MORPHINE SULFATE 2 MG: 2 INJECTION, SOLUTION INTRAMUSCULAR; INTRAVENOUS at 09:12

## 2019-12-22 RX ADMIN — VASOPRESSIN 1 UNITS: 20 INJECTION INTRAVENOUS at 01:12

## 2019-12-22 RX ADMIN — CALCIUM CHLORIDE 300 MG: 100 INJECTION, SOLUTION INTRAVENOUS at 03:12

## 2019-12-22 RX ADMIN — SODIUM CHLORIDE, POTASSIUM CHLORIDE, SODIUM LACTATE AND CALCIUM CHLORIDE 1000 ML: 600; 310; 30; 20 INJECTION, SOLUTION INTRAVENOUS at 10:12

## 2019-12-22 RX ADMIN — Medication 10 MG: at 03:12

## 2019-12-22 RX ADMIN — ROCURONIUM BROMIDE 20 MG: 10 INJECTION, SOLUTION INTRAVENOUS at 02:12

## 2019-12-22 RX ADMIN — IPRATROPIUM BROMIDE AND ALBUTEROL SULFATE 3 ML: .5; 3 SOLUTION RESPIRATORY (INHALATION) at 08:12

## 2019-12-22 RX ADMIN — HYDROMORPHONE HYDROCHLORIDE 1 MG: 1 INJECTION, SOLUTION INTRAMUSCULAR; INTRAVENOUS; SUBCUTANEOUS at 11:12

## 2019-12-22 RX ADMIN — VASOPRESSIN 1 UNITS: 20 INJECTION INTRAVENOUS at 03:12

## 2019-12-22 RX ADMIN — CEFAZOLIN 2 G: 330 INJECTION, POWDER, FOR SOLUTION INTRAMUSCULAR; INTRAVENOUS at 01:12

## 2019-12-22 RX ADMIN — ALBUMIN (HUMAN): 12.5 SOLUTION INTRAVENOUS at 02:12

## 2019-12-22 RX ADMIN — CALCIUM CHLORIDE 300 MG: 100 INJECTION, SOLUTION INTRAVENOUS at 04:12

## 2019-12-22 NOTE — OP NOTE
[facility]  Operative Note    Date: 12/22/2019    Pre-Op Diagnosis: internal hernia associate with ileal conduit, Intraoperative left ureteral injury      Post-Op Diagnosis: same    Procedure(s) Performed:   Left ureteral reimplant into ileal conduit    Specimen(s):   Left distal ureter    Staff Surgeon: Manish Styles MD    Assistant Surgeon: Leobardo Barrett MD; Elias Dejesus MD    Anesthesia:  General endotracheal anesthesia    Indications: Kiko Gruber is a 54 y.o. male who, per chart review, has history of cystectomy with ileal conduit for unknown reasons, history of metastatic prostate cancer s/p bilateral orchiectomy, who presented today with small bowel obstruction secondary to internal hernia associated with the ileal conduit.  Urology was consulted intraoperatively due to left ureteral transection near the conduit.      Findings:    - left ureter had been transected sharply approximately 1.5 cm proximal to the conduit  - left ureter reimplanted via refluxing anastomosis to butt end of conduit  - stump of left ureter that remained attached to the conduit was excised sharply, and the defect in the conduit closed in two layers with absorbable suture  - right ureter inspected and uninjured       Estimated Blood Loss: per general surgery    Drains:   - 18 fr red rubber catheter in conduit  - single J left ureteral stent  - Ronald drain placed by general surgery prior to closure     Procedure in detail:  The patient was already intubated and sedated and the abdomen had been opened prior to consultation.  General surgery had already performed a small bowel resection, but due to the dense adhesions between the internal hernia, ileal conduit, and left ureter, the left ureter had to be transected sharply in order to reduce the hernia and excise the non-viable bowel.      On arrival in the OR, the conduit was inspected.  There was no overt injury to the ileal conduit.  The right ureter was  identified.  This was uninjured.  The left ureter had been transected approximately 1.5 cm proximal to the ilioureteral anastomosis.  It was confirmed with Dr. Herron that this had been done sharply.      The left ureteral stump was excised from the conduit and the defect was repaired in two layers with vicryl suture. The left ureter was tagged, then spatulated 1.5 cm.  A small hole was created in the butt end of the ileal conduit about 1 cm away from the original left ilioureteral anastomosis.  The ureter was then reimplanted into the conduit using two running 4-0 monocryl sutures.  Prior to completing the anastomosis, a single J ureteral stent was inserted through the conduit and into the left ureter to the expected level of the renal pelvis. The anastomosis was then completed.  The conduit was leak tested, and no leak was identified.  A red rubber catheter was inserted into the urostomy and left in place in the conduit.  A Ronald drain will be placed by general surgery prior to closure.      The patient tolerated the procedure well and was transferred to the PACU in stable condition.      Disposition:  The patient will be admitted to the SICU per general surgery.  The red rubber catheter will remain in the stoma for three weeks.  The stent will remain in place for 6 weeks.  The drain will remain in place until the output is < 100 cc per day, and the patient is at least 1 week post op.     Leobardo Barrett MD

## 2019-12-22 NOTE — SUBJECTIVE & OBJECTIVE
No current facility-administered medications on file prior to encounter.      No current outpatient medications on file prior to encounter.       Review of patient's allergies indicates:  No Known Allergies    History reviewed. No pertinent past medical history.  History reviewed. No pertinent surgical history.  Family History     None        Tobacco Use    Smoking status: Not on file   Substance and Sexual Activity    Alcohol use: Not on file    Drug use: Not on file    Sexual activity: Not on file     Review of Systems   Constitutional: Negative for chills and fever.   HENT: Negative for trouble swallowing.    Eyes: Negative for visual disturbance.   Respiratory: Negative for shortness of breath.    Cardiovascular: Negative for chest pain.   Gastrointestinal: Positive for abdominal pain, diarrhea, nausea and vomiting.   Genitourinary: Positive for difficulty urinating (urostomy).   Neurological: Negative for seizures.     Objective:     Vital Signs (Most Recent):  Temp: 100.1 °F (37.8 °C) (12/22/19 0951)  Pulse: (!) 125 (12/22/19 1201)  Resp: 20 (12/22/19 0951)  BP: 129/78 (12/22/19 1201)  SpO2: 96 % (12/22/19 1209) Vital Signs (24h Range):  Temp:  [97.6 °F (36.4 °C)-100.1 °F (37.8 °C)] 100.1 °F (37.8 °C)  Pulse:  [100-126] 125  Resp:  [18-20] 20  SpO2:  [95 %-96 %] 96 %  BP: (115-165)/(76-97) 129/78     Weight: 78.5 kg (172 lb 15.9 oz)  Body mass index is 25.55 kg/m².    Physical Exam   Constitutional: He appears well-developed.   HENT:   Head: Normocephalic.   Eyes: Conjunctivae are normal.   Cardiovascular: Regular rhythm. Tachycardia present.   Pulmonary/Chest: Effort normal.   Abdominal: Soft. He exhibits no distension. There is tenderness. There is no guarding.   Urostomy in RLQ  Multiple scars in lower abdomen   Neurological: He is alert.   Skin: Skin is warm.       Significant Labs:  CBC:   Recent Labs   Lab 12/22/19  1008   WBC 49.42*   RBC 4.70   HGB 14.3   HCT 43.0      MCV 92   MCH 30.4    MCHC 33.3     CMP:   Recent Labs   Lab 12/22/19  1008   *   CALCIUM 8.5*   ALBUMIN 3.1*   PROT 6.4   *   K 4.6   CO2 21*   CL 95   BUN 11   CREATININE 1.6*   ALKPHOS 136*   ALT 13   AST 14   BILITOT 0.3       Significant Diagnostics:  CT reviewed with radiology, concern for closed loop obstruction in RLQ with possible internal hernia involving urostomy. Small amt free fluid. Hydronephrosis of L kidney with extensive edema and fat stranding.

## 2019-12-22 NOTE — H&P
See consult note 12/22/19.    Carmelo Burleson MD  Surgery Resident, PGY-4  Pager [phone]  12/22/2019

## 2019-12-22 NOTE — ED TRIAGE NOTES
Patient reports to the ED today as a Transfer from [facility] with reports of Small Bowel Obstruction. Per staff patient presented with L side abdominal pain. Hx of prostate cancer with metastasis. Patient present with urostomy. Patient given a total of 3 of Dilaudid at other facility for pain control. Patient received 12.5 phenergan, 1g of Mag, 8 of Zofran, 650 of Tylenol and 4.5 of Zosyn. Patient presents with 14fr NG tube to JOYCE andrew

## 2019-12-22 NOTE — ANESTHESIA PROCEDURE NOTES
Intubation  Performed by: Ursula Valladares CRNA  Authorized by: Shira Avila MD     Intubation:     Induction:  Rapid sequence induction    Intubated:  Postinduction    Mask Ventilation:  N/a    Attempts:  1    Attempted By:  CRNA    Blade:  Garcia 2    Laryngeal View Grade: Grade IIA - cords partially seen      Difficult Airway Encountered?: No      Complications:  None    Airway Device Size:  7.5    Style/Cuff Inflation:  Cuffed    Inflation Amount (mL):  6    Tube secured:  22    Secured at:  The lips    Placement Verified By:  Capnometry    Complicating Factors:  None    Findings Post-Intubation:  BS equal bilateral and atraumatic/condition of teeth unchanged

## 2019-12-22 NOTE — ED NOTES
LOC: Patient name and date of birth verified. The patient is awake, alert and aware of environment with an appropriate affect, the patient is oriented x 3 and speaking appropriately.   APPEARANCE: Patient resting comfortably, patient is clean and well groomed, patient's clothing is properly fastened.  SKIN: The skin is warm and dry, color consistent with ethnicity, patient has normal skin turgor and moist mucus membranes, skin intact, no breakdown or bruising noted.  MUSCULOSKELETAL: Patient moving all extremities well, no obvious swelling or deformities noted.   RESPIRATORY: Respirations are spontaneous, patient has a normal effort and rate, no accessory muscle use noted.  CARDIAC: Patient has a normal rate and rhythm, no periphreal edema noted, capillary refill < 3 seconds.  ABDOMEN: Soft and non tender to palpation, no distention noted. Bowel sounds present in all four quadrants. NG present at this time hooked to intermttent suction.  NEUROLOGIC: Eyes open spontaneously, behavior appropriate to situation, follows commands, facial expression symmetrical, bilateral hand grasp equal and even, purposeful motor response noted, normal sensation in all extremities when touched with a finger.

## 2019-12-22 NOTE — HPI
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 Also on prednisone as part of chemo. Getting Neupogen.  Pt states he ate last night around 10p and just after that noted severe abdominal pain and nausea- had multiple episodes of emesis NBNB. Tried his anti-nausea medication but did not help so he went to ED for evaluation. Last BM was yesterday. No fevers at home. Pain has not improved. At OSH had CTabd/pelvis which revealed high grade sbo concerning for internal hernia with closed loop as well as moderate left hydronephrosis and significant perinephric edema with focal narrowing of L ureter as it enters possible internal hernia. WBC at OSH 35.5 and sodium 125 lactate 2.3.

## 2019-12-22 NOTE — ANESTHESIA PROCEDURE NOTES
Arterial    Diagnosis: Abdominal pain (R10.9)    Patient location during procedure: done in OR  Procedure start time: 12/22/2019 1:36 PM  Timeout: 12/22/2019 1:35 PM  Procedure end time: 12/22/2019 2:02 PM    Staffing  Authorizing Provider: Shira Avila MD  Performing Provider: hSira Avila MD    Anesthesiologist was present at the time of the procedure.    Preanesthetic Checklist  Completed: patient identified, site marked, surgical consent, pre-op evaluation, timeout performed, IV checked, risks and benefits discussed, monitors and equipment checked and anesthesia consent givenArterial  Skin Prep: chlorhexidine gluconate  Local Infiltration: none  Orientation: left  Location: radial  Catheter Size: 20 G  Catheter placement by Anatomical landmarks. Heme positive aspiration all ports.Insertion Attempts: 1  Assessment  Dressing: secured with tape and tegaderm  Patient: Tolerated well

## 2019-12-22 NOTE — TRANSFER OF CARE
"Anesthesia Transfer of Care Note    Patient: Kiko Gruber    Procedure(s) Performed: Procedure(s) (LRB):  LAPAROTOMY, EXPLORATORY with  Small bowel resection and Ileostomy creation. (N/A)  URETERONEOCYSTOSTOMY reimplant to conduit (Left)    Patient location: ICU (10th flr)    Anesthesia Type: general    Transport from OR: Transported from OR on 6-10 L/min O2 by face mask with adequate spontaneous ventilation. Continuous ECG monitoring in transport. Continuous SpO2 monitoring in transport. Continuos invasive BP monitoring in transport    Post pain: adequate analgesia    Post assessment: no apparent anesthetic complications and tolerated procedure well    Post vital signs: stable    Level of consciousness: awake and alert    Nausea/Vomiting: no nausea/vomiting    Complications: none    Transfer of care protocol was followed      Last vitals:   Visit Vitals  BP 90/60   Pulse (!) 113   Temp 36.5 °C (97.7 °F) (Oral)   Resp 18   Ht 5' 9" (1.753 m)   Wt 78.5 kg (172 lb 15.9 oz)   SpO2 (!) 90%   BMI 25.55 kg/m²     "

## 2019-12-22 NOTE — ANESTHESIA PREPROCEDURE EVALUATION
[facility]  Anesthesia Pre-Operative Evaluation         Patient Name: Kiko Gruber  YOB: 1965  MRN: [id number]    SUBJECTIVE:     Pre-operative evaluation for Procedure(s) (LRB):  LAPAROTOMY, EXPLORATORY possible bowel resection (N/A)     12/22/2019    Kiko Gruber is a 54 y.o. 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 Presented with severe abdominal pain starting last night. Imaging consistent with SBO as well as moderate left hydronephrosis and significant perinephric edema with focal narrowing of lt ureter as it enters possible internal hernia. Patient taking prednisone since last chemo treatment (12/11), dose unknown.    Patient now presents for the above procedure(s).      LDA:        Peripheral IV - Single Lumen 12/22/19 1002 18 G Left Hand (Active)   Site Assessment Clean;Dry;Intact;No redness;No swelling 12/22/2019 10:02 AM   Line Status Blood return noted;Flushed;Saline locked 12/22/2019 10:02 AM   Dressing Status Clean;Dry;Intact 12/22/2019 10:02 AM   Number of days: 0            Peripheral IV - Single Lumen 12/22/19 1002 18 G Right Antecubital (Active)   Site Assessment Clean;Dry;Intact;No redness;No swelling 12/22/2019 10:03 AM   Line Status Blood return noted;Flushed;Saline locked 12/22/2019 10:03 AM   Dressing Status Clean;Dry;Intact 12/22/2019 10:03 AM   Number of days: 0            NG/OG Tube 12/22/19 1004 nasogastric 14 Fr. Right nostril (Active)   Number of days: 0       Prev airway: None documented.    Drips: None documented.      Patient Active Problem List   Diagnosis    Abdominal pain       Review of patient's allergies indicates:  No Known Allergies    Current Inpatient Medications:   lactated ringers  1,000 mL Intravenous ED 1 Time       No current  facility-administered medications on file prior to encounter.      No current outpatient medications on file prior to encounter.       No past surgical history on file.    Social History     Socioeconomic History    Marital status:      Spouse name: Not on file    Number of children: Not on file    Years of education: Not on file    Highest education level: Not on file   Occupational History    Not on file   Social Needs    Financial resource strain: Not on file    Food insecurity:     Worry: Not on file     Inability: Not on file    Transportation needs:     Medical: Not on file     Non-medical: Not on file   Tobacco Use    Smoking status: Not on file   Substance and Sexual Activity    Alcohol use: Not on file    Drug use: Not on file    Sexual activity: Not on file   Lifestyle    Physical activity:     Days per week: Not on file     Minutes per session: Not on file    Stress: Not on file   Relationships    Social connections:     Talks on phone: Not on file     Gets together: Not on file     Attends Zoroastrianism service: Not on file     Active member of club or organization: Not on file     Attends meetings of clubs or organizations: Not on file     Relationship status: Not on file   Other Topics Concern    Not on file   Social History Narrative    Not on file       OBJECTIVE:     Vital Signs Range (Last 24H):  Temp:  [36.4 °C (97.6 °F)-37.8 °C (100.1 °F)]   Pulse:  [100-126]   Resp:  [18-20]   BP: (115-165)/(76-97)   SpO2:  [95 %-96 %]       Significant Labs:  Lab Results   Component Value Date    WBC 49.42 (H) 12/22/2019    HGB 14.3 12/22/2019    HCT 43.0 12/22/2019     12/22/2019    ALT 13 12/22/2019    AST 14 12/22/2019     (L) 12/22/2019    K 4.6 12/22/2019    CL 95 12/22/2019    CREATININE 1.6 (H) 12/22/2019    BUN 11 12/22/2019    CO2 21 (L) 12/22/2019    INR 1.0 12/22/2019       Diagnostic Studies: No relevant studies.    EKG: No results found for this or any previous  visit.    ECHOCARDIOGRAM:  TTE:  No results found for this or any previous visit.      ASSESSMENT/PLAN:         Anesthesia Evaluation    I have reviewed the Patient Summary Reports.    I have reviewed the Nursing Notes.   I have reviewed the Medications.     Review of Systems  Anesthesia Hx:  No problems with previous Anesthesia  History of prior surgery of interest to airway management or planning: Denies Family Hx of Anesthesia complications.   Denies Personal Hx of Anesthesia complications.   Social:  Smoker    Hematology/Oncology:        Current/Recent Cancer. chemotherapy   Cardiovascular:   Hypertension    Pulmonary:   COPD    Renal/:   Chronic Renal Disease, ARF    Hepatic/GI:   SBO       Physical Exam  General:  Well nourished, Obesity    Airway/Jaw/Neck:  Airway Findings: Mouth Opening: Normal Tongue: Normal  General Airway Assessment: Adult, Good  Mallampati: II  TM Distance: Normal, at least 6 cm  Jaw/Neck Findings:  Neck ROM: Normal ROM       Chest/Lungs:  Chest/Lungs Findings: Clear to auscultation     Heart/Vascular:  Heart Findings: Rate: Normal  Rhythm: Regular Rhythm        Mental Status:  Mental Status Findings:  Alert and Oriented, Cooperative         Anesthesia Plan  Type of Anesthesia, risks & benefits discussed:  Anesthesia Type:  general  Patient's Preference:   Intra-op Monitoring Plan: standard ASA monitors and arterial line  Intra-op Monitoring Plan Comments:   Post Op Pain Control Plan: per primary service following discharge from PACU  Post Op Pain Control Plan Comments:   Induction:   IV and rapid sequence  Beta Blocker:         Informed Consent: Patient understands risks and agrees with Anesthesia plan.  Questions answered. Anesthesia consent signed with patient.  ASA Score: 3  emergent   Day of Surgery Review of History & Physical:    H&P update referred to the surgeon.         Ready For Surgery From Anesthesia Perspective.

## 2019-12-22 NOTE — CONSULTS
[facility]  General Surgery  Consult Note    Patient Name: Kiko Gruber  MRN: [id number]  Code Status: Full Code  Admission Date: 12/22/2019  Hospital Length of Stay: 0 days  Attending Physician: Antonella Strauss MD  Primary Care Provider: No primary care provider on file.    Patient information was obtained from patient, relative(s) and ER records.     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 Also on prednisone as part of chemo. Getting Neupogen.  Pt states he ate last night around 10p and just after that noted severe abdominal pain and nausea- had multiple episodes of emesis NBNB. Tried his anti-nausea medication but did not help so he went to ED for evaluation. Last BM was yesterday. No fevers at home. Pain has not improved. At OSH had CTabd/pelvis which revealed high grade sbo concerning for internal hernia with closed loop as well as moderate left hydronephrosis and significant perinephric edema with focal narrowing of L ureter as it enters possible internal hernia. WBC at OSH 35.5 and sodium 125 lactate 2.3. Pt is a 2-3 ppd smoker and drinks 2-3 beers daily.    No current facility-administered medications on file prior to encounter.      No current outpatient medications on file prior to encounter.       Review of patient's allergies indicates:  No Known Allergies    History reviewed. No pertinent past medical history.  History reviewed. No pertinent surgical history.  Family History     None        Tobacco Use    Smoking status: Not on file   Substance and  Sexual Activity    Alcohol use: Not on file    Drug use: Not on file    Sexual activity: Not on file     Review of Systems   Constitutional: Negative for chills and fever.   HENT: Negative for trouble swallowing.    Eyes: Negative for visual disturbance.   Respiratory: Negative for shortness of breath.    Cardiovascular: Negative for chest pain.   Gastrointestinal: Positive for abdominal pain, diarrhea, nausea and vomiting.   Genitourinary: Positive for difficulty urinating (urostomy).   Neurological: Negative for seizures.     Objective:     Vital Signs (Most Recent):  Temp: 100.1 °F (37.8 °C) (12/22/19 0951)  Pulse: (!) 125 (12/22/19 1201)  Resp: 20 (12/22/19 0951)  BP: 129/78 (12/22/19 1201)  SpO2: 96 % (12/22/19 1209) Vital Signs (24h Range):  Temp:  [97.6 °F (36.4 °C)-100.1 °F (37.8 °C)] 100.1 °F (37.8 °C)  Pulse:  [100-126] 125  Resp:  [18-20] 20  SpO2:  [95 %-96 %] 96 %  BP: (115-165)/(76-97) 129/78     Weight: 78.5 kg (172 lb 15.9 oz)  Body mass index is 25.55 kg/m².    Physical Exam   Constitutional: He appears well-developed.   HENT:   Head: Normocephalic.   Eyes: Conjunctivae are normal.   Cardiovascular: Regular rhythm. Tachycardia present.   Pulmonary/Chest: Effort normal.   Abdominal: Soft. He exhibits no distension. There is tenderness. There is no guarding.   Urostomy in RLQ  Multiple scars in lower abdomen   Neurological: He is alert.   Skin: Skin is warm.       Significant Labs:  CBC:   Recent Labs   Lab 12/22/19  1008   WBC 49.42*   RBC 4.70   HGB 14.3   HCT 43.0      MCV 92   MCH 30.4   MCHC 33.3     CMP:   Recent Labs   Lab 12/22/19  1008   *   CALCIUM 8.5*   ALBUMIN 3.1*   PROT 6.4   *   K 4.6   CO2 21*   CL 95   BUN 11   CREATININE 1.6*   ALKPHOS 136*   ALT 13   AST 14   BILITOT 0.3       Significant Diagnostics:  CT reviewed with radiology, concern for closed loop obstruction in RLQ with possible internal hernia involving urostomy. Small amt free fluid. Hydronephrosis  of L kidney with extensive edema and fat stranding.    Assessment/Plan:     Abdominal pain  53yo M with metastatic prostate cancer s/p radical cystectomy with urostomy on chemo with concern for closed loop obstruction.     - Admit to surgery  - Plan for OR ex lap  - Consent obtained  - IVF  - Type and cross  - NGT to LIWS    Discussed with patient and 2 sisters the risk of surgery while on chemotherapy including but no limited to delayed wound healing, anastomotic leak if bowel resection, and infection, they understand and want to precede with surgery.       VTE Risk Mitigation (From admission, onward)         Ordered     IP VTE HIGH RISK PATIENT  Once      12/22/19 1222     Place sequential compression device  Until discontinued      12/22/19 1222                Thank you for your consult. I will follow-up with patient. Please contact us if you have any additional questions.    Carmelo Burleson MD  General Surgery  [facility]

## 2019-12-22 NOTE — ASSESSMENT & PLAN NOTE
55yo M with metastatic prostate cancer s/p radical cystectomy with urostomy on chemo with concern for closed loop obstruction.     - Admit to surgery  - Plan for OR ex lap  - Consent obtained  - IVF  - Type and cross  - NGT to LIWS    Discussed with patient and 2 sisters the risk of surgery while on chemotherapy including but no limited to delayed wound healing, anastomotic leak if bowel resection, and infection, they understand and want to precede with surgery.

## 2019-12-22 NOTE — ED PROVIDER NOTES
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 Pt states he ate last night around 10p and just after that noted severe abdominal pain in lower abdomen mostly on the left and nausea- had multiple episodes of emesis- non-bloody. Tried his anti-nausea medication but did not help so he went to ED for evaluation. Last BM was yesterday. No fevers at home  At OSH had CTabd/pelvis which revealed high grade sbo concerning for internal hernia as well as moderate left hydronephrosis and significant perinephric edema with focal narrowing of lt ureter as it enters possible internal hernia.  WBC at OSH 35.5 and sodium 125 lactate 2.3  At OSH NGT was placed- 550cc. Pt had a temp of 100.1 and was given pip-chris at 8:09 this morning.   Pt and family do not think pt has recently been treated with neupogen or other medication for neurtropenia        Review of patient's allergies indicates:  No Known Allergies  History reviewed. No pertinent past medical history.  History reviewed. No pertinent surgical history.  History reviewed. No pertinent family history.  Social History     Tobacco Use    Smoking status: Not on file   Substance Use Topics    Alcohol use: Not on file    Drug use: Not on file     Review of Systems   Constitutional: Positive for fever.   HENT: Positive for sore throat.    Respiratory: Negative for cough and shortness of breath.    Cardiovascular: Negative for chest pain and leg swelling.   Gastrointestinal: Positive for abdominal distention, abdominal pain, diarrhea, nausea and vomiting.   Genitourinary:        Urostomy with good UOP    Musculoskeletal: Negative for back pain.   Skin: Negative for rash.   Allergic/Immunologic: Positive for immunocompromised state.   Neurological: Negative for dizziness and light-headedness.       Physical Exam     Initial Vitals [12/22/19 0951]   BP Pulse Resp Temp SpO2   122/76 (!) 120 20 100.1 °F (37.8 °C) 96 %      MAP       --         Physical Exam    Nursing note and vitals reviewed.  Constitutional: He appears well-developed and well-nourished. No distress.   HENT:   Head: Normocephalic and atraumatic.   NGT   Eyes: EOM are normal. Pupils are equal, round, and reactive to light.   Neck: Normal range of motion. Neck supple. No JVD present.   Cardiovascular: Intact distal pulses.   Tachycardic, rr, no murmur   Pulmonary/Chest: No respiratory distress. He has no wheezes. He has no rhonchi. He has no rales.   Lt CW port   Abdominal:   Softly distended, urostomy pink with urine in bag, diffuse ttp, voluntary guarding   Musculoskeletal: He exhibits no tenderness.   Minimal le edema   Neurological: He is alert and oriented to person, place, and time. GCS score is 15. GCS eye subscore is 4. GCS verbal subscore is 5. GCS motor subscore is 6.   Skin: Skin is warm and dry.         ED Course   Procedures  Labs Reviewed   CBC W/ AUTO DIFFERENTIAL - Abnormal; Notable for the following components:       Result Value    WBC 49.42 (*)     MPV 8.8 (*)     Gran% 83.0 (*)     Lymph% 4.0 (*)     Mono% 2.0 (*)     All other components within normal limits   COMPREHENSIVE METABOLIC PANEL - Abnormal; Notable for the following components:    Sodium 127 (*)     CO2 21 (*)     Glucose 121 (*)     Creatinine 1.6 (*)     Calcium 8.5 (*)     Albumin 3.1 (*)     Alkaline Phosphatase 136 (*)     eGFR if  55.2 (*)     eGFR if non  47.8 (*)     All other components within normal limits   URINALYSIS, REFLEX TO URINE CULTURE - Abnormal; Notable for the following components:    Protein, UA 2+ (*)     Occult  Blood UA 1+ (*)     Leukocytes, UA Trace (*)     All other components within normal limits    Narrative:     Preferred Collection Type->Urine, Clean Catch   MAGNESIUM - Abnormal; Notable for the following components:    Magnesium 1.1 (*)     All other components within normal limits   URINALYSIS MICROSCOPIC - Abnormal; Notable for the following components:    Bacteria Many (*)     All other components within normal limits    Narrative:     Preferred Collection Type->Urine, Clean Catch   CULTURE, BLOOD   CULTURE, BLOOD   LACTIC ACID, PLASMA   PROTIME-INR   LIPASE   POCT GLUCOSE MONITORING CONTINUOUS     EKG Readings: (Independently Interpreted)   Sinus tachycardia rate approx 150, no evidence of acute ischemia         X-Rays:   Independently Interpreted Readings:   Abdomen: Dilated loops of bowel, no free air     Medical Decision Making:   History:   Old Medical Records: I decided to obtain old medical records.  Initial Assessment:   53 y/o M with high grade sbo possible internal hernia  Possible necrotic bowel or perforation  NPO  Abd XR ordered  Will review CT abd/pel from OSH  Routine blood work ordered as well as lactate and blood cultures  Consult gen surgery  Likely urology consult as well  Independently Interpreted Test(s):   I have ordered and independently interpreted X-rays - see prior notes.  I have ordered and independently interpreted EKG Reading(s) - see prior notes  Clinical Tests:   Lab Tests: Ordered  Radiological Study: Ordered  Medical Tests: Ordered  ED Management:  12:12 PM  Pt admitted to gen surg  Other:   I have discussed this case with another health care provider.       <> Summary of the Discussion: 10:18 AM discussed with general surgery.           Clinical Impression:       ICD-10-CM ICD-9-CM   1. SBO (small bowel obstruction) K56.609 560.9   2. Abdominal pain R10.9 789.00         Disposition:   Disposition: Admitted  Condition: Serious                     Antonella Strauss MD  12/22/19  1213

## 2019-12-22 NOTE — PROGRESS NOTES
Report received from Emergency Room RN. Pt transported to SICU [address] with portable telemetry and nasal cannula. Pt connected to ICU monitor and wall O2. SICU team called and made aware of patient arrival. Pt then transferred to OR with portable monitor and 2L nasal cannula.

## 2019-12-23 PROBLEM — K56.609 SBO (SMALL BOWEL OBSTRUCTION): Status: ACTIVE | Noted: 2019-12-23

## 2019-12-23 PROBLEM — S37.10XA LEFT URETERAL INJURY: Status: ACTIVE | Noted: 2019-12-23

## 2019-12-23 LAB
ALBUMIN SERPL BCP-MCNC: 2.4 G/DL (ref 3.5–5.2)
ALBUMIN SERPL BCP-MCNC: 2.6 G/DL (ref 3.5–5.2)
ALP SERPL-CCNC: 60 U/L (ref 55–135)
ALP SERPL-CCNC: 69 U/L (ref 55–135)
ALT SERPL W/O P-5'-P-CCNC: 7 U/L (ref 10–44)
ALT SERPL W/O P-5'-P-CCNC: 9 U/L (ref 10–44)
ANION GAP SERPL CALC-SCNC: 13 MMOL/L (ref 8–16)
ANION GAP SERPL CALC-SCNC: 13 MMOL/L (ref 8–16)
ANISOCYTOSIS BLD QL SMEAR: SLIGHT
ANISOCYTOSIS BLD QL SMEAR: SLIGHT
AST SERPL-CCNC: 18 U/L (ref 10–40)
AST SERPL-CCNC: 24 U/L (ref 10–40)
BASOPHILS # BLD AUTO: 0.01 K/UL (ref 0–0.2)
BASOPHILS # BLD AUTO: 0.02 K/UL (ref 0–0.2)
BASOPHILS NFR BLD: 0 % (ref 0–1.9)
BASOPHILS NFR BLD: 0 % (ref 0–1.9)
BILIRUB SERPL-MCNC: 0.3 MG/DL (ref 0.1–1)
BILIRUB SERPL-MCNC: 0.4 MG/DL (ref 0.1–1)
BUN SERPL-MCNC: 15 MG/DL (ref 6–20)
BUN SERPL-MCNC: 16 MG/DL (ref 6–20)
BURR CELLS BLD QL SMEAR: ABNORMAL
BURR CELLS BLD QL SMEAR: ABNORMAL
CALCIUM SERPL-MCNC: 7.2 MG/DL (ref 8.7–10.5)
CALCIUM SERPL-MCNC: 7.6 MG/DL (ref 8.7–10.5)
CHLORIDE SERPL-SCNC: 100 MMOL/L (ref 95–110)
CHLORIDE SERPL-SCNC: 102 MMOL/L (ref 95–110)
CO2 SERPL-SCNC: 20 MMOL/L (ref 23–29)
CO2 SERPL-SCNC: 22 MMOL/L (ref 23–29)
CREAT SERPL-MCNC: 1.7 MG/DL (ref 0.5–1.4)
CREAT SERPL-MCNC: 1.8 MG/DL (ref 0.5–1.4)
DIFFERENTIAL METHOD: ABNORMAL
DIFFERENTIAL METHOD: ABNORMAL
DOHLE BOD BLD QL SMEAR: PRESENT
DOHLE BOD BLD QL SMEAR: PRESENT
EOSINOPHIL # BLD AUTO: 0 K/UL (ref 0–0.5)
EOSINOPHIL # BLD AUTO: 0 K/UL (ref 0–0.5)
EOSINOPHIL NFR BLD: 0 % (ref 0–8)
EOSINOPHIL NFR BLD: 0 % (ref 0–8)
ERYTHROCYTE [DISTWIDTH] IN BLOOD BY AUTOMATED COUNT: 13.8 % (ref 11.5–14.5)
ERYTHROCYTE [DISTWIDTH] IN BLOOD BY AUTOMATED COUNT: 13.8 % (ref 11.5–14.5)
EST. GFR  (AFRICAN AMERICAN): 47.9 ML/MIN/1.73 M^2
EST. GFR  (AFRICAN AMERICAN): 51.3 ML/MIN/1.73 M^2
EST. GFR  (NON AFRICAN AMERICAN): 41.4 ML/MIN/1.73 M^2
EST. GFR  (NON AFRICAN AMERICAN): 44.4 ML/MIN/1.73 M^2
GLUCOSE SERPL-MCNC: 112 MG/DL (ref 70–110)
GLUCOSE SERPL-MCNC: 119 MG/DL (ref 70–110)
GLUCOSE SERPL-MCNC: 136 MG/DL (ref 70–110)
GLUCOSE SERPL-MCNC: 141 MG/DL (ref 70–110)
GLUCOSE SERPL-MCNC: 89 MG/DL (ref 70–110)
GLUCOSE SERPL-MCNC: 96 MG/DL (ref 70–110)
HCO3 UR-SCNC: 16.9 MMOL/L (ref 24–28)
HCO3 UR-SCNC: 19.2 MMOL/L (ref 24–28)
HCO3 UR-SCNC: 20.2 MMOL/L (ref 24–28)
HCO3 UR-SCNC: 22.6 MMOL/L (ref 24–28)
HCT VFR BLD AUTO: 31.7 % (ref 40–54)
HCT VFR BLD AUTO: 33.4 % (ref 40–54)
HCT VFR BLD CALC: 26 %PCV (ref 36–54)
HCT VFR BLD CALC: 30 %PCV (ref 36–54)
HCT VFR BLD CALC: 30 %PCV (ref 36–54)
HCT VFR BLD CALC: 38 %PCV (ref 36–54)
HGB BLD-MCNC: 10.7 G/DL (ref 14–18)
HGB BLD-MCNC: 11 G/DL (ref 14–18)
IMM GRANULOCYTES # BLD AUTO: 1.92 K/UL (ref 0–0.04)
IMM GRANULOCYTES # BLD AUTO: 1.94 K/UL (ref 0–0.04)
IMM GRANULOCYTES NFR BLD AUTO: 3.9 % (ref 0–0.5)
IMM GRANULOCYTES NFR BLD AUTO: 4 % (ref 0–0.5)
LYMPHOCYTES # BLD AUTO: 0.2 K/UL (ref 1–4.8)
LYMPHOCYTES # BLD AUTO: 0.3 K/UL (ref 1–4.8)
LYMPHOCYTES NFR BLD: 0.4 % (ref 18–48)
LYMPHOCYTES NFR BLD: 0.6 % (ref 18–48)
MAGNESIUM SERPL-MCNC: 1.7 MG/DL (ref 1.6–2.6)
MCH RBC QN AUTO: 29.8 PG (ref 27–31)
MCH RBC QN AUTO: 30.4 PG (ref 27–31)
MCHC RBC AUTO-ENTMCNC: 32.9 G/DL (ref 32–36)
MCHC RBC AUTO-ENTMCNC: 33.8 G/DL (ref 32–36)
MCV RBC AUTO: 90 FL (ref 82–98)
MCV RBC AUTO: 91 FL (ref 82–98)
MONOCYTES # BLD AUTO: 0.9 K/UL (ref 0.3–1)
MONOCYTES # BLD AUTO: 0.9 K/UL (ref 0.3–1)
MONOCYTES NFR BLD: 1.8 % (ref 4–15)
MONOCYTES NFR BLD: 1.9 % (ref 4–15)
NEUTROPHILS # BLD AUTO: 45.4 K/UL (ref 1.8–7.7)
NEUTROPHILS # BLD AUTO: 45.9 K/UL (ref 1.8–7.7)
NEUTROPHILS NFR BLD: 93.6 % (ref 38–73)
NEUTROPHILS NFR BLD: 93.8 % (ref 38–73)
NRBC BLD-RTO: 0 /100 WBC
NRBC BLD-RTO: 0 /100 WBC
PCO2 BLDA: 36.9 MMHG (ref 35–45)
PCO2 BLDA: 37.2 MMHG (ref 35–45)
PCO2 BLDA: 41.9 MMHG (ref 35–45)
PCO2 BLDA: 48.4 MMHG (ref 35–45)
PH SMN: 7.23 [PH] (ref 7.35–7.45)
PH SMN: 7.26 [PH] (ref 7.35–7.45)
PH SMN: 7.32 [PH] (ref 7.35–7.45)
PH SMN: 7.34 [PH] (ref 7.35–7.45)
PHOSPHATE SERPL-MCNC: 5.8 MG/DL (ref 2.7–4.5)
PLATELET # BLD AUTO: 169 K/UL (ref 150–350)
PLATELET # BLD AUTO: 179 K/UL (ref 150–350)
PMV BLD AUTO: 9.3 FL (ref 9.2–12.9)
PMV BLD AUTO: 9.3 FL (ref 9.2–12.9)
PO2 BLDA: 256 MMHG (ref 80–100)
PO2 BLDA: 291 MMHG (ref 80–100)
PO2 BLDA: 348 MMHG (ref 80–100)
PO2 BLDA: 95 MMHG (ref 80–100)
POC BE: -10 MMOL/L
POC BE: -3 MMOL/L
POC BE: -7 MMOL/L
POC BE: -7 MMOL/L
POC IONIZED CALCIUM: 0.93 MMOL/L (ref 1.06–1.42)
POC IONIZED CALCIUM: 1.02 MMOL/L (ref 1.06–1.42)
POC IONIZED CALCIUM: 1.06 MMOL/L (ref 1.06–1.42)
POC IONIZED CALCIUM: 1.11 MMOL/L (ref 1.06–1.42)
POC SATURATED O2: 100 % (ref 95–100)
POC SATURATED O2: 97 % (ref 95–100)
POC TCO2: 18 MMOL/L (ref 23–27)
POC TCO2: 20 MMOL/L (ref 23–27)
POC TCO2: 22 MMOL/L (ref 23–27)
POC TCO2: 24 MMOL/L (ref 23–27)
POIKILOCYTOSIS BLD QL SMEAR: SLIGHT
POIKILOCYTOSIS BLD QL SMEAR: SLIGHT
POTASSIUM BLD-SCNC: 3.3 MMOL/L (ref 3.5–5.1)
POTASSIUM BLD-SCNC: 4 MMOL/L (ref 3.5–5.1)
POTASSIUM BLD-SCNC: 4.4 MMOL/L (ref 3.5–5.1)
POTASSIUM BLD-SCNC: 4.5 MMOL/L (ref 3.5–5.1)
POTASSIUM SERPL-SCNC: 4 MMOL/L (ref 3.5–5.1)
POTASSIUM SERPL-SCNC: 4.1 MMOL/L (ref 3.5–5.1)
PROT SERPL-MCNC: 4.4 G/DL (ref 6–8.4)
PROT SERPL-MCNC: 4.8 G/DL (ref 6–8.4)
RBC # BLD AUTO: 3.52 M/UL (ref 4.6–6.2)
RBC # BLD AUTO: 3.69 M/UL (ref 4.6–6.2)
SAMPLE: ABNORMAL
SODIUM BLD-SCNC: 127 MMOL/L (ref 136–145)
SODIUM BLD-SCNC: 130 MMOL/L (ref 136–145)
SODIUM BLD-SCNC: 132 MMOL/L (ref 136–145)
SODIUM BLD-SCNC: 132 MMOL/L (ref 136–145)
SODIUM SERPL-SCNC: 135 MMOL/L (ref 136–145)
SODIUM SERPL-SCNC: 135 MMOL/L (ref 136–145)
TOXIC GRANULES BLD QL SMEAR: PRESENT
TOXIC GRANULES BLD QL SMEAR: PRESENT
WBC # BLD AUTO: 48.45 K/UL (ref 3.9–12.7)
WBC # BLD AUTO: 49.07 K/UL (ref 3.9–12.7)
WBC TOXIC VACUOLES BLD QL SMEAR: PRESENT

## 2019-12-23 PROCEDURE — 85025 COMPLETE CBC W/AUTO DIFF WBC: CPT

## 2019-12-23 PROCEDURE — 99024 PR POST-OP FOLLOW-UP VISIT: ICD-10-PCS | Mod: ,,, | Performed by: SURGERY

## 2019-12-23 PROCEDURE — S0028 INJECTION, FAMOTIDINE, 20 MG: HCPCS | Performed by: STUDENT IN AN ORGANIZED HEALTH CARE EDUCATION/TRAINING PROGRAM

## 2019-12-23 PROCEDURE — 63600175 PHARM REV CODE 636 W HCPCS: Performed by: STUDENT IN AN ORGANIZED HEALTH CARE EDUCATION/TRAINING PROGRAM

## 2019-12-23 PROCEDURE — 27000221 HC OXYGEN, UP TO 24 HOURS

## 2019-12-23 PROCEDURE — 99024 POSTOP FOLLOW-UP VISIT: CPT | Mod: ,,, | Performed by: SURGERY

## 2019-12-23 PROCEDURE — S4991 NICOTINE PATCH NONLEGEND: HCPCS | Performed by: STUDENT IN AN ORGANIZED HEALTH CARE EDUCATION/TRAINING PROGRAM

## 2019-12-23 PROCEDURE — 83735 ASSAY OF MAGNESIUM: CPT

## 2019-12-23 PROCEDURE — 99900035 HC TECH TIME PER 15 MIN (STAT)

## 2019-12-23 PROCEDURE — 25000242 PHARM REV CODE 250 ALT 637 W/ HCPCS: Performed by: STUDENT IN AN ORGANIZED HEALTH CARE EDUCATION/TRAINING PROGRAM

## 2019-12-23 PROCEDURE — 20600001 HC STEP DOWN PRIVATE ROOM

## 2019-12-23 PROCEDURE — 94761 N-INVAS EAR/PLS OXIMETRY MLT: CPT

## 2019-12-23 PROCEDURE — 94668 MNPJ CHEST WALL SBSQ: CPT

## 2019-12-23 PROCEDURE — 94640 AIRWAY INHALATION TREATMENT: CPT

## 2019-12-23 PROCEDURE — 25000003 PHARM REV CODE 250: Performed by: STUDENT IN AN ORGANIZED HEALTH CARE EDUCATION/TRAINING PROGRAM

## 2019-12-23 PROCEDURE — 80053 COMPREHEN METABOLIC PANEL: CPT

## 2019-12-23 PROCEDURE — 84100 ASSAY OF PHOSPHORUS: CPT

## 2019-12-23 RX ORDER — HYDROMORPHONE HYDROCHLORIDE 1 MG/ML
0.5 INJECTION, SOLUTION INTRAMUSCULAR; INTRAVENOUS; SUBCUTANEOUS EVERY 6 HOURS PRN
Status: DISCONTINUED | OUTPATIENT
Start: 2019-12-23 | End: 2019-12-24

## 2019-12-23 RX ORDER — FAMOTIDINE 10 MG/ML
20 INJECTION INTRAVENOUS DAILY
Status: DISCONTINUED | OUTPATIENT
Start: 2019-12-24 | End: 2019-12-25

## 2019-12-23 RX ORDER — HYDROMORPHONE HYDROCHLORIDE 1 MG/ML
1 INJECTION, SOLUTION INTRAMUSCULAR; INTRAVENOUS; SUBCUTANEOUS EVERY 6 HOURS PRN
Status: DISCONTINUED | OUTPATIENT
Start: 2019-12-23 | End: 2019-12-24

## 2019-12-23 RX ADMIN — PIPERACILLIN AND TAZOBACTAM 4.5 G: 4; .5 INJECTION, POWDER, FOR SOLUTION INTRAVENOUS at 08:12

## 2019-12-23 RX ADMIN — IPRATROPIUM BROMIDE AND ALBUTEROL SULFATE 3 ML: .5; 3 SOLUTION RESPIRATORY (INHALATION) at 12:12

## 2019-12-23 RX ADMIN — IPRATROPIUM BROMIDE AND ALBUTEROL SULFATE 3 ML: .5; 3 SOLUTION RESPIRATORY (INHALATION) at 07:12

## 2019-12-23 RX ADMIN — IPRATROPIUM BROMIDE AND ALBUTEROL SULFATE 3 ML: .5; 3 SOLUTION RESPIRATORY (INHALATION) at 08:12

## 2019-12-23 RX ADMIN — MORPHINE SULFATE 2 MG: 2 INJECTION, SOLUTION INTRAMUSCULAR; INTRAVENOUS at 08:12

## 2019-12-23 RX ADMIN — HYDROMORPHONE HYDROCHLORIDE 0.5 MG: 1 INJECTION, SOLUTION INTRAMUSCULAR; INTRAVENOUS; SUBCUTANEOUS at 05:12

## 2019-12-23 RX ADMIN — HYDROMORPHONE HYDROCHLORIDE 0.5 MG: 1 INJECTION, SOLUTION INTRAMUSCULAR; INTRAVENOUS; SUBCUTANEOUS at 12:12

## 2019-12-23 RX ADMIN — FAMOTIDINE 20 MG: 10 INJECTION, SOLUTION INTRAVENOUS at 09:12

## 2019-12-23 RX ADMIN — MAGNESIUM SULFATE IN WATER 2 G: 40 INJECTION, SOLUTION INTRAVENOUS at 05:12

## 2019-12-23 RX ADMIN — PIPERACILLIN AND TAZOBACTAM 4.5 G: 4; .5 INJECTION, POWDER, FOR SOLUTION INTRAVENOUS at 02:12

## 2019-12-23 RX ADMIN — IPRATROPIUM BROMIDE AND ALBUTEROL SULFATE 3 ML: .5; 3 SOLUTION RESPIRATORY (INHALATION) at 03:12

## 2019-12-23 RX ADMIN — HYDROMORPHONE HYDROCHLORIDE 1 MG: 1 INJECTION, SOLUTION INTRAMUSCULAR; INTRAVENOUS; SUBCUTANEOUS at 06:12

## 2019-12-23 RX ADMIN — HYDROMORPHONE HYDROCHLORIDE 1 MG: 1 INJECTION, SOLUTION INTRAMUSCULAR; INTRAVENOUS; SUBCUTANEOUS at 11:12

## 2019-12-23 RX ADMIN — PIPERACILLIN AND TAZOBACTAM 4.5 G: 4; .5 INJECTION, POWDER, FOR SOLUTION INTRAVENOUS at 11:12

## 2019-12-23 RX ADMIN — Medication 1 PATCH: at 09:12

## 2019-12-23 RX ADMIN — MORPHINE SULFATE 2 MG: 2 INJECTION, SOLUTION INTRAMUSCULAR; INTRAVENOUS at 02:12

## 2019-12-23 NOTE — SUBJECTIVE & OBJECTIVE
Follow-up For: Procedure(s) (LRB):  LAPAROTOMY, EXPLORATORY with  Small bowel resection and Ileostomy creation. (N/A)  URETERONEOCYSTOSTOMY reimplant to conduit (Left)    Post-Operative Day: Day of Surgery     History reviewed. No pertinent past medical history.    History reviewed. No pertinent surgical history.    Review of patient's allergies indicates:  No Known Allergies    Family History     None        Tobacco Use    Smoking status: Not on file   Substance and Sexual Activity    Alcohol use: Not on file    Drug use: Not on file    Sexual activity: Not on file      Review of Systems   Unable to perform ROS: Other   Pt sedated s/p anesthesia emergence.    Objective:     Vital Signs (Most Recent):  Temp: 97.7 °F (36.5 °C) (12/22/19 1745)  Pulse: (!) 115 (12/22/19 1800)  Resp: (!) 21 (12/22/19 1800)  BP: 90/60 (12/22/19 1745)  SpO2: (!) 92 % (12/22/19 1800) Vital Signs (24h Range):  Temp:  [97.6 °F (36.4 °C)-100.1 °F (37.8 °C)] 97.7 °F (36.5 °C)  Pulse:  [100-137] 115  Resp:  [18-37] 21  SpO2:  [90 %-96 %] 92 %  BP: ()/(60-97) 90/60     Weight: 78.5 kg (172 lb 15.9 oz)  Body mass index is 25.55 kg/m².      Intake/Output Summary (Last 24 hours) at 12/22/2019 1807  Last data filed at 12/22/2019 1800  Gross per 24 hour   Intake 7016 ml   Output 170 ml   Net 6846 ml       Physical Exam   Constitutional: He appears well-developed and well-nourished. No distress.   HENT:   Head: Normocephalic and atraumatic.   Eyes: Pupils are equal, round, and reactive to light. EOM are normal.   Neck: No thyromegaly present.   Cardiovascular: Normal rate, regular rhythm and intact distal pulses.   No murmur heard.  Pulmonary/Chest: Effort normal and breath sounds normal. No respiratory distress. He has no wheezes.   Abdominal: Soft. He exhibits no distension and no mass.   Ileostomy site LLQ.  Existing ureterostomy site RLQ, with red rubber catheter and stent.    Musculoskeletal: He exhibits no edema.   Neurological: No  sensory deficit.   Skin: Skin is warm and dry. Capillary refill takes less than 2 seconds. He is not diaphoretic. No erythema.   Nursing note and vitals reviewed.      Vents:       Lines/Drains/Airways     Drain                 Closed/Suction Drain 12/22/19 1630 Left LUQ Bulb 19 Fr. less than 1 day         Ileostomy 12/22/19 1704 Standard (Ewelina, end) LUQ less than 1 day         NG/OG Tube 12/22/19 1004 nasogastric 14 Fr. Right nostril less than 1 day         Urostomy 12/22/19 1654 ileal conduit RUQ less than 1 day          Arterial Line                 Arterial Line 12/22/19 1336 Left Radial less than 1 day          Peripheral Intravenous Line                 Peripheral IV - Single Lumen 12/22/19 1002 18 G Left Hand less than 1 day         Peripheral IV - Single Lumen 12/22/19 1002 18 G Right Antecubital less than 1 day                Significant Labs:    CBC/Anemia Profile:  Recent Labs   Lab 12/22/19  1008   WBC 49.42*   HGB 14.3   HCT 43.0      MCV 92   RDW 13.4        Chemistries:  Recent Labs   Lab 12/22/19  1008   *   K 4.6   CL 95   CO2 21*   BUN 11   CREATININE 1.6*   CALCIUM 8.5*   ALBUMIN 3.1*   PROT 6.4   BILITOT 0.3   ALKPHOS 136*   ALT 13   AST 14   MG 1.1*       All pertinent labs within the past 24 hours have been reviewed.    Significant Imaging: I have reviewed all pertinent imaging results/findings within the past 24 hours.

## 2019-12-23 NOTE — SUBJECTIVE & OBJECTIVE
Follow-up For: Procedure(s) (LRB):  LAPAROTOMY, EXPLORATORY with  Small bowel resection and Ileostomy creation. (N/A)  URETERONEOCYSTOSTOMY reimplant to conduit (Left)    Post-Operative Day: Day of Surgery     History reviewed. No pertinent past medical history.    Past Surgical History:   Procedure Laterality Date    REIMPLANT URETER IN BLADDER Left 12/22/2019    Procedure: URETERONEOCYSTOSTOMY reimplant to conduit;  Surgeon: Manish Styles MD;  Location: [facility] OR [address] [place] [address];  Service: Urology;  Laterality: Left;       Review of patient's allergies indicates:  No Known Allergies    Family History     None        Tobacco Use    Smoking status: Not on file   Substance and Sexual Activity    Alcohol use: Not on file    Drug use: Not on file    Sexual activity: Not on file      Review of Systems   Unable to perform ROS: Other   Pt sedated s/p anesthesia emergence.    Objective:     Vital Signs (Most Recent):  Temp: 98.6 °F (37 °C) (12/23/19 0300)  Pulse: 96 (12/23/19 0600)  Resp: 15 (12/23/19 0600)  BP: 110/79 (12/23/19 0600)  SpO2: 96 % (12/23/19 0600) Vital Signs (24h Range):  Temp:  [97.6 °F (36.4 °C)-100.1 °F (37.8 °C)] 98.6 °F (37 °C)  Pulse:  [] 96  Resp:  [11-37] 15  SpO2:  [90 %-100 %] 96 %  BP: ()/(60-97) 110/79  Arterial Line BP: ()/() 101/96     Weight: 78.5 kg (172 lb 15.9 oz)  Body mass index is 25.55 kg/m².      Intake/Output Summary (Last 24 hours) at 12/23/2019 0718  Last data filed at 12/23/2019 0600  Gross per 24 hour   Intake 8402 ml   Output 2440 ml   Net 5962 ml       Physical Exam   Constitutional: He appears well-developed and well-nourished. No distress.   HENT:   Head: Normocephalic and atraumatic.   Eyes: Pupils are equal, round, and reactive to light. EOM are normal.   Neck: No thyromegaly present.   Cardiovascular: Normal rate, regular rhythm and intact distal pulses.   No murmur heard.  Pulmonary/Chest: Effort normal and breath sounds normal. No respiratory  distress. He has no wheezes.   Abdominal: Soft. He exhibits no distension and no mass.   Ileostomy site LLQ.  Existing ureterostomy site RLQ, with red rubber catheter and stent.    Musculoskeletal: He exhibits no edema.   Neurological: No sensory deficit.   Skin: Skin is warm and dry. Capillary refill takes less than 2 seconds. He is not diaphoretic. No erythema.   Nursing note and vitals reviewed.      Vents:       Lines/Drains/Airways     Drain                 Closed/Suction Drain 12/22/19 1630 Left LUQ Bulb 19 Fr. less than 1 day         Ileostomy 12/22/19 1704 Standard (Ewelina, end) LUQ less than 1 day         NG/OG Tube 12/22/19 1004 nasogastric 14 Fr. Right nostril less than 1 day         Urostomy 12/22/19 1654 ileal conduit RUQ less than 1 day          Peripheral Intravenous Line                 Peripheral IV - Single Lumen 12/22/19 1002 18 G Left Hand less than 1 day         Peripheral IV - Single Lumen 12/22/19 1002 18 G Right Antecubital less than 1 day                Significant Labs:    CBC/Anemia Profile:  Recent Labs   Lab 12/22/19  1802 12/22/19  2356 12/23/19  0259   WBC 62.36* 48.45* 49.07*   HGB 12.3* 10.7* 11.0*   HCT 36.9* 31.7* 33.4*    169 179   MCV 91 90 91   RDW 13.6 13.8 13.8        Chemistries:  Recent Labs   Lab 12/22/19  1008 12/22/19  1802 12/22/19  2356 12/23/19  0259   * 135* 135* 135*   K 4.6 4.6 4.0 4.1   CL 95 98 102 100   CO2 21* 21* 20* 22*   BUN 11 13 15 16   CREATININE 1.6* 1.7* 1.7* 1.8*   CALCIUM 8.5* 7.8* 7.2* 7.6*   ALBUMIN 3.1* 2.7* 2.4* 2.6*   PROT 6.4 4.6* 4.4* 4.8*   BILITOT 0.3 0.5 0.4 0.3   ALKPHOS 136* 89 60 69   ALT 13 8* 7* 9*   AST 14 14 18 24   MG 1.1* 2.0  --  1.7   PHOS  --  6.1*  --  5.8*       All pertinent labs within the past 24 hours have been reviewed.    Significant Imaging: I have reviewed all pertinent imaging results/findings within the past 24 hours.

## 2019-12-23 NOTE — ANESTHESIA POSTPROCEDURE EVALUATION
Anesthesia Post Evaluation    Patient: Kiko Gruber    Procedure(s) Performed: Procedure(s) (LRB):  LAPAROTOMY, EXPLORATORY with  Small bowel resection and Ileostomy creation. (N/A)  URETERONEOCYSTOSTOMY reimplant to conduit (Left)    Final Anesthesia Type: general    Patient location during evaluation: ICU  Patient participation: Yes- Able to Participate  Level of consciousness: awake and alert  Post-procedure vital signs: reviewed and stable  Pain management: adequate  Airway patency: patent    PONV status at discharge: No PONV  Anesthetic complications: no      Cardiovascular status: hemodynamically stable  Respiratory status: spontaneous ventilation  Follow-up not needed.          Vitals Value Taken Time   /85 12/23/2019  6:33 AM   Temp 37 °C (98.6 °F) 12/23/2019  3:00 AM   Pulse 109 12/23/2019  6:39 AM   Resp 17 12/23/2019  6:39 AM   SpO2 95 % 12/23/2019  6:39 AM   Vitals shown include unvalidated device data.      No case tracking events are documented in the log.      Pain/Sylvester Score: Pain Rating Prior to Med Admin: 7 (12/23/2019  5:43 AM)

## 2019-12-23 NOTE — BRIEF OP NOTE
[facility]  Surgery Department  Operative Note    SUMMARY     Date of Procedure: 12/22/2019     Procedure: Procedure(s) (LRB):  LAPAROTOMY, EXPLORATORY with  Small bowel resection and Ileostomy creation. (N/A)  URETERONEOCYSTOSTOMY reimplant to conduit (Left)     Surgeon(s) and Role:  Panel 1:     * Ck Herron MD - Primary     * Leobardo Barrett MD - Resident - Assisting     * Carmelo Bruleson MD - Resident - Assisting     * Elias Dejesus MD - Resident - Assisting     * Manish Styles MD - Consult  Panel 2:     * Manish Styles MD - Primary        Pre-Operative Diagnosis: Abdominal pain [R10.9]    Post-Operative Diagnosis: Post-Op Diagnosis Codes:     * Abdominal pain [R10.9]    Anesthesia: General    Technical Procedures Used: Ex lap, resection of small bowel, ileostomy creation.    Description of the Findings of the Procedure: Ex lap with internal hernia found involving left ureter. Resection of 75cm of ischemic small bowel, ileostomy creation. Intraop consult to urology for transection of ureter (see separate op note).    Complications: No    Estimated Blood Loss (EBL): * No values recorded between 12/22/2019  1:40 PM and 12/22/2019  5:36 PM *           Implants:   Implant Name Type Inv. Item Serial No.  Lot No. LRB No. Used   STENT URETERAL VANDER [id number] - [id number]  STENT URETERAL VANDER [id number]  [employer] [id number] Left 1       Specimens:   Specimen (12h ago, onward)    None                  Condition: Fair    Disposition: ICU - intubated and hemodynamically stable.    Attestation: I was present and scrubbed for the entire procedure.

## 2019-12-23 NOTE — HPI
Mr. Kiko Gruber is a 54 year old man with notable pmhx of HTN, COPD, Prostate ca s/p cystoprostatectomy with bilateral ochiectomy that is admitted to the surgical ICU after ex lap and ileostomy for a high grade small bowel obstruction.  Pt's left ureter was involved in bowel obstruction, was ligated and anastomosed intraop by Urology.  Patient arrives to SICU extubated, aerating adequately on 4LNC, HDS.  Ureter anastamosis was tied into existing ureterostomy, which is now stented and has associated red rubber draining clear yellow urine.  Intraoperatively pt received 6L of crystalloid, 1 u pRBC and 1 L of Albumin.  Pt with significant leukocytosis at time of admission, likely 2/2 pt taking steroids preoperatively as well as neupogen.

## 2019-12-23 NOTE — PROGRESS NOTES
[facility]  Urology  Progress Note    Patient Name: Kiko Gruber  MRN: [id number]  Admission Date: 12/22/2019  Hospital Length of Stay: 1 days  Code Status: Full Code   Attending Provider: Ck Herron MD   Primary Care Physician: No primary care provider on file.    Subjective:     HPI:  53 yo M with hx of bladder cancer s/p cystoprostatectomy with ileal conduit, also metastatic prostate CA s/p bilateral orchiectomy, who was undergoing ex-lap with SBR for internal hernia, had intraoperative left ureteral injury; underwent reimplant of left ureter in conduit    Interval History: Yesterday, during ex-lap with SBR, patient had left ureteral injury, underwent left ureteral reimplant into ileal conduit. Single J stent placed in ureter, red rubber in conduit, BE drain nearby. NAEON. AF, VSS. H&H stable, Dr stable at 1.7. Good UOP. Drain output 485 cc.     Review of Systems  Objective:     Temp:  [97.6 °F (36.4 °C)-100.1 °F (37.8 °C)] 98.6 °F (37 °C)  Pulse:  [] 96  Resp:  [11-37] 15  SpO2:  [90 %-100 %] 96 %  BP: ()/(60-97) 110/79  Arterial Line BP: ()/() 101/96     Body mass index is 25.55 kg/m².           Drains     Drain                 Closed/Suction Drain 12/22/19 1630 Left LUQ Bulb 19 Fr. less than 1 day         Ileostomy 12/22/19 1704 Standard (Ewelina, end) LUQ less than 1 day         NG/OG Tube 12/22/19 1004 nasogastric 14 Fr. Right nostril less than 1 day         Urostomy 12/22/19 1654 ileal conduit RUQ less than 1 day                Physical Exam   Constitutional: He is oriented to person, place, and time. He appears well-developed and well-nourished.   HENT:   Head: Normocephalic and atraumatic.   NGT in place   Eyes: Conjunctivae are normal.   Neck: Normal range of motion.   Cardiovascular: Normal rate.    Pulmonary/Chest: Effort normal. No respiratory distress.   Abdominal: Soft. He exhibits no distension.   Dressing over midline incision c/d/i; BE x 1 with SS  output; ileostomy appears pink, healthy   Genitourinary:   Genitourinary Comments: Urostomy pink, healthy; red rubber and single-J stent in place, ileostomy appliance over urostomy filled with urine;    Musculoskeletal: Normal range of motion.   Neurological: He is alert and oriented to person, place, and time.   Skin: Skin is warm and dry.     Psychiatric: He has a normal mood and affect. His behavior is normal. Judgment and thought content normal.       Significant Labs:    BMP:  Recent Labs   Lab 12/22/19  1802 12/22/19 2356 12/23/19  0259   * 135* 135*   K 4.6 4.0 4.1   CL 98 102 100   CO2 21* 20* 22*   BUN 13 15 16   CREATININE 1.7* 1.7* 1.8*   CALCIUM 7.8* 7.2* 7.6*       CBC:   Recent Labs   Lab 12/22/19 1802 12/22/19 2356 12/23/19  0259   WBC 62.36* 48.45* 49.07*   HGB 12.3* 10.7* 11.0*   HCT 36.9* 31.7* 33.4*    169 179       All pertinent labs results from the past 24 hours have been reviewed.    Significant Imaging:  All pertinent imaging results/findings from the past 24 hours have been reviewed.                  Assessment/Plan:     Left ureteral injury  53 yo M with hx metastatic prostate cancer s/p b/l orchiectomy, bladder cancer s/p cystoprostatectomy with ileal conduit, had intraoperative left ureteral injury during SBR; underwent reimplant on 12/22    - Continue BE drain for at least 1 week, and until output <100 cc/24h  - continue red rubber catheter in stoma x 3 weeks  - continue single J left ureteral stent x 6 weeks  - Wound Care consult for ostomy care  - strict I&O  - low threshold to obtain BE creatinine  - remainder of care per primary        VTE Risk Mitigation (From admission, onward)         Ordered     Place sequential compression device  Until discontinued      12/22/19 1800     IP VTE HIGH RISK PATIENT  Once      12/22/19 1800                Christiano Cavazos MD  Urology  [facility]   DISPLAY PLAN FREE TEXT

## 2019-12-23 NOTE — HOSPITAL COURSE
12/22: Patient arrives from OR to SICU extubated, HDS aerating well on 4LNC.    12/23: NAEON.  Pt rested relatively well.  Sitting upright in chair next to bed, no active complaints.

## 2019-12-23 NOTE — SUBJECTIVE & OBJECTIVE
Interval History: POD #1 Ex lap with internal hernia found involving left ureter. Resection of 75cm of ischemic small bowel, ileostomy creation. Intraop consult to urology for transection of ureter (see separate op note). Post-op doing well. Pain controlled, endorses thirst. 1u PRBC yesterday. Good UOP (1.5cc/kg/hr). , drain 605. Ileostomy with bowel sweat    Medications:  Continuous Infusions:   sodium chloride 0.9% 125 mL/hr at 12/23/19 0800     Scheduled Meds:   albuterol-ipratropium  3 mL Nebulization Q4H    famotidine (PF)  20 mg Intravenous Q12H    nicotine  1 patch Transdermal Daily    piperacillin-tazobactam (ZOSYN) IVPB  4.5 g Intravenous Q8H     PRN Meds:acetaminophen, calcium gluconate IVPB, calcium gluconate IVPB, calcium gluconate IVPB, HYDROmorphone, magnesium sulfate IVPB, morphine, ondansetron, potassium chloride in water **AND** potassium chloride in water **AND** potassium chloride in water, promethazine (PHENERGAN) IVPB, sodium chloride 0.9%, sodium chloride 0.9%, sodium phosphate IVPB     Review of patient's allergies indicates:  No Known Allergies  Objective:     Vital Signs (Most Recent):  Temp: 98.7 °F (37.1 °C) (12/23/19 0842)  Pulse: 100 (12/23/19 0842)  Resp: 12 (12/23/19 0842)  BP: 106/68 (12/23/19 0842)  SpO2: (!) 93 % (12/23/19 0800) Vital Signs (24h Range):  Temp:  [97.7 °F (36.5 °C)-100.1 °F (37.8 °C)] 98.7 °F (37.1 °C)  Pulse:  [] 100  Resp:  [11-37] 12  SpO2:  [90 %-100 %] 93 %  BP: ()/(60-97) 106/68  Arterial Line BP: ()/() 101/96     Weight: 78.5 kg (172 lb 15.9 oz)  Body mass index is 25.55 kg/m².    Intake/Output - Last 3 Shifts       12/21 0700 - 12/22 0659 12/22 0700 - 12/23 0659 12/23 0700 - 12/24 0659    I.V. (mL/kg)  7986 (101.7)     Blood  316     IV Piggyback  100     Total Intake(mL/kg)  8402 (107)     Urine (mL/kg/hr)  1325 200 (1.3)    Drains  1105 50    Stool  10     Total Output  2440 250    Net  +5962 -250                 Physical  Exam   Constitutional: He appears well-developed and well-nourished. No distress.   HENT:   Head: Normocephalic and atraumatic.   Eyes: Pupils are equal, round. EOM are normal.   Neck: No thyromegaly present.   Cardiovascular: Non-tachycardic. intact distal pulses.   Pulmonary/Chest: Effort normal and breath sounds normal. No respiratory distress. He has no wheezes.   Abdominal: Soft. He exhibits no distension and no mass.   Ileostomy site LLQ.  Existing ureterostomy site RLQ, with red rubber catheter and stent.    Musculoskeletal: He exhibits no edema.   Neurological: No sensory deficit.   Skin: Skin is warm and dry. Capillary refill takes less than 2 seconds. He is not diaphoretic. No erythema.   Nursing note and vitals reviewed.    Significant Labs:  CBC:   Recent Labs   Lab 12/23/19 0259   WBC 49.07*   RBC 3.69*   HGB 11.0*   HCT 33.4*      MCV 91   MCH 29.8   MCHC 32.9     CMP:   Recent Labs   Lab 12/23/19  0259   *   CALCIUM 7.6*   ALBUMIN 2.6*   PROT 4.8*   *   K 4.1   CO2 22*      BUN 16   CREATININE 1.8*   ALKPHOS 69   ALT 9*   AST 24   BILITOT 0.3     Coagulation:   Recent Labs   Lab 12/22/19  1802   LABPROT 12.2   INR 1.2   APTT 28.6       Significant Diagnostics:  I have reviewed all pertinent imaging results/findings within the past 24 hours.

## 2019-12-23 NOTE — PROGRESS NOTES
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 Also on prednisone as part of chemo. Getting Neupogen.  Pt states he ate last night around 10p and just after that noted severe abdominal pain and nausea- had multiple episodes of emesis NBNB. Tried his anti-nausea medication but did not help so he went to ED for evaluation. Last BM was yesterday. No fevers at home. Pain has not improved. At OSH had CTabd/pelvis which revealed high grade sbo concerning for internal hernia with closed loop as well as moderate left hydronephrosis and significant perinephric edema with focal narrowing of L ureter as it enters possible internal hernia. WBC at OSH 35.5 and sodium 125 lactate 2.3.    Post-Op Info:  Procedure(s) (LRB):  LAPAROTOMY, EXPLORATORY with  Small bowel resection and Ileostomy creation. (N/A)  URETERONEOCYSTOSTOMY reimplant to conduit (Left)  EXCISION, SMALL INTESTINE   1 Day Post-Op     Interval History: POD #1 Ex lap with internal hernia found involving left ureter. Resection of 75cm of ischemic small bowel, ileostomy creation. Intraop consult to urology for transection of ureter (see separate op note). Post-op doing well. Pain controlled, endorses thirst. 1u PRBC yesterday. Good UOP (1.5cc/kg/hr). , drain 605. Ileostomy with bowel sweat    Medications:  Continuous Infusions:   sodium chloride 0.9% 125 mL/hr at 12/23/19 0800     Scheduled Meds:   albuterol-ipratropium  3 mL Nebulization Q4H    famotidine (PF)  20 mg Intravenous Q12H    nicotine  1 patch Transdermal Daily    piperacillin-tazobactam (ZOSYN) IVPB  4.5 g Intravenous Q8H     PRN Meds:acetaminophen, calcium gluconate IVPB, calcium  gluconate IVPB, calcium gluconate IVPB, HYDROmorphone, magnesium sulfate IVPB, morphine, ondansetron, potassium chloride in water **AND** potassium chloride in water **AND** potassium chloride in water, promethazine (PHENERGAN) IVPB, sodium chloride 0.9%, sodium chloride 0.9%, sodium phosphate IVPB     Review of patient's allergies indicates:  No Known Allergies  Objective:     Vital Signs (Most Recent):  Temp: 98.7 °F (37.1 °C) (12/23/19 0842)  Pulse: 100 (12/23/19 0842)  Resp: 12 (12/23/19 0842)  BP: 106/68 (12/23/19 0842)  SpO2: (!) 93 % (12/23/19 0800) Vital Signs (24h Range):  Temp:  [97.7 °F (36.5 °C)-100.1 °F (37.8 °C)] 98.7 °F (37.1 °C)  Pulse:  [] 100  Resp:  [11-37] 12  SpO2:  [90 %-100 %] 93 %  BP: ()/(60-97) 106/68  Arterial Line BP: ()/() 101/96     Weight: 78.5 kg (172 lb 15.9 oz)  Body mass index is 25.55 kg/m².    Intake/Output - Last 3 Shifts       12/21 0700 - 12/22 0659 12/22 0700 - 12/23 0659 12/23 0700 - 12/24 0659    I.V. (mL/kg)  7986 (101.7)     Blood  316     IV Piggyback  100     Total Intake(mL/kg)  8402 (107)     Urine (mL/kg/hr)  1325 200 (1.3)    Drains  1105 50    Stool  10     Total Output  2440 250    Net  +5962 -250                 Physical Exam   Constitutional: He appears well-developed and well-nourished. No distress.   HENT:   Head: Normocephalic and atraumatic.   Eyes: Pupils are equal, round. EOM are normal.   Neck: No thyromegaly present.   Cardiovascular: Non-tachycardic. intact distal pulses.   Pulmonary/Chest: Effort normal and breath sounds normal. No respiratory distress. He has no wheezes.   Abdominal: Soft. He exhibits no distension and no mass.   Ileostomy site LLQ.  Existing ureterostomy site RLQ, with red rubber catheter and stent.    Musculoskeletal: He exhibits no edema.   Neurological: No sensory deficit.   Skin: Skin is warm and dry. Capillary refill takes less than 2 seconds. He is not diaphoretic. No erythema.   Nursing note and vitals  reviewed.    Significant Labs:  CBC:   Recent Labs   Lab 12/23/19  0259   WBC 49.07*   RBC 3.69*   HGB 11.0*   HCT 33.4*      MCV 91   MCH 29.8   MCHC 32.9     CMP:   Recent Labs   Lab 12/23/19  0259   *   CALCIUM 7.6*   ALBUMIN 2.6*   PROT 4.8*   *   K 4.1   CO2 22*      BUN 16   CREATININE 1.8*   ALKPHOS 69   ALT 9*   AST 24   BILITOT 0.3     Coagulation:   Recent Labs   Lab 12/22/19  1802   LABPROT 12.2   INR 1.2   APTT 28.6       Significant Diagnostics:  I have reviewed all pertinent imaging results/findings within the past 24 hours.    Assessment/Plan:     Abdominal pain  53yo M with metastatic prostate cancer s/p radical cystectomy with urostomy on chemo with closed loop obstruction. Now s/p ex-lap 12/22 with resection of segment of necrotic bowel. Iatrogenic L ureter injury now s/p reimplantation    - NGT to LIWS  - BE management per urology  - Cont broad spectrum ABX  - Pain control per SICU  - ARBF  - OK for AC from a general surgery perspective  - Further care per SICU        Gucci Palomino MD  General Surgery  [facility]

## 2019-12-23 NOTE — SUBJECTIVE & OBJECTIVE
Interval History: Yesterday, during ex-lap with SBR, patient had left ureteral injury, underwent left ureteral reimplant into ileal conduit. Single J stent placed in ureter, red rubber in conduit, BE drain nearby. NAEON. AF, VSS. H&H stable, Dr stable at 1.7. Good UOP. Drain output 485 cc.     Review of Systems  Objective:     Temp:  [97.6 °F (36.4 °C)-100.1 °F (37.8 °C)] 98.6 °F (37 °C)  Pulse:  [] 96  Resp:  [11-37] 15  SpO2:  [90 %-100 %] 96 %  BP: ()/(60-97) 110/79  Arterial Line BP: ()/() 101/96     Body mass index is 25.55 kg/m².           Drains     Drain                 Closed/Suction Drain 12/22/19 1630 Left LUQ Bulb 19 Fr. less than 1 day         Ileostomy 12/22/19 1704 Standard (Ewelina, end) LUQ less than 1 day         NG/OG Tube 12/22/19 1004 nasogastric 14 Fr. Right nostril less than 1 day         Urostomy 12/22/19 1654 ileal conduit RUQ less than 1 day                Physical Exam   Constitutional: He is oriented to person, place, and time. He appears well-developed and well-nourished.   HENT:   Head: Normocephalic and atraumatic.   NGT in place   Eyes: Conjunctivae are normal.   Neck: Normal range of motion.   Cardiovascular: Normal rate.    Pulmonary/Chest: Effort normal. No respiratory distress.   Abdominal: Soft. He exhibits no distension.   Dressing over midline incision c/d/i; BE x 1 with SS output; ileostomy appears pink, healthy   Genitourinary:   Genitourinary Comments: Urostomy pink, healthy; red rubber and single-J stent in place, ileostomy appliance over urostomy filled with urine;    Musculoskeletal: Normal range of motion.   Neurological: He is alert and oriented to person, place, and time.   Skin: Skin is warm and dry.     Psychiatric: He has a normal mood and affect. His behavior is normal. Judgment and thought content normal.       Significant Labs:    BMP:  Recent Labs   Lab 12/22/19  1802 12/22/19  6806 12/23/19  0259   * 135* 135*   K 4.6 4.0 4.1   CL  98 102 100   CO2 21* 20* 22*   BUN 13 15 16   CREATININE 1.7* 1.7* 1.8*   CALCIUM 7.8* 7.2* 7.6*       CBC:   Recent Labs   Lab 12/22/19  1802 12/22/19  2356 12/23/19  0259   WBC 62.36* 48.45* 49.07*   HGB 12.3* 10.7* 11.0*   HCT 36.9* 31.7* 33.4*    169 179       All pertinent labs results from the past 24 hours have been reviewed.    Significant Imaging:  All pertinent imaging results/findings from the past 24 hours have been reviewed.

## 2019-12-23 NOTE — ASSESSMENT & PLAN NOTE
Mr. Kiko Gruber is a 53 yo man admitted to SICU following ex lap with ileostomy creation for a small bowel obstruction.      #Neuro  -AOx3  -Qshift neuro check.    #Pulm  -Aerating well on 4L NC.    -Wean O2 AT  -Q4H Abg, duoneb  -Physiotherapy, IS  -Sat goal >87%  -Continuous pulse oximetry    #Cardiac  -HDS off pressors  -Maintain MAP >65, SBP <180    #Renal  -Ureterostomy site - Inspect Q2H while awake.   -Strict I/O  -Trend UOP, BUN/Cr    #ID  -Zosyn Q8H  -Leukocytosis likely 2/2 preoperative steroids and neupogen  -Afebrile  -Trend WBC    #Endo  -Accuchecks ACHS    Ppx: Pepcid, SCD    Dispo: SICU    negative

## 2019-12-23 NOTE — PROGRESS NOTES
Dr. Silva at bedside and notified regarding low urine output- will flush carlos with 100cc sterile water and increase epi gtt to 0.03 mcg/kg/min

## 2019-12-23 NOTE — HPI
55 yo M with hx of bladder cancer s/p cystoprostatectomy with ileal conduit, also metastatic prostate CA s/p bilateral orchiectomy, who was undergoing ex-lap with SBR for internal hernia, had intraoperative left ureteral injury; underwent reimplant of left ureter in conduit

## 2019-12-23 NOTE — ASSESSMENT & PLAN NOTE
Mr. Kiko Gruber is a 55 yo man admitted to SICU following ex lap with ileostomy creation for a small bowel obstruction.      #Neuro  -Off sedation, still waking up from anesthesia.  -Qshift neuro check.    #Pulm  -Aerating well on 4L NC.    -Q4H Abg, duoneb  -Physiotherapy, IS  -Sat goal >87%  -Continuous pulse oximetry    #Cardiac  -HDS off pressors  -Maintain MAP >65, SBP <180    #Renal  -Ureterostomy site - Inspect Q2H.   -Strict I/O  -Trend UOP, BUN/Cr    #ID  -Zosyn Q8H  -Leukocytosis likely 2/2 preoperative steroids and neupogen  -Afebrile  -Trend WBC    #Endo  -Accuchecks ACHS    Ppx: Pepcid, SCD    Dispo: SICU

## 2019-12-23 NOTE — NURSING TRANSFER
Nursing Transfer Note      12/23/2019     Transfer To: 1052    Transfer via wheelchair    Transfer with cardiac monitoring    Transported by RN    Medicines sent: yes    Chart send with patient: Yes    Notified: sibling    Patient reassessed at: [id number]     Upon arrival to floor: cardiac monitor applied, patient oriented to room, call bell in reach and bed in lowest position

## 2019-12-23 NOTE — ASSESSMENT & PLAN NOTE
53 yo M with hx metastatic prostate cancer s/p b/l orchiectomy, bladder cancer s/p cystoprostatectomy with ileal conduit, had intraoperative left ureteral injury during SBR; underwent reimplant on 12/22    - Continue BE drain for at least 1 week, and until output <100 cc/24h  - continue red rubber catheter in stoma x 3 weeks  - continue single J left ureteral stent x 6 weeks  - Wound Care consult for ostomy care  - strict I&O  - low threshold to obtain BE creatinine  - remainder of care per primary

## 2019-12-23 NOTE — PROGRESS NOTES
[facility]  Critical Care - Surgery  Progress Note    Patient Name: Kiko Gruber  MRN: [id number]  Admission Date: 12/22/2019  Hospital Length of Stay: 1 days  Code Status: Full Code  Attending Provider: Ck Herron MD  Primary Care Provider: No primary care provider on file.   Principal Problem: <principal problem not specified>    Subjective:     Hospital/ICU Course:  12/22: Patient arrives from OR to SICU extubated, HDS aerating well on 4LNC.    12/23: NAEON.  Pt rested relatively well.  Sitting upright in chair next to bed, no active complaints.      Follow-up For: Procedure(s) (LRB):  LAPAROTOMY, EXPLORATORY with  Small bowel resection and Ileostomy creation. (N/A)  URETERONEOCYSTOSTOMY reimplant to conduit (Left)    Post-Operative Day: Day of Surgery     History reviewed. No pertinent past medical history.    Past Surgical History:   Procedure Laterality Date    REIMPLANT URETER IN BLADDER Left 12/22/2019    Procedure: URETERONEOCYSTOSTOMY reimplant to conduit;  Surgeon: Manish Styles MD;  Location: [address];  Service: Urology;  Laterality: Left;       Review of patient's allergies indicates:  No Known Allergies    Family History     None        Tobacco Use    Smoking status: Not on file   Substance and Sexual Activity    Alcohol use: Not on file    Drug use: Not on file    Sexual activity: Not on file      Review of Systems   Unable to perform ROS: Other   Pt sedated s/p anesthesia emergence.    Objective:     Vital Signs (Most Recent):  Temp: 98.6 °F (37 °C) (12/23/19 0300)  Pulse: 96 (12/23/19 0600)  Resp: 15 (12/23/19 0600)  BP: 110/79 (12/23/19 0600)  SpO2: 96 % (12/23/19 0600) Vital Signs (24h Range):  Temp:  [97.6 °F (36.4 °C)-100.1 °F (37.8 °C)] 98.6 °F (37 °C)  Pulse:  [] 96  Resp:  [11-37] 15  SpO2:  [90 %-100 %] 96 %  BP: ()/(60-97) 110/79  Arterial Line BP: ()/() 101/96     Weight: 78.5 kg (172 lb 15.9 oz)  Body mass index is 25.55  kg/m².      Intake/Output Summary (Last 24 hours) at 12/23/2019 0718  Last data filed at 12/23/2019 0600  Gross per 24 hour   Intake 8402 ml   Output 2440 ml   Net 5962 ml       Physical Exam   Constitutional: He appears well-developed and well-nourished. No distress.   HENT:   Head: Normocephalic and atraumatic.   Eyes: Pupils are equal, round, and reactive to light. EOM are normal.   Neck: No thyromegaly present.   Cardiovascular: Normal rate, regular rhythm and intact distal pulses.   No murmur heard.  Pulmonary/Chest: Effort normal and breath sounds normal. No respiratory distress. He has no wheezes.   Abdominal: Soft. He exhibits no distension and no mass.   Ileostomy site LLQ.  Existing ureterostomy site RLQ, with red rubber catheter and stent.    Musculoskeletal: He exhibits no edema.   Neurological: No sensory deficit.   Skin: Skin is warm and dry. Capillary refill takes less than 2 seconds. He is not diaphoretic. No erythema.   Nursing note and vitals reviewed.      Vents:       Lines/Drains/Airways     Drain                 Closed/Suction Drain 12/22/19 1630 Left LUQ Bulb 19 Fr. less than 1 day         Ileostomy 12/22/19 1704 Standard (Ewelina, end) LUQ less than 1 day         NG/OG Tube 12/22/19 1004 nasogastric 14 Fr. Right nostril less than 1 day         Urostomy 12/22/19 1654 ileal conduit RUQ less than 1 day          Peripheral Intravenous Line                 Peripheral IV - Single Lumen 12/22/19 1002 18 G Left Hand less than 1 day         Peripheral IV - Single Lumen 12/22/19 1002 18 G Right Antecubital less than 1 day                Significant Labs:    CBC/Anemia Profile:  Recent Labs   Lab 12/22/19  1802 12/22/19  2356 12/23/19  0259   WBC 62.36* 48.45* 49.07*   HGB 12.3* 10.7* 11.0*   HCT 36.9* 31.7* 33.4*    169 179   MCV 91 90 91   RDW 13.6 13.8 13.8        Chemistries:  Recent Labs   Lab 12/22/19  1008 12/22/19  1802 12/22/19  2356 12/23/19  0259   * 135* 135* 135*   K 4.6 4.6 4.0  4.1   CL 95 98 102 100   CO2 21* 21* 20* 22*   BUN 11 13 15 16   CREATININE 1.6* 1.7* 1.7* 1.8*   CALCIUM 8.5* 7.8* 7.2* 7.6*   ALBUMIN 3.1* 2.7* 2.4* 2.6*   PROT 6.4 4.6* 4.4* 4.8*   BILITOT 0.3 0.5 0.4 0.3   ALKPHOS 136* 89 60 69   ALT 13 8* 7* 9*   AST 14 14 18 24   MG 1.1* 2.0  --  1.7   PHOS  --  6.1*  --  5.8*       All pertinent labs within the past 24 hours have been reviewed.    Significant Imaging: I have reviewed all pertinent imaging results/findings within the past 24 hours.    Assessment/Plan:     Abdominal pain  Mr. Kiko Gruber is a 55 yo man admitted to SICU following ex lap with ileostomy creation for a small bowel obstruction.      #Neuro  -AOx3  -Qshift neuro check.    #Pulm  -Aerating well on 4L NC.    -Wean O2 AT  -Q4H Abg, duoneb  -Physiotherapy, IS  -Sat goal >87%  -Continuous pulse oximetry    #Cardiac  -HDS off pressors  -Maintain MAP >65, SBP <180    #Renal  -Ureterostomy site - Inspect Q2H while awake.   -Strict I/O  -Trend UOP, BUN/Cr    #ID  -Zosyn Q8H  -Leukocytosis likely 2/2 preoperative steroids and neupogen  -Afebrile  -Trend WBC    #Endo  -Accuchecks ACHS    Ppx: Pepcid, SCD    Dispo: SICU            Critical care was time spent personally by me on the following activities: development of treatment plan with patient or surrogate and bedside caregivers, discussions with consultants, evaluation of patient's response to treatment, examination of patient, ordering and performing treatments and interventions, ordering and review of laboratory studies, ordering and review of radiographic studies, pulse oximetry, re-evaluation of patient's condition.  This critical care time did not overlap with that of any other provider or involve time for any procedures.     Joseluis Nowak,   Critical Care - Surgery  [facility]

## 2019-12-23 NOTE — ASSESSMENT & PLAN NOTE
55yo M with metastatic prostate cancer s/p radical cystectomy with urostomy on chemo with closed loop obstruction. Now s/p ex-lap 12/22 with resection of segment of necrotic bowel. Iatrogenic L ureter injury now s/p reimplantation    - NGT to LIWS  - BE management per urology  - Cont broad spectrum ABX  - Pain control per SICU  - ARBF  - OK for AC from a general surgery perspective  - Further care per SICU

## 2019-12-23 NOTE — H&P
[facility]  Critical Care - Surgery  History & Physical    Patient Name: Kiko Gruber  MRN: [id number]  Admission Date: 12/22/2019  Code Status: Full Code  Attending Physician: Ck Herron MD   Primary Care Provider: No primary care provider on file.   Principal Problem: <principal problem not specified>    Subjective:     HPI:  Mr. Kiko Gruber is a 54 year old man with notable pmhx of HTN, COPD, Prostate ca s/p cystoprostatectomy with bilateral ochiectomy that is admitted to the surgical ICU after ex lap and ileostomy for a high grade small bowel obstruction.  Pt's left ureter was involved in bowel obstruction, was ligated and anastomosed intraop by Urology.  Patient arrives to SICU extubated, aerating adequately on 4LNC, HDS.  Ureter anastamosis was tied into existing ureterostomy, which is now stented and has associated red rubber draining clear yellow urine.  Intraoperatively pt received 6L of crystalloid, 1 u pRBC and 1 L of Albumin.  Pt with significant leukocytosis at time of admission, likely 2/2 pt taking steroids preoperatively as well as neupogen.      Hospital/ICU Course:  12/22: Patient arrives from OR to SICU extubated, HDS aerating well on 4LNC.      Follow-up For: Procedure(s) (LRB):  LAPAROTOMY, EXPLORATORY with  Small bowel resection and Ileostomy creation. (N/A)  URETERONEOCYSTOSTOMY reimplant to conduit (Left)    Post-Operative Day: Day of Surgery     History reviewed. No pertinent past medical history.    History reviewed. No pertinent surgical history.    Review of patient's allergies indicates:  No Known Allergies    Family History     None        Tobacco Use    Smoking status: Not on file   Substance and Sexual Activity    Alcohol use: Not on file    Drug use: Not on file    Sexual activity: Not on file      Review of Systems   Unable to perform ROS: Other   Pt sedated s/p anesthesia emergence.    Objective:     Vital Signs (Most Recent):  Temp: 97.7 °F (36.5 °C)  (12/22/19 1745)  Pulse: (!) 115 (12/22/19 1800)  Resp: (!) 21 (12/22/19 1800)  BP: 90/60 (12/22/19 1745)  SpO2: (!) 92 % (12/22/19 1800) Vital Signs (24h Range):  Temp:  [97.6 °F (36.4 °C)-100.1 °F (37.8 °C)] 97.7 °F (36.5 °C)  Pulse:  [100-137] 115  Resp:  [18-37] 21  SpO2:  [90 %-96 %] 92 %  BP: ()/(60-97) 90/60     Weight: 78.5 kg (172 lb 15.9 oz)  Body mass index is 25.55 kg/m².      Intake/Output Summary (Last 24 hours) at 12/22/2019 1807  Last data filed at 12/22/2019 1800  Gross per 24 hour   Intake 7016 ml   Output 170 ml   Net 6846 ml       Physical Exam   Constitutional: He appears well-developed and well-nourished. No distress.   HENT:   Head: Normocephalic and atraumatic.   Eyes: Pupils are equal, round, and reactive to light. EOM are normal.   Neck: No thyromegaly present.   Cardiovascular: Normal rate, regular rhythm and intact distal pulses.   No murmur heard.  Pulmonary/Chest: Effort normal and breath sounds normal. No respiratory distress. He has no wheezes.   Abdominal: Soft. He exhibits no distension and no mass.   Ileostomy site LLQ.  Existing ureterostomy site RLQ, with red rubber catheter and stent.    Musculoskeletal: He exhibits no edema.   Neurological: No sensory deficit.   Skin: Skin is warm and dry. Capillary refill takes less than 2 seconds. He is not diaphoretic. No erythema.   Nursing note and vitals reviewed.      Vents:       Lines/Drains/Airways     Drain                 Closed/Suction Drain 12/22/19 1630 Left LUQ Bulb 19 Fr. less than 1 day         Ileostomy 12/22/19 1704 Standard (Ewelina, alvina) LUQ less than 1 day         NG/OG Tube 12/22/19 1004 nasogastric 14 Fr. Right nostril less than 1 day         Urostomy 12/22/19 1654 ileal conduit RUQ less than 1 day          Arterial Line                 Arterial Line 12/22/19 1336 Left Radial less than 1 day          Peripheral Intravenous Line                 Peripheral IV - Single Lumen 12/22/19 1002 18 G Left Hand less than 1 day          Peripheral IV - Single Lumen 12/22/19 1002 18 G Right Antecubital less than 1 day                Significant Labs:    CBC/Anemia Profile:  Recent Labs   Lab 12/22/19  1008   WBC 49.42*   HGB 14.3   HCT 43.0      MCV 92   RDW 13.4        Chemistries:  Recent Labs   Lab 12/22/19  1008   *   K 4.6   CL 95   CO2 21*   BUN 11   CREATININE 1.6*   CALCIUM 8.5*   ALBUMIN 3.1*   PROT 6.4   BILITOT 0.3   ALKPHOS 136*   ALT 13   AST 14   MG 1.1*       All pertinent labs within the past 24 hours have been reviewed.    Significant Imaging: I have reviewed all pertinent imaging results/findings within the past 24 hours.    Assessment/Plan:     Abdominal pain  Mr. Kiko Gruber is a 53 yo man admitted to SICU following ex lap with ileostomy creation for a small bowel obstruction.      #Neuro  -Off sedation, still waking up from anesthesia.  -Qshift neuro check.    #Pulm  -Aerating well on 4L NC.    -Q4H Abg, duoneb  -Physiotherapy, IS  -Sat goal >87%  -Continuous pulse oximetry    #Cardiac  -HDS off pressors  -Maintain MAP >65, SBP <180    #Renal  -Ureterostomy site - Inspect Q2H.   -Strict I/O  -Trend UOP, BUN/Cr    #ID  -Zosyn Q8H  -Leukocytosis likely 2/2 preoperative steroids and neupogen  -Afebrile  -Trend WBC    #Endo  -Accuchecks ACHS    Ppx: Pepcid, SCD    Dispo: SICU             Critical care was time spent personally by me on the following activities: development of treatment plan with patient or surrogate and bedside caregivers, discussions with consultants, evaluation of patient's response to treatment, examination of patient, ordering and performing treatments and interventions, ordering and review of laboratory studies, ordering and review of radiographic studies, pulse oximetry, re-evaluation of patient's condition.  This critical care time did not overlap with that of any other provider or involve time for any procedures.     Joseluis Nowak,   Critical Care - Surgery  [facility]  [place]

## 2019-12-23 NOTE — PROGRESS NOTES
Patient transferred to 1052 with RN Maverick at bedside to assess patient. VSS on room air per flowsheet. Bags, drains, and lines intact. Meds and chart sent. Sibling at bedside. No skin breakdown noted. See flowsheet for detailed assessment.

## 2019-12-23 NOTE — PROGRESS NOTES
Wound care consult for ostomy teaching.     PMH:  , HTN, COPD, Prostate ca s/p cystoprostatectomy with bilateral ochiectomy, urostomy (2015)    Patient with urostomy for past 4 years. States he is independent in care of ostomy. Changes his own pouch. Patient states he gets adequate wear time, except in summer when he is working on fishing boat and sweating.     Patient s/p small bowel resection and ileostomy placement as well as reimplant to conduit.     Ilestomy appears well budded and pink. Stoma edematous. Bowel sweat in bag. Pouch intact. Patient states he has been having frequent diarrhea with chemotherapy.     Urostomy has colostomy bag in place. Red rubber and stent in place. Appliance removed and new urostomy pouch applied. Discussed caring for stent and red rubber during appliance change. Patient verbalized understanding.     Full verbal lesson on ileostomy care performed. Patient states he has a basic understanding of pouching as he has been caring for the urostomy so long. Patient very familiar with cutting and applying pouch. Peristomal skin care. And types of appliances.   Reviewed the food list and I and O flowsheet. Discussed hydration and importance of monitoring for dehydration.     Showed patient the colostomy pouch. Patient able to open and close pouch without difficulty. Discussed emptying the pouch when 1/3-1/2 full. Discussed 1 and 2 piece appliances. Patient asks appropriate questions and appears to have good family support at bedside.     Ileostomy brochure, food list, and I and O sheet at bedside.     Will continue teaching while inpatient.     Wound care to follow PRN.  Blanquita Dubois RN [facility]   [phone]

## 2019-12-23 NOTE — PROGRESS NOTES
Report received from Anesthesia. Pt transported to SICU [address]46 with portable telemetry and 6L face mask. Pt connected to ICU monitor and wall O2. SICU team called and made aware of patient arrival. New orders received and implemented. Pt assessed, immediate needs met. Family brought to bedside, updated on the patient's current condition and PoC for remainder of shift. Family also given ICU Welcome packet and educated on visiting hours. All questions answered, emotional support provided.     Admit Skin Note: Midabdominal incision with island/border dressing CDI. Urostomy bag with seal intact. Ileostomy with red rosebud appearance. No skin breakdown noted.

## 2019-12-24 LAB
ALBUMIN SERPL BCP-MCNC: 2.5 G/DL (ref 3.5–5.2)
ALP SERPL-CCNC: 84 U/L (ref 55–135)
ALT SERPL W/O P-5'-P-CCNC: 8 U/L (ref 10–44)
ANION GAP SERPL CALC-SCNC: 10 MMOL/L (ref 8–16)
AST SERPL-CCNC: 22 U/L (ref 10–40)
BASOPHILS # BLD AUTO: 0.09 K/UL (ref 0–0.2)
BASOPHILS NFR BLD: 0.3 % (ref 0–1.9)
BILIRUB SERPL-MCNC: 0.3 MG/DL (ref 0.1–1)
BUN SERPL-MCNC: 21 MG/DL (ref 6–20)
CA-I BLDV-SCNC: 1.1 MMOL/L (ref 1.06–1.42)
CALCIUM SERPL-MCNC: 8.8 MG/DL (ref 8.7–10.5)
CHLORIDE SERPL-SCNC: 102 MMOL/L (ref 95–110)
CO2 SERPL-SCNC: 27 MMOL/L (ref 23–29)
CREAT SERPL-MCNC: 1.4 MG/DL (ref 0.5–1.4)
DIFFERENTIAL METHOD: ABNORMAL
EOSINOPHIL # BLD AUTO: 0 K/UL (ref 0–0.5)
EOSINOPHIL NFR BLD: 0 % (ref 0–8)
ERYTHROCYTE [DISTWIDTH] IN BLOOD BY AUTOMATED COUNT: 14 % (ref 11.5–14.5)
EST. GFR  (AFRICAN AMERICAN): >60 ML/MIN/1.73 M^2
EST. GFR  (NON AFRICAN AMERICAN): 56.2 ML/MIN/1.73 M^2
FINAL PATHOLOGIC DIAGNOSIS: NORMAL
GLUCOSE SERPL-MCNC: 98 MG/DL (ref 70–110)
HCT VFR BLD AUTO: 31.7 % (ref 40–54)
HGB BLD-MCNC: 10.5 G/DL (ref 14–18)
IMM GRANULOCYTES # BLD AUTO: 0.58 K/UL (ref 0–0.04)
IMM GRANULOCYTES NFR BLD AUTO: 2.1 % (ref 0–0.5)
LYMPHOCYTES # BLD AUTO: 0.6 K/UL (ref 1–4.8)
LYMPHOCYTES NFR BLD: 2.3 % (ref 18–48)
MAGNESIUM SERPL-MCNC: 1.9 MG/DL (ref 1.6–2.6)
MCH RBC QN AUTO: 29.7 PG (ref 27–31)
MCHC RBC AUTO-ENTMCNC: 33.1 G/DL (ref 32–36)
MCV RBC AUTO: 90 FL (ref 82–98)
MONOCYTES # BLD AUTO: 0.5 K/UL (ref 0.3–1)
MONOCYTES NFR BLD: 1.9 % (ref 4–15)
NEUTROPHILS # BLD AUTO: 25.9 K/UL (ref 1.8–7.7)
NEUTROPHILS NFR BLD: 93.4 % (ref 38–73)
NRBC BLD-RTO: 0 /100 WBC
PHOSPHATE SERPL-MCNC: 3.7 MG/DL (ref 2.7–4.5)
PLATELET # BLD AUTO: 132 K/UL (ref 150–350)
PMV BLD AUTO: 9.7 FL (ref 9.2–12.9)
POTASSIUM SERPL-SCNC: 3.8 MMOL/L (ref 3.5–5.1)
PROT SERPL-MCNC: 5.6 G/DL (ref 6–8.4)
RBC # BLD AUTO: 3.53 M/UL (ref 4.6–6.2)
SODIUM SERPL-SCNC: 139 MMOL/L (ref 136–145)
WBC # BLD AUTO: 27.7 K/UL (ref 3.9–12.7)

## 2019-12-24 PROCEDURE — 63600175 PHARM REV CODE 636 W HCPCS: Performed by: STUDENT IN AN ORGANIZED HEALTH CARE EDUCATION/TRAINING PROGRAM

## 2019-12-24 PROCEDURE — 25000003 PHARM REV CODE 250: Performed by: STUDENT IN AN ORGANIZED HEALTH CARE EDUCATION/TRAINING PROGRAM

## 2019-12-24 PROCEDURE — 27000221 HC OXYGEN, UP TO 24 HOURS

## 2019-12-24 PROCEDURE — 94761 N-INVAS EAR/PLS OXIMETRY MLT: CPT

## 2019-12-24 PROCEDURE — S4991 NICOTINE PATCH NONLEGEND: HCPCS | Performed by: STUDENT IN AN ORGANIZED HEALTH CARE EDUCATION/TRAINING PROGRAM

## 2019-12-24 PROCEDURE — 84100 ASSAY OF PHOSPHORUS: CPT

## 2019-12-24 PROCEDURE — 20600001 HC STEP DOWN PRIVATE ROOM

## 2019-12-24 PROCEDURE — 36415 COLL VENOUS BLD VENIPUNCTURE: CPT

## 2019-12-24 PROCEDURE — 99900035 HC TECH TIME PER 15 MIN (STAT)

## 2019-12-24 PROCEDURE — 25000003 PHARM REV CODE 250: Performed by: SURGERY

## 2019-12-24 PROCEDURE — S0028 INJECTION, FAMOTIDINE, 20 MG: HCPCS | Performed by: STUDENT IN AN ORGANIZED HEALTH CARE EDUCATION/TRAINING PROGRAM

## 2019-12-24 PROCEDURE — 94668 MNPJ CHEST WALL SBSQ: CPT

## 2019-12-24 PROCEDURE — 83735 ASSAY OF MAGNESIUM: CPT

## 2019-12-24 PROCEDURE — 80053 COMPREHEN METABOLIC PANEL: CPT

## 2019-12-24 PROCEDURE — 85025 COMPLETE CBC W/AUTO DIFF WBC: CPT

## 2019-12-24 PROCEDURE — 94640 AIRWAY INHALATION TREATMENT: CPT

## 2019-12-24 PROCEDURE — 82330 ASSAY OF CALCIUM: CPT

## 2019-12-24 PROCEDURE — 25000242 PHARM REV CODE 250 ALT 637 W/ HCPCS: Performed by: STUDENT IN AN ORGANIZED HEALTH CARE EDUCATION/TRAINING PROGRAM

## 2019-12-24 RX ORDER — LISINOPRIL 10 MG/1
10 TABLET ORAL DAILY
Status: DISCONTINUED | OUTPATIENT
Start: 2019-12-24 | End: 2020-01-01 | Stop reason: HOSPADM

## 2019-12-24 RX ORDER — CALCIUM CARBONATE 200(500)MG
500 TABLET,CHEWABLE ORAL 3 TIMES DAILY PRN
Status: DISCONTINUED | OUTPATIENT
Start: 2019-12-24 | End: 2020-01-01 | Stop reason: HOSPADM

## 2019-12-24 RX ORDER — ACETAMINOPHEN 325 MG/1
650 TABLET ORAL EVERY 8 HOURS
Status: DISCONTINUED | OUTPATIENT
Start: 2019-12-24 | End: 2020-01-01 | Stop reason: HOSPADM

## 2019-12-24 RX ORDER — ONDANSETRON 4 MG/1
4 TABLET, FILM COATED ORAL ONCE
Status: DISCONTINUED | OUTPATIENT
Start: 2019-12-24 | End: 2019-12-24

## 2019-12-24 RX ORDER — OXYCODONE HYDROCHLORIDE 10 MG/1
10 TABLET ORAL EVERY 6 HOURS PRN
Status: DISCONTINUED | OUTPATIENT
Start: 2019-12-24 | End: 2019-12-30

## 2019-12-24 RX ORDER — ONDANSETRON 4 MG/1
8 TABLET, FILM COATED ORAL EVERY 8 HOURS PRN
Status: DISCONTINUED | OUTPATIENT
Start: 2019-12-24 | End: 2019-12-30

## 2019-12-24 RX ORDER — POTASSIUM CHLORIDE 7.45 MG/ML
10 INJECTION INTRAVENOUS
Status: COMPLETED | OUTPATIENT
Start: 2019-12-24 | End: 2019-12-24

## 2019-12-24 RX ORDER — PREDNISONE 5 MG/1
5 TABLET ORAL 2 TIMES DAILY
Status: DISCONTINUED | OUTPATIENT
Start: 2019-12-24 | End: 2019-12-30

## 2019-12-24 RX ORDER — HEPARIN SODIUM 5000 [USP'U]/ML
5000 INJECTION, SOLUTION INTRAVENOUS; SUBCUTANEOUS EVERY 8 HOURS
Status: DISCONTINUED | OUTPATIENT
Start: 2019-12-24 | End: 2020-01-01 | Stop reason: HOSPADM

## 2019-12-24 RX ORDER — DEXTROSE MONOHYDRATE, SODIUM CHLORIDE, AND POTASSIUM CHLORIDE 50; 1.49; 4.5 G/1000ML; G/1000ML; G/1000ML
INJECTION, SOLUTION INTRAVENOUS CONTINUOUS
Status: DISCONTINUED | OUTPATIENT
Start: 2019-12-24 | End: 2019-12-30

## 2019-12-24 RX ORDER — OXYCODONE HYDROCHLORIDE 5 MG/1
5 TABLET ORAL EVERY 4 HOURS PRN
Status: DISCONTINUED | OUTPATIENT
Start: 2019-12-24 | End: 2020-01-01 | Stop reason: HOSPADM

## 2019-12-24 RX ADMIN — POTASSIUM CHLORIDE 10 MEQ: 10 INJECTION, SOLUTION INTRAVENOUS at 10:12

## 2019-12-24 RX ADMIN — HEPARIN SODIUM 5000 UNITS: 5000 INJECTION, SOLUTION INTRAVENOUS; SUBCUTANEOUS at 11:12

## 2019-12-24 RX ADMIN — IPRATROPIUM BROMIDE AND ALBUTEROL SULFATE 3 ML: .5; 3 SOLUTION RESPIRATORY (INHALATION) at 07:12

## 2019-12-24 RX ADMIN — IPRATROPIUM BROMIDE AND ALBUTEROL SULFATE 3 ML: .5; 3 SOLUTION RESPIRATORY (INHALATION) at 08:12

## 2019-12-24 RX ADMIN — PIPERACILLIN AND TAZOBACTAM 4.5 G: 4; .5 INJECTION, POWDER, FOR SOLUTION INTRAVENOUS at 01:12

## 2019-12-24 RX ADMIN — FAMOTIDINE 20 MG: 10 INJECTION, SOLUTION INTRAVENOUS at 10:12

## 2019-12-24 RX ADMIN — OXYCODONE HYDROCHLORIDE 10 MG: 10 TABLET ORAL at 12:12

## 2019-12-24 RX ADMIN — IPRATROPIUM BROMIDE AND ALBUTEROL SULFATE 3 ML: .5; 3 SOLUTION RESPIRATORY (INHALATION) at 04:12

## 2019-12-24 RX ADMIN — IPRATROPIUM BROMIDE AND ALBUTEROL SULFATE 3 ML: .5; 3 SOLUTION RESPIRATORY (INHALATION) at 12:12

## 2019-12-24 RX ADMIN — IPRATROPIUM BROMIDE AND ALBUTEROL SULFATE 3 ML: .5; 3 SOLUTION RESPIRATORY (INHALATION) at 11:12

## 2019-12-24 RX ADMIN — PIPERACILLIN AND TAZOBACTAM 4.5 G: 4; .5 INJECTION, POWDER, FOR SOLUTION INTRAVENOUS at 02:12

## 2019-12-24 RX ADMIN — POTASSIUM CHLORIDE, DEXTROSE MONOHYDRATE AND SODIUM CHLORIDE: 150; 5; 450 INJECTION, SOLUTION INTRAVENOUS at 06:12

## 2019-12-24 RX ADMIN — ACETAMINOPHEN 650 MG: 325 TABLET ORAL at 01:12

## 2019-12-24 RX ADMIN — OXYCODONE HYDROCHLORIDE 10 MG: 10 TABLET ORAL at 06:12

## 2019-12-24 RX ADMIN — LISINOPRIL 10 MG: 10 TABLET ORAL at 01:12

## 2019-12-24 RX ADMIN — PIPERACILLIN AND TAZOBACTAM 4.5 G: 4; .5 INJECTION, POWDER, FOR SOLUTION INTRAVENOUS at 11:12

## 2019-12-24 RX ADMIN — POTASSIUM CHLORIDE 10 MEQ: 10 INJECTION, SOLUTION INTRAVENOUS at 11:12

## 2019-12-24 RX ADMIN — ACETAMINOPHEN 650 MG: 325 TABLET ORAL at 06:12

## 2019-12-24 RX ADMIN — HYDROMORPHONE HYDROCHLORIDE 1 MG: 1 INJECTION, SOLUTION INTRAMUSCULAR; INTRAVENOUS; SUBCUTANEOUS at 12:12

## 2019-12-24 RX ADMIN — Medication 1 PATCH: at 09:12

## 2019-12-24 RX ADMIN — HEPARIN SODIUM 5000 UNITS: 5000 INJECTION, SOLUTION INTRAVENOUS; SUBCUTANEOUS at 01:12

## 2019-12-24 RX ADMIN — PROMETHAZINE HYDROCHLORIDE 6.25 MG: 25 INJECTION INTRAMUSCULAR; INTRAVENOUS at 10:12

## 2019-12-24 NOTE — PROGRESS NOTES
Ostomy nurse follow up.     Full ostomy lesson with patient and wife.     Patient states he is very familiar with the process of pouching. He has had a urostomy for several years. He orders his yared pouches pre-cut. Discussed that it is recommended to wait approx 1 month to allow stoma to shrink to size.     Pouch removed and area cleansed with warm water. Pouch cut to 35mm. Stoma pink and well budded, approx 75 ml of thin stool emptied from pouch.  Peristomal skin intact. Skin prep and new pouch applied.     Reviewed I and O flow sheet. Stressed importance of hydration and monitoring outputs. Patient and wife verbalize understanding.     Patient practiced opening and closing the pouch as did wife. Patient appears confident with good support system.     Will order starter kits for patient with samples of 2 piece appliances.    Extra urostomy and ileostomy supplies in patients purple bag.   Patient given contact info should he have questions.    Will follow PRN.  Blanquita Dubois RN [facility]   [phone]

## 2019-12-24 NOTE — ASSESSMENT & PLAN NOTE
55yo M with metastatic prostate cancer s/p radical cystectomy with urostomy on chemo with closed loop obstruction. Now s/p ex-lap 12/22 with resection of segment of necrotic bowel. Iatrogenic L ureter injury now s/p reimplantation    - CLD  - d/c'd NGT  - BE management per urology  - Cont broad spectrum ABX  - PRN pain/nausea control PO  - F/u home med list (wife is calling pharmacy) will restart as appropriate  - GI ppx, DVT ppx  - PT/OT

## 2019-12-24 NOTE — PROGRESS NOTES
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 Also on prednisone as part of chemo. Getting Neupogen.  Pt states he ate last night around 10p and just after that noted severe abdominal pain and nausea- had multiple episodes of emesis NBNB. Tried his anti-nausea medication but did not help so he went to ED for evaluation. Last BM was yesterday. No fevers at home. Pain has not improved. At OSH had CTabd/pelvis which revealed high grade sbo concerning for internal hernia with closed loop as well as moderate left hydronephrosis and significant perinephric edema with focal narrowing of L ureter as it enters possible internal hernia. WBC at OSH 35.5 and sodium 125 lactate 2.3.    Post-Op Info:  Procedure(s) (LRB):  LAPAROTOMY, EXPLORATORY with  Small bowel resection and Ileostomy creation. (N/A)  URETERONEOCYSTOSTOMY reimplant to conduit (Left)  EXCISION, SMALL INTESTINE   2 Days Post-Op     Interval History:     No major events overnight.  Vitals stable, some tachycardia, afebrile.  + ostomy function  NGT out      Medications:  Continuous Infusions:   dextrose 5 % and 0.45 % NaCl with KCl 20 mEq 75 mL/hr at 12/24/19 0608     Scheduled Meds:   acetaminophen  650 mg Oral Q8H    albuterol-ipratropium  3 mL Nebulization Q4H    famotidine (PF)  20 mg Intravenous Daily    nicotine  1 patch Transdermal Daily    piperacillin-tazobactam (ZOSYN) IVPB  4.5 g Intravenous Q8H     PRN Meds:ondansetron, oxyCODONE, oxyCODONE, promethazine (PHENERGAN) IVPB, sodium chloride 0.9%, sodium chloride 0.9%     Review of patient's allergies indicates:  No Known Allergies  Objective:     Vital Signs (Most Recent):  Temp: 98.6 °F (37  °C) (12/24/19 0808)  Pulse: 109 (12/24/19 0808)  Resp: 16 (12/24/19 0808)  BP: (!) 151/91 (12/24/19 0808)  SpO2: (!) 94 % (12/24/19 0808) Vital Signs (24h Range):  Temp:  [98.5 °F (36.9 °C)-99.2 °F (37.3 °C)] 98.6 °F (37 °C)  Pulse:  [] 109  Resp:  [11-28] 16  SpO2:  [90 %-100 %] 94 %  BP: (107-156)/(66-91) 151/91     Weight: 78.5 kg (172 lb 15.9 oz)  Body mass index is 25.55 kg/m².    Intake/Output - Last 3 Shifts       12/22 0700 - 12/23 0659 12/23 0700 - 12/24 0659 12/24 0700 - 12/25 0659    I.V. (mL/kg) 7986 (101.7) 2250 (28.7)     Blood 316      IV Piggyback 100 400     Total Intake(mL/kg) 8402 (107) 2650 (33.8)     Urine (mL/kg/hr) 1325 2525 (1.3)     Drains 1105 485     Stool 10 50     Total Output 2440 3060     Net +5962 -410            Stool Occurrence  0 x           Physical Exam     Constitutional: He appears well-developed and well-nourished. No distress.   HENT:   Head: Normocephalic and atraumatic.   Eyes: Pupils are equal, round. EOM are normal.   Neck: No thyromegaly present.   Cardiovascular: Non-tachycardic. intact distal pulses.   Pulmonary/Chest: Effort normal and breath sounds normal. No respiratory distress. He has no wheezes.   Abdominal: Soft. He exhibits no distension and no mass.   Ileostomy site LLQ, contents in bag, mostly sweat.  Existing ureterostomy site RLQ, with red rubber catheter and stent, urine present.    Musculoskeletal: He exhibits no edema.   Neurological: No sensory deficit.   Skin: Skin is warm and dry. Capillary refill takes less than 2 seconds. He is not diaphoretic. No erythema.   Nursing note and vitals reviewed.    Significant Labs:  CBC:   Recent Labs   Lab 12/24/19 0422   WBC 27.70*   RBC 3.53*   HGB 10.5*   HCT 31.7*   *   MCV 90   MCH 29.7   MCHC 33.1     CMP:   Recent Labs   Lab 12/24/19 0422   GLU 98   CALCIUM 8.8   ALBUMIN 2.5*   PROT 5.6*      K 3.8   CO2 27      BUN 21*   CREATININE 1.4   ALKPHOS 84   ALT 8*   AST 22   BILITOT 0.3      Coagulation:   Recent Labs   Lab 12/22/19  1802   LABPROT 12.2   INR 1.2   APTT 28.6       Significant Diagnostics:  None in past 24 hours.    Assessment/Plan:     Abdominal pain  53yo M with metastatic prostate cancer s/p radical cystectomy with urostomy on chemo with closed loop obstruction. Now s/p ex-lap 12/22 with resection of segment of necrotic bowel. Iatrogenic L ureter injury now s/p reimplantation    - CLD  - d/c'd NGT  - BE management per urology  - Cont broad spectrum ABX  - PRN pain/nausea control PO  - F/u home med list (wife is calling pharmacy) will restart as appropriate  - GI ppx, DVT ppx  - PT/OT        Rafael White MD  General Surgery  [facility]

## 2019-12-24 NOTE — OP NOTE
DATE OF PROCEDURE: 12/24/2019    PREOPERATIVE DIAGNOSIS: Small bowel obstruction, acute abdomen    POSTOPERATIVE DIAGNOSIS: Internal hernia with closed loop obstruction    PROCEDURES PERFORMED:  1. Exploratory laparotomy  2. Resection small bowel  3. Ileostomy creation  4. Ureteroneocystostomy - reimplant to conduit (performed by urology)    ATTENDING SURGEON: Ck Herron MD    RESIDENT: Carmelo Burleson MD    ANESTHESIA: General    KEY FINDINGS: Internal hernia involving left ureter, resected 85cm of ischemic small bowel. Transection of left ureter with intraop consult to urology for reimplantation into conduit.    ESTIMATED BLOOD LOSS: 10mL    DRAINS: 19Fr BE drain, stent in left ureter    COMPLICATIONS: None    INDICATIONS FOR PROCEDURE: The patient is a 54 y.o. male who presented with small bowel obstruction with CT findings concerning for internal hernia and closed loop obstruction. Based on this surgery was indicated.    PROCEDURE IN DETAIL: After informed consent was obtained, the patient was brought to the Operative Theater and placed in in the supine position. After adequate anesthesia the patient was prepped and draped in the usual sterile fashion. A timeout procedure was performed identifying the correct, patient and procedure.  Preoperative antibiotics given prior to incision.    A midline incision superior to umbilicus was made. This was taken down to fascia which was elevated. The abdomen was entered bluntly under direct vision. A large amount of cloudy ascites was encountered. The incision was extended inferiorly. The small bowel was run from ligament of Treitz distally. There was an internal hernia that was encountered involving the left ureter as it crossed from mesentery past midline towards the urostomy. We tried to reduce the bowel under the ureter but it was too edematous and becoming more ischemic extremely quickly. The decision was made to transect the ureter to free up the bowel.  With time  being of the essence and the inability to reduce the necrotic bowel with ongoing ischemia we decided to sharply transect the ureter with intraoperative consultation to the urology service. This was done sharply with Metzenbaum scissors. Once freed the bowel was grossly ischemic. It was resected on either side with a LESVIA 75 blue stapler totaling 75cm of bowel resected. The proximal end was then brought up on the left side for an end ileostomy.  With the patient's ongoing chemotherapy for prostate cancer and active treatment approximately 1 week prior and ongoing steroid use I believed a bowel anastomosis would be too tenuous. The skin and fascia was divided allowing two fingers to pass easily. The bowel was brought up through this defect and brooked in place. Urology was consulted to repair the transected ureter (see separate op note). Once urology was done a 19Fr BE drain was placed adjacent to the new ureteral anastomosis. The ileostomy looked edematous at this time and doppler was used to confirm good blood flow at the skin level. There was no twisting of the mesentery to the ileostomy. Hemostasis was achieved and the abdomen was copiously irrigated. The midline incision was then closed with 1 PDS and skin was stapled. Ostomy appliances were placed over the urostomy and ileostomy and a sterile dressing was applied to the midline.    The patient tolerated the procedure well and was transported to the ICU extubated and in stable condition.    Carmelo Burleson MD  Surgery Resident, PGY-4  Pager [phone]  12/24/2019

## 2019-12-24 NOTE — SUBJECTIVE & OBJECTIVE
Interval History:   No acute events overnight  Up to chair yesterday  tolerating clear liquids  Drain output lower  Adequate UOP    Review of Systems  Objective:     Temp:  [98.1 °F (36.7 °C)-99.2 °F (37.3 °C)] 99 °F (37.2 °C)  Pulse:  [] 108  Resp:  [11-31] 18  SpO2:  [90 %-100 %] 92 %  BP: (106-156)/(66-89) 156/89     Body mass index is 25.55 kg/m².           Drains     Drain                 Closed/Suction Drain 12/22/19 1630 Left LUQ Bulb 19 Fr. 1 day         Ileostomy 12/22/19 1704 Standard (Ewelina, end) LUQ 1 day         NG/OG Tube 12/22/19 1004 nasogastric 14 Fr. Right nostril 1 day         Urostomy 12/22/19 1654 ileal conduit RUQ 1 day                Physical Exam   Constitutional: He is oriented to person, place, and time. He appears well-developed and well-nourished.   HENT:   Head: Normocephalic and atraumatic.   NGT in place   Eyes: Conjunctivae are normal.   Neck: Normal range of motion.   Cardiovascular: Normal rate.    Pulmonary/Chest: Effort normal. No respiratory distress.   Abdominal: Soft. He exhibits no distension.   midline incision c/d/i; BE x 1 with SS output; ileostomy appears pink, healthy   Genitourinary:   Genitourinary Comments: Urostomy pink, healthy; red rubber and single-J stent in place, ileostomy appliance over urostomy filled with urine;    Musculoskeletal: Normal range of motion.   Neurological: He is alert and oriented to person, place, and time.   Skin: Skin is warm and dry.     Psychiatric: He has a normal mood and affect. His behavior is normal. Judgment and thought content normal.       Significant Labs:    BMP:  Recent Labs   Lab 12/22/19 2356 12/23/19 0259 12/24/19  0422   * 135* 139   K 4.0 4.1 3.8    100 102   CO2 20* 22* 27   BUN 15 16 21*   CREATININE 1.7* 1.8* 1.4   CALCIUM 7.2* 7.6* 8.8       CBC:   Recent Labs   Lab 12/22/19 2356 12/23/19  0259 12/24/19  0422   WBC 48.45* 49.07* 27.70*   HGB 10.7* 11.0* 10.5*   HCT 31.7* 33.4* 31.7*    179  132*       All pertinent labs results from the past 24 hours have been reviewed.    Significant Imaging:  All pertinent imaging results/findings from the past 24 hours have been reviewed.

## 2019-12-24 NOTE — PLAN OF CARE
POC is reviewed and understood by patient. Oriented x4. Advanced to a liquid diet. Pt tolerates it well. Sinus tach low 100s on telemetry. Receives Oxycodone q6h for pain. New meds ordered for blood pressure, nausea, etc that he normally would take at home. Has urostomy to gravity, and an ileostomy. NG tube removed. No signs of distress noted. Wife in room at all times. HOB elevated. WCTM.

## 2019-12-24 NOTE — PLAN OF CARE
Patient was independent & agile, prior to this illness. He lives alone in a camper, w/3 solid [facility], without railings.     Sister, Carisa, at his BS. Discussed w/them, the , together w/, roles-we follow him, while he is in the hospital, to assist w/discharge needs, should he have any. Currently: Discharge  Needs are TBD-he has a new ileostomy. They verbalized their understanding. [facility] Discharge Planning Packet given to patient's sister after informed about it;patient's sister verbalized her understanding.     He will [facility], w/3 BERE, w/railings: Address: [facility] [place], [address].       12/23/19 1350   Discharge Assessment   Assessment Type Discharge Planning Assessment   Confirmed/corrected address and phone number on facesheet? Yes   Assessment information obtained from? Patient;Medical Record;Other  (sister-Carisa)   Expected Length of Stay (days)   (TBD)   Communicated expected length of stay with patient/caregiver no  (Per MD)   Prior to hospitilization cognitive status: Unable to Assess   Prior to hospitalization functional status: Independent;Assistive Equipment;Needs Assistance   Current cognitive status: Alert/Oriented;No Deficits   Current Functional Status: Independent;Assistive Equipment;Needs Assistance   Facility Arrived From:   ([facility])   Lives With alone   Able to Return to Prior Arrangements yes   Is patient able to care for self after discharge? Yes   Who are your caregiver(s) and their phone number(s)?   (2 sisters available as he needs: 1. carisa wolfe Sister     [phone], 2. jazzy gan Sister     [phone]   )   Patient's perception of discharge disposition home or selfcare;home health  (TBD)   Readmission Within the Last 30 Days no previous admission in last 30 days   Patient currently being followed by outpatient case management? No   Patient currently receives any  other outside agency services? No   Equipment Currently Used at Home other (see comments)  (Urostomy, PAC)   Do you have any problems affording any of your prescribed medications? No   Is the patient taking medications as prescribed? yes   Does the patient have transportation home? Yes   Transportation Anticipated family or friend will provide   Dialysis Name and Scheduled days   (N/A)   Does the patient receive services at the [facility]? No   Discharge Plan A Home Health;Home with family   Discharge Plan B Home with family;Other  (OP services if no  Agency accepts)   DME Needed Upon Discharge  colostomy/ostomy supplies  (Per Wound care)   Patient/Family in Agreement with Plan yes  (Per MD)

## 2019-12-24 NOTE — ASSESSMENT & PLAN NOTE
55 yo M with hx metastatic prostate cancer s/p b/l orchiectomy, bladder cancer s/p cystoprostatectomy with ileal conduit, had intraoperative left ureteral injury during SBR; underwent reimplant on 12/22    - Continue BE drain for at least 1 week, and until output <100 cc/24h  - continue red rubber catheter in stoma x 3 weeks  - continue single J left ureteral stent x 6 weeks  - Wound Care consult for ostomy care  - strict I&O  - Ambulate TID  - low threshold to obtain BE creatinine  - remainder of care per primary

## 2019-12-24 NOTE — PROGRESS NOTES
[facility]  Urology  Progress Note    Patient Name: Kiko Gruber  MRN: [id number]  Admission Date: 12/22/2019  Hospital Length of Stay: 2 days  Code Status: Full Code   Attending Provider: Ck Herron MD   Primary Care Physician: Primary Doctor Lisy    Subjective:     HPI:  53 yo M with hx of bladder cancer s/p cystoprostatectomy with ileal conduit, also metastatic prostate CA s/p bilateral orchiectomy, who was undergoing ex-lap with SBR for internal hernia, had intraoperative left ureteral injury; underwent reimplant of left ureter in conduit    Interval History:   No acute events overnight  Up to chair yesterday  tolerating clear liquids  Drain output lower  Adequate UOP    Review of Systems  Objective:     Temp:  [98.1 °F (36.7 °C)-99.2 °F (37.3 °C)] 99 °F (37.2 °C)  Pulse:  [] 108  Resp:  [11-31] 18  SpO2:  [90 %-100 %] 92 %  BP: (106-156)/(66-89) 156/89     Body mass index is 25.55 kg/m².           Drains     Drain                 Closed/Suction Drain 12/22/19 1630 Left LUQ Bulb 19 Fr. 1 day         Ileostomy 12/22/19 1704 Standard ([place], end) LUQ 1 day         NG/OG Tube 12/22/19 1004 nasogastric 14 Fr. Right nostril 1 day         Urostomy 12/22/19 1654 ileal conduit RUQ 1 day                Physical Exam   Constitutional: He is oriented to person, place, and time. He appears well-developed and well-nourished.   HENT:   Head: Normocephalic and atraumatic.   NGT in place   Eyes: Conjunctivae are normal.   Neck: Normal range of motion.   Cardiovascular: Normal rate.    Pulmonary/Chest: Effort normal. No respiratory distress.   Abdominal: Soft. He exhibits no distension.   midline incision c/d/i; BE x 1 with SS output; ileostomy appears pink, healthy   Genitourinary:   Genitourinary Comments: Urostomy pink, healthy; red rubber and single-J stent in place, ileostomy appliance over urostomy filled with urine;    Musculoskeletal: Normal range of motion.   Neurological: He is alert and  oriented to person, place, and time.   Skin: Skin is warm and dry.     Psychiatric: He has a normal mood and affect. His behavior is normal. Judgment and thought content normal.       Significant Labs:    BMP:  Recent Labs   Lab 12/22/19 2356 12/23/19 0259 12/24/19  0422   * 135* 139   K 4.0 4.1 3.8    100 102   CO2 20* 22* 27   BUN 15 16 21*   CREATININE 1.7* 1.8* 1.4   CALCIUM 7.2* 7.6* 8.8       CBC:   Recent Labs   Lab 12/22/19 2356 12/23/19 0259 12/24/19  0422   WBC 48.45* 49.07* 27.70*   HGB 10.7* 11.0* 10.5*   HCT 31.7* 33.4* 31.7*    179 132*       All pertinent labs results from the past 24 hours have been reviewed.    Significant Imaging:  All pertinent imaging results/findings from the past 24 hours have been reviewed.                  Assessment/Plan:     Left ureteral injury  55 yo M with hx metastatic prostate cancer s/p b/l orchiectomy, bladder cancer s/p cystoprostatectomy with ileal conduit, had intraoperative left ureteral injury during SBR; underwent reimplant on 12/22    - Continue BE drain for at least 1 week, and until output <100 cc/24h  - continue red rubber catheter in stoma x 3 weeks  - continue single J left ureteral stent x 6 weeks  - Wound Care consult for ostomy care  - strict I&O  - Ambulate TID  - low threshold to obtain BE creatinine  - remainder of care per primary        VTE Risk Mitigation (From admission, onward)         Ordered     Place sequential compression device  Until discontinued      12/22/19 1800     IP VTE HIGH RISK PATIENT  Once      12/22/19 1800                Domingo Colindres MD  Urology  [facility]

## 2019-12-24 NOTE — SUBJECTIVE & OBJECTIVE
Interval History:     No major events overnight.  Vitals stable, some tachycardia, afebrile.  + ostomy function  NGT out      Medications:  Continuous Infusions:   dextrose 5 % and 0.45 % NaCl with KCl 20 mEq 75 mL/hr at 12/24/19 0608     Scheduled Meds:   acetaminophen  650 mg Oral Q8H    albuterol-ipratropium  3 mL Nebulization Q4H    famotidine (PF)  20 mg Intravenous Daily    nicotine  1 patch Transdermal Daily    piperacillin-tazobactam (ZOSYN) IVPB  4.5 g Intravenous Q8H     PRN Meds:ondansetron, oxyCODONE, oxyCODONE, promethazine (PHENERGAN) IVPB, sodium chloride 0.9%, sodium chloride 0.9%     Review of patient's allergies indicates:  No Known Allergies  Objective:     Vital Signs (Most Recent):  Temp: 98.6 °F (37 °C) (12/24/19 0808)  Pulse: 109 (12/24/19 0808)  Resp: 16 (12/24/19 0808)  BP: (!) 151/91 (12/24/19 0808)  SpO2: (!) 94 % (12/24/19 0808) Vital Signs (24h Range):  Temp:  [98.5 °F (36.9 °C)-99.2 °F (37.3 °C)] 98.6 °F (37 °C)  Pulse:  [] 109  Resp:  [11-28] 16  SpO2:  [90 %-100 %] 94 %  BP: (107-156)/(66-91) 151/91     Weight: 78.5 kg (172 lb 15.9 oz)  Body mass index is 25.55 kg/m².    Intake/Output - Last 3 Shifts       12/22 0700 - 12/23 0659 12/23 0700 - 12/24 0659 12/24 0700 - 12/25 0659    I.V. (mL/kg) 7986 (101.7) 2250 (28.7)     Blood 316      IV Piggyback 100 400     Total Intake(mL/kg) 8402 (107) 2650 (33.8)     Urine (mL/kg/hr) 1325 2525 (1.3)     Drains 1105 485     Stool 10 50     Total Output 2440 3060     Net +5962 -410            Stool Occurrence  0 x           Physical Exam     Constitutional: He appears well-developed and well-nourished. No distress.   HENT:   Head: Normocephalic and atraumatic.   Eyes: Pupils are equal, round. EOM are normal.   Neck: No thyromegaly present.   Cardiovascular: Non-tachycardic. intact distal pulses.   Pulmonary/Chest: Effort normal and breath sounds normal. No respiratory distress. He has no wheezes.   Abdominal: Soft. He exhibits no  distension and no mass.   Ileostomy site LLQ, contents in bag, mostly sweat.  Existing ureterostomy site RLQ, with red rubber catheter and stent, urine present.    Musculoskeletal: He exhibits no edema.   Neurological: No sensory deficit.   Skin: Skin is warm and dry. Capillary refill takes less than 2 seconds. He is not diaphoretic. No erythema.   Nursing note and vitals reviewed.    Significant Labs:  CBC:   Recent Labs   Lab 12/24/19  0422   WBC 27.70*   RBC 3.53*   HGB 10.5*   HCT 31.7*   *   MCV 90   MCH 29.7   MCHC 33.1     CMP:   Recent Labs   Lab 12/24/19  0422   GLU 98   CALCIUM 8.8   ALBUMIN 2.5*   PROT 5.6*      K 3.8   CO2 27      BUN 21*   CREATININE 1.4   ALKPHOS 84   ALT 8*   AST 22   BILITOT 0.3     Coagulation:   Recent Labs   Lab 12/22/19  1802   LABPROT 12.2   INR 1.2   APTT 28.6       Significant Diagnostics:  None in past 24 hours.

## 2019-12-24 NOTE — PLAN OF CARE
POC is reviewed and understood by patient. Oriented x4. NPO, with 1-2 ice chips per hour. Assist x1 to chair. Sinus tach low 100s on telemetry. Receives Dilaudid q6h PRN for pain. Has urostomy to gravity, and an ileostomy. No signs of distress noted. Wife in room at all times. HOB elevated.

## 2019-12-25 LAB
ALBUMIN SERPL BCP-MCNC: 2.6 G/DL (ref 3.5–5.2)
ALP SERPL-CCNC: 83 U/L (ref 55–135)
ALT SERPL W/O P-5'-P-CCNC: 9 U/L (ref 10–44)
ANION GAP SERPL CALC-SCNC: 15 MMOL/L (ref 8–16)
AST SERPL-CCNC: 16 U/L (ref 10–40)
BASOPHILS # BLD AUTO: 0.07 K/UL (ref 0–0.2)
BASOPHILS NFR BLD: 0.5 % (ref 0–1.9)
BILIRUB SERPL-MCNC: 0.5 MG/DL (ref 0.1–1)
BUN SERPL-MCNC: 19 MG/DL (ref 6–20)
CALCIUM SERPL-MCNC: 9.9 MG/DL (ref 8.7–10.5)
CHLORIDE SERPL-SCNC: 93 MMOL/L (ref 95–110)
CO2 SERPL-SCNC: 28 MMOL/L (ref 23–29)
CREAT SERPL-MCNC: 1.3 MG/DL (ref 0.5–1.4)
DIFFERENTIAL METHOD: ABNORMAL
EOSINOPHIL # BLD AUTO: 0 K/UL (ref 0–0.5)
EOSINOPHIL NFR BLD: 0.1 % (ref 0–8)
ERYTHROCYTE [DISTWIDTH] IN BLOOD BY AUTOMATED COUNT: 13.9 % (ref 11.5–14.5)
EST. GFR  (AFRICAN AMERICAN): >60 ML/MIN/1.73 M^2
EST. GFR  (NON AFRICAN AMERICAN): >60 ML/MIN/1.73 M^2
GLUCOSE SERPL-MCNC: 92 MG/DL (ref 70–110)
HCT VFR BLD AUTO: 35.9 % (ref 40–54)
HGB BLD-MCNC: 11.9 G/DL (ref 14–18)
IMM GRANULOCYTES # BLD AUTO: 0.23 K/UL (ref 0–0.04)
IMM GRANULOCYTES NFR BLD AUTO: 1.7 % (ref 0–0.5)
LYMPHOCYTES # BLD AUTO: 0.5 K/UL (ref 1–4.8)
LYMPHOCYTES NFR BLD: 3.6 % (ref 18–48)
MAGNESIUM SERPL-MCNC: 1.7 MG/DL (ref 1.6–2.6)
MCH RBC QN AUTO: 30.1 PG (ref 27–31)
MCHC RBC AUTO-ENTMCNC: 33.1 G/DL (ref 32–36)
MCV RBC AUTO: 91 FL (ref 82–98)
MONOCYTES # BLD AUTO: 0.5 K/UL (ref 0.3–1)
MONOCYTES NFR BLD: 4 % (ref 4–15)
NEUTROPHILS # BLD AUTO: 12.3 K/UL (ref 1.8–7.7)
NEUTROPHILS NFR BLD: 90.1 % (ref 38–73)
NRBC BLD-RTO: 0 /100 WBC
PHOSPHATE SERPL-MCNC: 4.5 MG/DL (ref 2.7–4.5)
PLATELET # BLD AUTO: 126 K/UL (ref 150–350)
PMV BLD AUTO: 9.9 FL (ref 9.2–12.9)
POTASSIUM SERPL-SCNC: 3.7 MMOL/L (ref 3.5–5.1)
PROT SERPL-MCNC: 6.6 G/DL (ref 6–8.4)
RBC # BLD AUTO: 3.95 M/UL (ref 4.6–6.2)
SODIUM SERPL-SCNC: 136 MMOL/L (ref 136–145)
WBC # BLD AUTO: 13.61 K/UL (ref 3.9–12.7)

## 2019-12-25 PROCEDURE — 63600175 PHARM REV CODE 636 W HCPCS: Performed by: STUDENT IN AN ORGANIZED HEALTH CARE EDUCATION/TRAINING PROGRAM

## 2019-12-25 PROCEDURE — 99900035 HC TECH TIME PER 15 MIN (STAT)

## 2019-12-25 PROCEDURE — 84100 ASSAY OF PHOSPHORUS: CPT

## 2019-12-25 PROCEDURE — S4991 NICOTINE PATCH NONLEGEND: HCPCS | Performed by: STUDENT IN AN ORGANIZED HEALTH CARE EDUCATION/TRAINING PROGRAM

## 2019-12-25 PROCEDURE — 25000003 PHARM REV CODE 250: Performed by: STUDENT IN AN ORGANIZED HEALTH CARE EDUCATION/TRAINING PROGRAM

## 2019-12-25 PROCEDURE — C9113 INJ PANTOPRAZOLE SODIUM, VIA: HCPCS | Performed by: STUDENT IN AN ORGANIZED HEALTH CARE EDUCATION/TRAINING PROGRAM

## 2019-12-25 PROCEDURE — 85025 COMPLETE CBC W/AUTO DIFF WBC: CPT

## 2019-12-25 PROCEDURE — 20600001 HC STEP DOWN PRIVATE ROOM

## 2019-12-25 PROCEDURE — 63600175 PHARM REV CODE 636 W HCPCS: Performed by: SURGERY

## 2019-12-25 PROCEDURE — 36415 COLL VENOUS BLD VENIPUNCTURE: CPT

## 2019-12-25 PROCEDURE — 83735 ASSAY OF MAGNESIUM: CPT

## 2019-12-25 PROCEDURE — 25000003 PHARM REV CODE 250: Performed by: SURGERY

## 2019-12-25 PROCEDURE — 80053 COMPREHEN METABOLIC PANEL: CPT

## 2019-12-25 PROCEDURE — 94761 N-INVAS EAR/PLS OXIMETRY MLT: CPT

## 2019-12-25 RX ORDER — POTASSIUM CHLORIDE 7.45 MG/ML
10 INJECTION INTRAVENOUS
Status: COMPLETED | OUTPATIENT
Start: 2019-12-25 | End: 2019-12-25

## 2019-12-25 RX ORDER — ONDANSETRON 2 MG/ML
8 INJECTION INTRAMUSCULAR; INTRAVENOUS ONCE
Status: COMPLETED | OUTPATIENT
Start: 2019-12-25 | End: 2019-12-25

## 2019-12-25 RX ORDER — PANTOPRAZOLE SODIUM 40 MG/10ML
40 INJECTION, POWDER, LYOPHILIZED, FOR SOLUTION INTRAVENOUS 2 TIMES DAILY
Status: DISCONTINUED | OUTPATIENT
Start: 2019-12-25 | End: 2020-01-01 | Stop reason: HOSPADM

## 2019-12-25 RX ORDER — MAGNESIUM SULFATE HEPTAHYDRATE 40 MG/ML
2 INJECTION, SOLUTION INTRAVENOUS ONCE
Status: COMPLETED | OUTPATIENT
Start: 2019-12-25 | End: 2019-12-25

## 2019-12-25 RX ORDER — HYDROMORPHONE HYDROCHLORIDE 1 MG/ML
0.2 INJECTION, SOLUTION INTRAMUSCULAR; INTRAVENOUS; SUBCUTANEOUS ONCE
Status: COMPLETED | OUTPATIENT
Start: 2019-12-25 | End: 2019-12-25

## 2019-12-25 RX ADMIN — HEPARIN SODIUM 5000 UNITS: 5000 INJECTION, SOLUTION INTRAVENOUS; SUBCUTANEOUS at 06:12

## 2019-12-25 RX ADMIN — MAGNESIUM SULFATE IN WATER 2 G: 40 INJECTION, SOLUTION INTRAVENOUS at 12:12

## 2019-12-25 RX ADMIN — POTASSIUM CHLORIDE 10 MEQ: 7.46 INJECTION, SOLUTION INTRAVENOUS at 01:12

## 2019-12-25 RX ADMIN — CALCIUM CARBONATE (ANTACID) CHEW TAB 500 MG 500 MG: 500 CHEW TAB at 02:12

## 2019-12-25 RX ADMIN — PROMETHAZINE HYDROCHLORIDE 6.25 MG: 25 INJECTION INTRAMUSCULAR; INTRAVENOUS at 04:12

## 2019-12-25 RX ADMIN — PIPERACILLIN AND TAZOBACTAM 4.5 G: 4; .5 INJECTION, POWDER, FOR SOLUTION INTRAVENOUS at 02:12

## 2019-12-25 RX ADMIN — POTASSIUM CHLORIDE 10 MEQ: 7.46 INJECTION, SOLUTION INTRAVENOUS at 11:12

## 2019-12-25 RX ADMIN — ONDANSETRON 8 MG: 2 INJECTION INTRAMUSCULAR; INTRAVENOUS at 02:12

## 2019-12-25 RX ADMIN — POTASSIUM CHLORIDE 10 MEQ: 7.46 INJECTION, SOLUTION INTRAVENOUS at 12:12

## 2019-12-25 RX ADMIN — HEPARIN SODIUM 5000 UNITS: 5000 INJECTION, SOLUTION INTRAVENOUS; SUBCUTANEOUS at 10:12

## 2019-12-25 RX ADMIN — HYDROMORPHONE HYDROCHLORIDE 0.2 MG: 1 INJECTION, SOLUTION INTRAMUSCULAR; INTRAVENOUS; SUBCUTANEOUS at 08:12

## 2019-12-25 RX ADMIN — PANTOPRAZOLE SODIUM 40 MG: 40 INJECTION, POWDER, FOR SOLUTION INTRAVENOUS at 08:12

## 2019-12-25 RX ADMIN — PANTOPRAZOLE SODIUM 40 MG: 40 INJECTION, POWDER, FOR SOLUTION INTRAVENOUS at 11:12

## 2019-12-25 RX ADMIN — Medication 1 PATCH: at 11:12

## 2019-12-25 RX ADMIN — MAGNESIUM SULFATE HEPTAHYDRATE 1 G: 500 INJECTION, SOLUTION INTRAMUSCULAR; INTRAVENOUS at 11:12

## 2019-12-25 RX ADMIN — PREDNISONE 5 MG: 5 TABLET ORAL at 08:12

## 2019-12-25 RX ADMIN — HYDROMORPHONE HYDROCHLORIDE 0.2 MG: 1 INJECTION, SOLUTION INTRAMUSCULAR; INTRAVENOUS; SUBCUTANEOUS at 09:12

## 2019-12-25 RX ADMIN — PREDNISONE 5 MG: 5 TABLET ORAL at 10:12

## 2019-12-25 RX ADMIN — ACETAMINOPHEN 650 MG: 325 TABLET ORAL at 10:12

## 2019-12-25 RX ADMIN — POTASSIUM CHLORIDE, DEXTROSE MONOHYDRATE AND SODIUM CHLORIDE: 150; 5; 450 INJECTION, SOLUTION INTRAVENOUS at 05:12

## 2019-12-25 RX ADMIN — LISINOPRIL 10 MG: 10 TABLET ORAL at 10:12

## 2019-12-25 RX ADMIN — SODIUM CHLORIDE 1000 ML: 0.9 INJECTION, SOLUTION INTRAVENOUS at 12:12

## 2019-12-25 RX ADMIN — HEPARIN SODIUM 5000 UNITS: 5000 INJECTION, SOLUTION INTRAVENOUS; SUBCUTANEOUS at 02:12

## 2019-12-25 RX ADMIN — PIPERACILLIN AND TAZOBACTAM 4.5 G: 4; .5 INJECTION, POWDER, FOR SOLUTION INTRAVENOUS at 06:12

## 2019-12-25 NOTE — SUBJECTIVE & OBJECTIVE
Interval History:   Experienced nausea and vomiting overnight after NG removed which was subsequently replaced.  Drain output down  UOP adequate    Review of Systems    Objective:     Temp:  [97.4 °F (36.3 °C)-98.9 °F (37.2 °C)] 98.9 °F (37.2 °C)  Pulse:  [] 111  Resp:  [12-20] 20  SpO2:  [93 %-96 %] 93 %  BP: (122-167)/() 167/103     Body mass index is 25.55 kg/m².    Date 12/25/19 0700 - 12/26/19 0659   Shift 1664-4930 5814-9795 9778-9373 24 Hour Total   INTAKE   Shift Total(mL/kg)       OUTPUT   Emesis/NG output 1200   1200   Drains 1000   1000   Shift Total(mL/kg) 2200(28)   2200(28)   Weight (kg) 78.5 78.5 78.5 78.5          Drains     Drain                 Closed/Suction Drain 12/22/19 1630 Left LUQ Bulb 19 Fr. 1 day         Ileostomy 12/22/19 1704 Standard ([place], end) LUQ 1 day         NG/OG Tube 12/22/19 1004 nasogastric 14 Fr. Right nostril 1 day         Urostomy 12/22/19 1654 ileal conduit RUQ 1 day                Physical Exam   Constitutional: He is oriented to person, place, and time. He appears well-developed and well-nourished.   HENT:   Head: Normocephalic and atraumatic.   NGT in place   Eyes: Conjunctivae are normal.   Neck: Normal range of motion.   Cardiovascular: Normal rate.    Pulmonary/Chest: Effort normal. No respiratory distress.   Abdominal: Soft. He exhibits no distension.   midline incision c/d/i; BE x 1 with SS output; ileostomy appears pink, healthy   Genitourinary:   Genitourinary Comments: Urostomy pink, healthy; red rubber and single-J stent in place, ileostomy appliance over urostomy filled with urine;    Musculoskeletal: Normal range of motion.   Neurological: He is alert and oriented to person, place, and time.   Skin: Skin is warm and dry.     Psychiatric: He has a normal mood and affect. His behavior is normal. Judgment and thought content normal.       Significant Labs:    BMP:  Recent Labs   Lab 12/23/19  0259 12/24/19  8282 12/25/19  0400   * 139 136   K  4.1 3.8 3.7    102 93*   CO2 22* 27 28   BUN 16 21* 19   CREATININE 1.8* 1.4 1.3   CALCIUM 7.6* 8.8 9.9       CBC:   Recent Labs   Lab 12/23/19  0259 12/24/19  0422 12/25/19  0400   WBC 49.07* 27.70* 13.61*   HGB 11.0* 10.5* 11.9*   HCT 33.4* 31.7* 35.9*    132* 126*       All pertinent labs results from the past 24 hours have been reviewed.    Significant Imaging:  All pertinent imaging results/findings from the past 24 hours have been reviewed.

## 2019-12-25 NOTE — PLAN OF CARE
Plan of care reviewed with patient who verbalized understanding. AAOx4. VSS on room air. Clear liquid diet. Pt complaining of multiple episodes of emesis. MD notified (see note). PRN Phenergan and Zofran given for nausea. Right urostomy tube patent and draining clear, yellow urine. Left ileostomy draining brown fluid. Left BE drain; draining serosanguinous fluid. Wife at the bedside. Patient sate up in the bed majority of the night. Safety precautions maintained throughout shift. Patient mostly slept in between care. No acute events this shift. WCTM.

## 2019-12-25 NOTE — SUBJECTIVE & OBJECTIVE
Interval History:   Was advanced to clears yesterday. Tolerated during the day then developed worsening nausea and multiple episodes of emesis overnight. Replaced the NG tube on rounds today. Has had 1200 cc emesis/NG output.     Medications:  Continuous Infusions:   dextrose 5 % and 0.45 % NaCl with KCl 20 mEq 75 mL/hr at 12/24/19 0608     Scheduled Meds:   acetaminophen  650 mg Oral Q8H    albuterol-ipratropium  3 mL Nebulization Q4H    heparin (porcine)  5,000 Units Subcutaneous Q8H    lisinopril  10 mg Oral Daily    magnesium sulfate IVPB  1 g Intravenous Once    nicotine  1 patch Transdermal Daily    pantoprazole  40 mg Intravenous BID    piperacillin-tazobactam (ZOSYN) IVPB  4.5 g Intravenous Q8H    potassium chloride in water  10 mEq Intravenous Q1H    predniSONE  5 mg Oral BID     PRN Meds:calcium carbonate, ondansetron, oxyCODONE, oxyCODONE, promethazine (PHENERGAN) IVPB, sodium chloride 0.9%, sodium chloride 0.9%     Review of patient's allergies indicates:  No Known Allergies  Objective:     Vital Signs (Most Recent):  Temp: 98.9 °F (37.2 °C) (12/25/19 0340)  Pulse: 109 (12/25/19 1141)  Resp: 20 (12/25/19 0739)  BP: (!) 167/103 (12/25/19 0340)  SpO2: 95 % (12/25/19 1136) Vital Signs (24h Range):  Temp:  [97.4 °F (36.3 °C)-98.9 °F (37.2 °C)] 98.9 °F (37.2 °C)  Pulse:  [] 109  Resp:  [12-20] 20  SpO2:  [93 %-96 %] 95 %  BP: (122-167)/() 167/103     Weight: 78.5 kg (172 lb 15.9 oz)  Body mass index is 25.55 kg/m².    Intake/Output - Last 3 Shifts       12/23 0700 - 12/24 0659 12/24 0700 - 12/25 0659 12/25 0700 - 12/26 0659    P.O.  1000     I.V. (mL/kg) 2250 (28.7) 796.3 (10.1)     Blood       IV Piggyback 400 200     Total Intake(mL/kg) 2650 (33.8) 1996.3 (25.4)     Urine (mL/kg/hr) 2525 (1.3) 900 (0.5) 360 (0.9)    Emesis/NG output   1200    Drains 680 58 6985    Stool 50 875 150    Total Output 3060 1855 3910    Net -410 +141.3 -3910           Stool Occurrence 0 x 0 x            Physical Exam     Constitutional: He appears well-developed and well-nourished. No distress.   HENT:   Head: Normocephalic and atraumatic.   Eyes: Pupils are equal, round. EOM are normal.   Neck: No thyromegaly present.   Cardiovascular: Non-tachycardic. intact distal pulses.   Pulmonary/Chest: Effort normal and breath sounds normal. No respiratory distress. He has no wheezes.   Abdominal: soft but distended, no bowel sounds, appropriately tender.  Ileostomy site LLQ, dark green liquid in ostomy bag.  Existing ureterostomy site RLQ, with red rubber catheter and stent, urine present.    Musculoskeletal: He exhibits no edema.   Neurological: No sensory deficit.   Skin: Skin is warm and dry. Capillary refill takes less than 2 seconds. He is not diaphoretic. No erythema.   Nursing note and vitals reviewed.    Significant Labs:  CBC:   Recent Labs   Lab 12/25/19  0400   WBC 13.61*   RBC 3.95*   HGB 11.9*   HCT 35.9*   *   MCV 91   MCH 30.1   MCHC 33.1     CMP:   Recent Labs   Lab 12/25/19  0400   GLU 92   CALCIUM 9.9   ALBUMIN 2.6*   PROT 6.6      K 3.7   CO2 28   CL 93*   BUN 19   CREATININE 1.3   ALKPHOS 83   ALT 9*   AST 16   BILITOT 0.5     Coagulation:   Recent Labs   Lab 12/22/19  1802   LABPROT 12.2   INR 1.2   APTT 28.6       Significant Diagnostics:  KUB with air in small bowel and stomach distension

## 2019-12-25 NOTE — PT/OT/SLP PROGRESS
Physical Therapy      Patient Name:  Kiko Gruber   MRN:  [id number]    Patient not seen today secondary to refusal. Has NG tube and awaiting xray  . Will follow-up tomorrow..    Lorie Segura, PT

## 2019-12-25 NOTE — ASSESSMENT & PLAN NOTE
55 yo M with hx metastatic prostate cancer s/p b/l orchiectomy, bladder cancer s/p cystoprostatectomy with ileal conduit, had intraoperative left ureteral injury during SBR; underwent reimplant on 12/22    - Management of ileus per general surgery  - Continue BE drain for at least 1 week, and until output <100 cc/24h  - continue red rubber catheter in stoma x 3 weeks  - continue single J left ureteral stent x 6 weeks  - Wound Care consult for ostomy care  - strict I&O  - Ambulate TID  - low threshold to obtain BE creatinine  - remainder of care per primary

## 2019-12-25 NOTE — PROGRESS NOTES
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 Also on prednisone as part of chemo. Getting Neupogen.  Pt states he ate last night around 10p and just after that noted severe abdominal pain and nausea- had multiple episodes of emesis NBNB. Tried his anti-nausea medication but did not help so he went to ED for evaluation. Last BM was yesterday. No fevers at home. Pain has not improved. At OSH had CTabd/pelvis which revealed high grade sbo concerning for internal hernia with closed loop as well as moderate left hydronephrosis and significant perinephric edema with focal narrowing of L ureter as it enters possible internal hernia. WBC at OSH 35.5 and sodium 125 lactate 2.3.    Post-Op Info:  Procedure(s) (LRB):  LAPAROTOMY, EXPLORATORY with  Small bowel resection and Ileostomy creation. (N/A)  URETERONEOCYSTOSTOMY reimplant to conduit (Left)  EXCISION, SMALL INTESTINE   3 Days Post-Op     Interval History:   Was advanced to clears yesterday. Tolerated during the day then developed worsening nausea and multiple episodes of emesis overnight. Replaced the NG tube on rounds today. Has had 1200 cc emesis/NG output.     Medications:  Continuous Infusions:   dextrose 5 % and 0.45 % NaCl with KCl 20 mEq 75 mL/hr at 12/24/19 0608     Scheduled Meds:   acetaminophen  650 mg Oral Q8H    albuterol-ipratropium  3 mL Nebulization Q4H    heparin (porcine)  5,000 Units Subcutaneous Q8H    lisinopril  10 mg Oral Daily    magnesium sulfate IVPB  1 g Intravenous Once    nicotine  1 patch Transdermal Daily    pantoprazole  40 mg Intravenous BID    piperacillin-tazobactam (ZOSYN) IVPB  4.5 g Intravenous Q8H    potassium  chloride in water  10 mEq Intravenous Q1H    predniSONE  5 mg Oral BID     PRN Meds:calcium carbonate, ondansetron, oxyCODONE, oxyCODONE, promethazine (PHENERGAN) IVPB, sodium chloride 0.9%, sodium chloride 0.9%     Review of patient's allergies indicates:  No Known Allergies  Objective:     Vital Signs (Most Recent):  Temp: 98.9 °F (37.2 °C) (12/25/19 0340)  Pulse: 109 (12/25/19 1141)  Resp: 20 (12/25/19 0739)  BP: (!) 167/103 (12/25/19 0340)  SpO2: 95 % (12/25/19 1136) Vital Signs (24h Range):  Temp:  [97.4 °F (36.3 °C)-98.9 °F (37.2 °C)] 98.9 °F (37.2 °C)  Pulse:  [] 109  Resp:  [12-20] 20  SpO2:  [93 %-96 %] 95 %  BP: (122-167)/() 167/103     Weight: 78.5 kg (172 lb 15.9 oz)  Body mass index is 25.55 kg/m².    Intake/Output - Last 3 Shifts       12/23 0700 - 12/24 0659 12/24 0700 - 12/25 0659 12/25 0700 - 12/26 0659    P.O.  1000     I.V. (mL/kg) 2250 (28.7) 796.3 (10.1)     Blood       IV Piggyback 400 200     Total Intake(mL/kg) 2650 (33.8) 1996.3 (25.4)     Urine (mL/kg/hr) 2525 (1.3) 900 (0.5) 360 (0.9)    Emesis/NG output   1200    Drains 314 73 0273    Stool 50 875 150    Total Output 3060 1855 3910    Net -410 +141.3 -3910           Stool Occurrence 0 x 0 x           Physical Exam     Constitutional: He appears well-developed and well-nourished. No distress.   HENT:   Head: Normocephalic and atraumatic.   Eyes: Pupils are equal, round. EOM are normal.   Neck: No thyromegaly present.   Cardiovascular: Non-tachycardic. intact distal pulses.   Pulmonary/Chest: Effort normal and breath sounds normal. No respiratory distress. He has no wheezes.   Abdominal: soft but distended, no bowel sounds, appropriately tender.  Ileostomy site LLQ, dark green liquid in ostomy bag.  Existing ureterostomy site RLQ, with red rubber catheter and stent, urine present.    Musculoskeletal: He exhibits no edema.   Neurological: No sensory deficit.   Skin: Skin is warm and dry. Capillary refill takes less than 2  seconds. He is not diaphoretic. No erythema.   Nursing note and vitals reviewed.    Significant Labs:  CBC:   Recent Labs   Lab 12/25/19  0400   WBC 13.61*   RBC 3.95*   HGB 11.9*   HCT 35.9*   *   MCV 91   MCH 30.1   MCHC 33.1     CMP:   Recent Labs   Lab 12/25/19  0400   GLU 92   CALCIUM 9.9   ALBUMIN 2.6*   PROT 6.6      K 3.7   CO2 28   CL 93*   BUN 19   CREATININE 1.3   ALKPHOS 83   ALT 9*   AST 16   BILITOT 0.5     Coagulation:   Recent Labs   Lab 12/22/19  1802   LABPROT 12.2   INR 1.2   APTT 28.6       Significant Diagnostics:  KUB with air in small bowel and stomach distension    Assessment/Plan:     Abdominal pain  55yo M with metastatic prostate cancer s/p radical cystectomy with urostomy on chemo with closed loop obstruction. Now s/p ex-lap 12/22 with resection of segment of necrotic bowel. Iatrogenic L ureter injury now s/p reimplantation    - NG tube to LIWS  - mIVF  - replete lytes  - 1L NS bolus  - BE management per urology  - Cont broad spectrum ABX  - PRN pain/nausea control PO  - F/u home med list (wife is calling pharmacy) will restart as appropriate  - GI ppx, DVT ppx  - PT/OT        Delfina Lawson MD  General Surgery  [facility]

## 2019-12-25 NOTE — PROGRESS NOTES
[facility]  Urology  Progress Note    Patient Name: Kiko Gruber  MRN: [id number]  Admission Date: 12/22/2019  Hospital Length of Stay: 3 days  Code Status: Full Code   Attending Provider: Ck Herron MD   Primary Care Physician: Lala Juarez MD    Subjective:     HPI:  55 yo M with hx of bladder cancer s/p cystoprostatectomy with ileal conduit, also metastatic prostate CA s/p bilateral orchiectomy, who was undergoing ex-lap with SBR for internal hernia, had intraoperative left ureteral injury; underwent reimplant of left ureter in conduit    Interval History:   Experienced nausea and vomiting overnight after NG removed which was subsequently replaced.  Drain output down  UOP adequate    Review of Systems    Objective:     Temp:  [97.4 °F (36.3 °C)-98.9 °F (37.2 °C)] 98.9 °F (37.2 °C)  Pulse:  [] 111  Resp:  [12-20] 20  SpO2:  [93 %-96 %] 93 %  BP: (122-167)/() 167/103     Body mass index is 25.55 kg/m².    Date 12/25/19 0700 - 12/26/19 0659   Shift 5782-8594 0788-9828 1517-5768 24 Hour Total   INTAKE   Shift Total(mL/kg)       OUTPUT   Emesis/NG output 1200   1200   Drains 1000   1000   Shift Total(mL/kg) 2200(28)   2200(28)   Weight (kg) 78.5 78.5 78.5 78.5          Drains     Drain                 Closed/Suction Drain 12/22/19 1630 Left LUQ Bulb 19 Fr. 1 day         Ileostomy 12/22/19 1704 Standard ([place], end) LUQ 1 day         NG/OG Tube 12/22/19 1004 nasogastric 14 Fr. Right nostril 1 day         Urostomy 12/22/19 1654 ileal conduit RUQ 1 day                Physical Exam   Constitutional: He is oriented to person, place, and time. He appears well-developed and well-nourished.   HENT:   Head: Normocephalic and atraumatic.   NGT in place   Eyes: Conjunctivae are normal.   Neck: Normal range of motion.   Cardiovascular: Normal rate.    Pulmonary/Chest: Effort normal. No respiratory distress.   Abdominal: Soft. He exhibits no distension.   midline incision c/d/i; BE  x 1 with SS output; ileostomy appears pink, healthy   Genitourinary:   Genitourinary Comments: Urostomy pink, healthy; red rubber and single-J stent in place, ileostomy appliance over urostomy filled with urine;    Musculoskeletal: Normal range of motion.   Neurological: He is alert and oriented to person, place, and time.   Skin: Skin is warm and dry.     Psychiatric: He has a normal mood and affect. His behavior is normal. Judgment and thought content normal.       Significant Labs:    BMP:  Recent Labs   Lab 12/23/19 0259 12/24/19  0422 12/25/19  0400   * 139 136   K 4.1 3.8 3.7    102 93*   CO2 22* 27 28   BUN 16 21* 19   CREATININE 1.8* 1.4 1.3   CALCIUM 7.6* 8.8 9.9       CBC:   Recent Labs   Lab 12/23/19 0259 12/24/19 0422 12/25/19  0400   WBC 49.07* 27.70* 13.61*   HGB 11.0* 10.5* 11.9*   HCT 33.4* 31.7* 35.9*    132* 126*       All pertinent labs results from the past 24 hours have been reviewed.    Significant Imaging:  All pertinent imaging results/findings from the past 24 hours have been reviewed.                  Assessment/Plan:     Left ureteral injury  53 yo M with hx metastatic prostate cancer s/p b/l orchiectomy, bladder cancer s/p cystoprostatectomy with ileal conduit, had intraoperative left ureteral injury during SBR; underwent reimplant on 12/22    - Management of ileus per general surgery  - Continue BE drain for at least 1 week, and until output <100 cc/24h  - continue red rubber catheter in stoma x 3 weeks  - continue single J left ureteral stent x 6 weeks  - Wound Care consult for ostomy care  - strict I&O  - Ambulate TID  - low threshold to obtain BE creatinine  - remainder of care per primary        VTE Risk Mitigation (From admission, onward)         Ordered     heparin (porcine) injection 5,000 Units  Every 8 hours      12/24/19 0923     Place sequential compression device  Until discontinued      12/22/19 1800     IP VTE HIGH RISK PATIENT  Once      12/22/19 1800                 Gokul Granados MD  Urology  [facility]

## 2019-12-25 NOTE — ASSESSMENT & PLAN NOTE
53yo M with metastatic prostate cancer s/p radical cystectomy with urostomy on chemo with closed loop obstruction. Now s/p ex-lap 12/22 with resection of segment of necrotic bowel. Iatrogenic L ureter injury now s/p reimplantation    - NG tube to LIWS  - mIVF  - replete lytes  - 1L NS bolus  - BE management per urology  - Cont broad spectrum ABX  - PRN pain/nausea control PO  - F/u home med list (wife is calling pharmacy) will restart as appropriate  - GI ppx, DVT ppx  - PT/OT

## 2019-12-25 NOTE — PT/OT/SLP PROGRESS
Occupational Therapy      Patient Name:  Kiko Gruber   MRN:  [id number]    Patient not seen today secondary to Patient unwilling to participate. Writing therapist attempted pt in AM, but pt refused 2/2 fatigue and agitation regarding situation. Will follow-up as scheduled.    Moose Patterson, OT  12/25/2019

## 2019-12-26 LAB
ALBUMIN SERPL BCP-MCNC: 2.6 G/DL (ref 3.5–5.2)
ALP SERPL-CCNC: 74 U/L (ref 55–135)
ALT SERPL W/O P-5'-P-CCNC: 9 U/L (ref 10–44)
ANION GAP SERPL CALC-SCNC: 14 MMOL/L (ref 8–16)
AST SERPL-CCNC: 13 U/L (ref 10–40)
BASOPHILS # BLD AUTO: 0.08 K/UL (ref 0–0.2)
BASOPHILS NFR BLD: 0.4 % (ref 0–1.9)
BILIRUB SERPL-MCNC: 0.5 MG/DL (ref 0.1–1)
BLD PROD TYP BPU: NORMAL
BLD PROD TYP BPU: NORMAL
BLOOD UNIT EXPIRATION DATE: NORMAL
BLOOD UNIT EXPIRATION DATE: NORMAL
BLOOD UNIT TYPE CODE: 7300
BLOOD UNIT TYPE CODE: 7300
BLOOD UNIT TYPE: NORMAL
BLOOD UNIT TYPE: NORMAL
BUN SERPL-MCNC: 28 MG/DL (ref 6–20)
CALCIUM SERPL-MCNC: 9.6 MG/DL (ref 8.7–10.5)
CHLORIDE SERPL-SCNC: 92 MMOL/L (ref 95–110)
CO2 SERPL-SCNC: 32 MMOL/L (ref 23–29)
CODING SYSTEM: NORMAL
CODING SYSTEM: NORMAL
CREAT SERPL-MCNC: 1.4 MG/DL (ref 0.5–1.4)
DIFFERENTIAL METHOD: ABNORMAL
DISPENSE STATUS: NORMAL
DISPENSE STATUS: NORMAL
EOSINOPHIL # BLD AUTO: 0 K/UL (ref 0–0.5)
EOSINOPHIL NFR BLD: 0.1 % (ref 0–8)
ERYTHROCYTE [DISTWIDTH] IN BLOOD BY AUTOMATED COUNT: 13.8 % (ref 11.5–14.5)
EST. GFR  (AFRICAN AMERICAN): >60 ML/MIN/1.73 M^2
EST. GFR  (NON AFRICAN AMERICAN): 56.2 ML/MIN/1.73 M^2
GLUCOSE SERPL-MCNC: 95 MG/DL (ref 70–110)
HCT VFR BLD AUTO: 39.3 % (ref 40–54)
HGB BLD-MCNC: 13 G/DL (ref 14–18)
IMM GRANULOCYTES # BLD AUTO: 0.18 K/UL (ref 0–0.04)
IMM GRANULOCYTES NFR BLD AUTO: 1 % (ref 0–0.5)
LYMPHOCYTES # BLD AUTO: 0.7 K/UL (ref 1–4.8)
LYMPHOCYTES NFR BLD: 3.9 % (ref 18–48)
MAGNESIUM SERPL-MCNC: 1.9 MG/DL (ref 1.6–2.6)
MCH RBC QN AUTO: 30.2 PG (ref 27–31)
MCHC RBC AUTO-ENTMCNC: 33.1 G/DL (ref 32–36)
MCV RBC AUTO: 91 FL (ref 82–98)
MONOCYTES # BLD AUTO: 1.2 K/UL (ref 0.3–1)
MONOCYTES NFR BLD: 6.4 % (ref 4–15)
NEUTROPHILS # BLD AUTO: 16.6 K/UL (ref 1.8–7.7)
NEUTROPHILS NFR BLD: 88.2 % (ref 38–73)
NRBC BLD-RTO: 0 /100 WBC
PHOSPHATE SERPL-MCNC: 4.7 MG/DL (ref 2.7–4.5)
PLATELET # BLD AUTO: 145 K/UL (ref 150–350)
PMV BLD AUTO: 10.4 FL (ref 9.2–12.9)
POTASSIUM SERPL-SCNC: 4.5 MMOL/L (ref 3.5–5.1)
PROT SERPL-MCNC: 6.9 G/DL (ref 6–8.4)
RBC # BLD AUTO: 4.31 M/UL (ref 4.6–6.2)
SODIUM SERPL-SCNC: 138 MMOL/L (ref 136–145)
TRANS ERYTHROCYTES VOL PATIENT: NORMAL ML
TRANS ERYTHROCYTES VOL PATIENT: NORMAL ML
WBC # BLD AUTO: 18.86 K/UL (ref 3.9–12.7)

## 2019-12-26 PROCEDURE — 63600175 PHARM REV CODE 636 W HCPCS: Performed by: STUDENT IN AN ORGANIZED HEALTH CARE EDUCATION/TRAINING PROGRAM

## 2019-12-26 PROCEDURE — 63600175 PHARM REV CODE 636 W HCPCS: Performed by: SURGERY

## 2019-12-26 PROCEDURE — 25000003 PHARM REV CODE 250: Performed by: STUDENT IN AN ORGANIZED HEALTH CARE EDUCATION/TRAINING PROGRAM

## 2019-12-26 PROCEDURE — 20600001 HC STEP DOWN PRIVATE ROOM

## 2019-12-26 PROCEDURE — 25000003 PHARM REV CODE 250: Performed by: SURGERY

## 2019-12-26 PROCEDURE — 85025 COMPLETE CBC W/AUTO DIFF WBC: CPT

## 2019-12-26 PROCEDURE — 99900035 HC TECH TIME PER 15 MIN (STAT)

## 2019-12-26 PROCEDURE — 80053 COMPREHEN METABOLIC PANEL: CPT

## 2019-12-26 PROCEDURE — 36415 COLL VENOUS BLD VENIPUNCTURE: CPT

## 2019-12-26 PROCEDURE — 94761 N-INVAS EAR/PLS OXIMETRY MLT: CPT

## 2019-12-26 PROCEDURE — 83735 ASSAY OF MAGNESIUM: CPT

## 2019-12-26 PROCEDURE — 84100 ASSAY OF PHOSPHORUS: CPT

## 2019-12-26 PROCEDURE — C9113 INJ PANTOPRAZOLE SODIUM, VIA: HCPCS | Performed by: STUDENT IN AN ORGANIZED HEALTH CARE EDUCATION/TRAINING PROGRAM

## 2019-12-26 PROCEDURE — S4991 NICOTINE PATCH NONLEGEND: HCPCS | Performed by: STUDENT IN AN ORGANIZED HEALTH CARE EDUCATION/TRAINING PROGRAM

## 2019-12-26 RX ORDER — DIPHENHYDRAMINE HYDROCHLORIDE 50 MG/ML
25 INJECTION INTRAMUSCULAR; INTRAVENOUS ONCE
Status: COMPLETED | OUTPATIENT
Start: 2019-12-26 | End: 2019-12-26

## 2019-12-26 RX ADMIN — HEPARIN SODIUM 5000 UNITS: 5000 INJECTION, SOLUTION INTRAVENOUS; SUBCUTANEOUS at 09:12

## 2019-12-26 RX ADMIN — SODIUM CHLORIDE, SODIUM LACTATE, POTASSIUM CHLORIDE, AND CALCIUM CHLORIDE 1000 ML: .6; .31; .03; .02 INJECTION, SOLUTION INTRAVENOUS at 03:12

## 2019-12-26 RX ADMIN — LISINOPRIL 10 MG: 10 TABLET ORAL at 09:12

## 2019-12-26 RX ADMIN — ACETAMINOPHEN 650 MG: 325 TABLET ORAL at 03:12

## 2019-12-26 RX ADMIN — HEPARIN SODIUM 5000 UNITS: 5000 INJECTION, SOLUTION INTRAVENOUS; SUBCUTANEOUS at 05:12

## 2019-12-26 RX ADMIN — Medication 1 PATCH: at 09:12

## 2019-12-26 RX ADMIN — DIPHENHYDRAMINE HYDROCHLORIDE 25 MG: 50 INJECTION, SOLUTION INTRAMUSCULAR; INTRAVENOUS at 09:12

## 2019-12-26 RX ADMIN — PANTOPRAZOLE SODIUM 40 MG: 40 INJECTION, POWDER, FOR SOLUTION INTRAVENOUS at 09:12

## 2019-12-26 RX ADMIN — PREDNISONE 5 MG: 5 TABLET ORAL at 09:12

## 2019-12-26 RX ADMIN — PIPERACILLIN AND TAZOBACTAM 4.5 G: 4; .5 INJECTION, POWDER, FOR SOLUTION INTRAVENOUS at 11:12

## 2019-12-26 RX ADMIN — ACETAMINOPHEN 650 MG: 325 TABLET ORAL at 09:12

## 2019-12-26 RX ADMIN — SODIUM CHLORIDE, SODIUM LACTATE, POTASSIUM CHLORIDE, AND CALCIUM CHLORIDE 1000 ML: .6; .31; .03; .02 INJECTION, SOLUTION INTRAVENOUS at 06:12

## 2019-12-26 RX ADMIN — PIPERACILLIN AND TAZOBACTAM 4.5 G: 4; .5 INJECTION, POWDER, FOR SOLUTION INTRAVENOUS at 12:12

## 2019-12-26 RX ADMIN — PIPERACILLIN AND TAZOBACTAM 4.5 G: 4; .5 INJECTION, POWDER, FOR SOLUTION INTRAVENOUS at 09:12

## 2019-12-26 RX ADMIN — ACETAMINOPHEN 650 MG: 325 TABLET ORAL at 05:12

## 2019-12-26 RX ADMIN — HEPARIN SODIUM 5000 UNITS: 5000 INJECTION, SOLUTION INTRAVENOUS; SUBCUTANEOUS at 03:12

## 2019-12-26 RX ADMIN — PIPERACILLIN AND TAZOBACTAM 4.5 G: 4; .5 INJECTION, POWDER, FOR SOLUTION INTRAVENOUS at 05:12

## 2019-12-26 NOTE — PLAN OF CARE
[facility]    HOME HEALTH ORDERS  FACE TO FACE ENCOUNTER    Patient Name: Kiko Gruber  YOB: 1965    PCP: Lala Juarez MD   [address] Address[address] [address] / [place] [address]  PCP Phone Number: [phone]  PCP Fax: [phone]    Encounter Date: 12/31/2019    Admit to Home Health    Diagnoses:  Active Hospital Problems    Diagnosis  POA    *SBO (small bowel obstruction) [K56.609]  Yes    Nicotine dependence [F17.200]  Yes    Hypertension [I10]  Yes    Hypomagnesemia [E83.42]  No    Hyponatremia [E87.1]  No    Left ureteral injury [S37.10XA]  No    Abdominal pain [R10.9]  Yes      Resolved Hospital Problems   No resolved problems to display.       No future appointments.        I have seen and examined this patient face to face today. My clinical findings that support the need for the home health skilled services and home bound status are the following:  Medical restrictions requiring assistance of another human to leave home due to  Post surgery monitoring.    Allergies:Review of patient's allergies indicates:  No Known Allergies    Diet: regular diet    Activities: no heavy lifting, nothing heavier than 10lbs    Nursing:   SN to complete comprehensive assessment including routine vital signs. Instruct on disease process and s/s of complications to report to MD. Review/verify medication list sent home with the patient at time of discharge  and instruct patient/caregiver as needed. Frequency may be adjusted depending on start of care date.    Notify MD if SBP > 160 or < 90; DBP > 90 or < 50; HR > 120 or < 50; Temp > 101      CONSULTS:    Physical Therapy to evaluate and treat. Evaluate for home safety and equipment needs; Establish/upgrade home exercise program. Perform / instruct on therapeutic exercises, gait training, transfer training, and Range of Motion.  Occupational Therapy to evaluate and treat. Evaluate home environment for safety and  equipment needs. Perform/Instruct on transfers, ADL training, ROM, and therapeutic exercises.  Aide to provide assistance with personal care, ADLs, and vital signs.    MISCELLANEOUS CARE:  n/a    WOUND CARE ORDERS  Ostomy and urostomy in place  Ostomy nurse follow up.      Full ostomy lesson with patient and wife.      Patient states he is very familiar with the process of pouching. He has had a urostomy for several years. He orders his yared pouches pre-cut. Discussed that it is recommended to wait approx 1 month to allow stoma to shrink to size.      Pouch removed and area cleansed with warm water. Pouch cut to 35mm. Stoma pink and well budded, approx 75 ml of thin stool emptied from pouch.  Peristomal skin intact. Skin prep and new pouch applied.      Reviewed I and O flow sheet. Stressed importance of hydration and monitoring outputs. Patient and wife verbalize understanding.      Patient practiced opening and closing the pouch as did wife. Patient appears confident with good support system.      Will order starter kits for patient with samples of 2 piece appliances.    Extra urostomy and ileostomy supplies in patients purple bag.   Patient given contact info should he have questions.     Will follow PRN.  Blanquita Dbuois RN [facility]   [phone]    12/30  Ileostomy and urostomy pouch changed. Stent and red rubber remain in urostomy.   Mild peristomal excoriation to the ileostomy site. Skin prep applied.      Patient denies any further questions in regards to ostomy teaching.      Stapled midline cleansed and painted with betadine. Incision site well approximated with old bloody drainage note staple sites.      Wound care to follow PRN.  [facility]quita [facility]   [phone]    Medications: Review discharge medications with patient and family and provide education.      There are no discharge medications for this patient.      I certify that this patient is confined to his home and needs intermittent skilled  nursing care, physical therapy and occupational therapy.

## 2019-12-26 NOTE — PLAN OF CARE
Visited patient. AAOX4. SUKHJINDER BERRIOS. Patient states he took shower today, and this is why he was not able to partake w/therapy today. CM asked him to try to work w/them next time, as we are planning for his discharge, & need to see if any services or home DME is needed. He verbalized his understanding. He has an old urostomy & cares for it, & new ileostomy. CM asked him if he would like HH, since he has a new ileostomy, so he can have a nurse to follow-up on him at home? He does want HH. Informed him the SW will send HH referrals, in hopes a HH Agency will accept/Barrier: Medicaid insurer (Difficult to obtain an accepting HH Agency when pts have Medicaid insurance.) & have this information on his discharge paper work, w/initial visit being day after discharge, should one accept vs he will have to go to OP Wound care nurse. He verbalized his understanding.     Updated SW, ELLEN Rudd, on above.

## 2019-12-26 NOTE — ASSESSMENT & PLAN NOTE
55yo M with metastatic prostate cancer s/p radical cystectomy with urostomy on chemo with closed loop obstruction. Now s/p ex-lap 12/22 with resection of segment of necrotic bowel. Iatrogenic L ureter injury now s/p reimplantation    - NPO, OK for sips of water/ice chips twice an hour  - NG tube to LIWS, keep today  - mIVF  - replete lytes  - BE management per urology  - Strict I/O  - Cont broad spectrum ABX  - PRN pain/nausea control PO  - F/u home med list (wife is calling pharmacy) will restart as appropriate  - GI ppx, DVT ppx  - PT/OT    Dispo: Continue NGT and replace fluid loss as it had high output in past 24 hours.

## 2019-12-26 NOTE — PT/OT/SLP PROGRESS
Occupational Therapy      Patient Name:  Kiko Gruber   MRN:  [id number]    Patient not seen today secondary to Patient unwilling to participate. Pt waiting for nurse to get in shower and empty ostomy bag. OT unable to make PM attempt. Will follow-up as scheduled.    Sailaja Tabares OT  12/26/2019

## 2019-12-26 NOTE — PROGRESS NOTES
Ostomy nurse follow up.      Patient had NGT re-inserted yesterday. Patient states he feels weak. He has had increasing output from the ileostomy. Will inform surgeon of high output.    Both pouches are intact. Will plan to change both tomorrow.     Sister at bedside, questions answered.     Patient denies any further questions at this time.   Blanquita Dubois RN CN [facility]   [phone]

## 2019-12-26 NOTE — SUBJECTIVE & OBJECTIVE
Interval History:     No major events overnight.  Afebrile, vitals stable but he is mildly tachycardic.  NGT with 5.6L in past 24 hours.  Vomited yesterday.   WBC elevated to 18 from 13  Ostomy with thin output     Medications:  Continuous Infusions:   dextrose 5 % and 0.45 % NaCl with KCl 20 mEq 125 mL/hr at 12/25/19 1745     Scheduled Meds:   acetaminophen  650 mg Oral Q8H    albuterol-ipratropium  3 mL Nebulization Q4H    heparin (porcine)  5,000 Units Subcutaneous Q8H    lisinopril  10 mg Oral Daily    nicotine  1 patch Transdermal Daily    pantoprazole  40 mg Intravenous BID    piperacillin-tazobactam (ZOSYN) IVPB  4.5 g Intravenous Q8H    predniSONE  5 mg Oral BID     PRN Meds:calcium carbonate, ondansetron, oxyCODONE, oxyCODONE, promethazine (PHENERGAN) IVPB, sodium chloride 0.9%, sodium chloride 0.9%     Review of patient's allergies indicates:  No Known Allergies  Objective:     Vital Signs (Most Recent):  Temp: 99.3 °F (37.4 °C) (12/26/19 0823)  Pulse: (!) 114 (12/26/19 0823)  Resp: 18 (12/26/19 0823)  BP: 110/87 (12/26/19 0823)  SpO2: (!) 90 % (12/26/19 0823) Vital Signs (24h Range):  Temp:  [96.3 °F (35.7 °C)-99.3 °F (37.4 °C)] 99.3 °F (37.4 °C)  Pulse:  [101-118] 114  Resp:  [12-20] 18  SpO2:  [90 %-95 %] 90 %  BP: (110-144)/(80-90) 110/87     Weight: 78.5 kg (172 lb 15.9 oz)  Body mass index is 25.55 kg/m².    Intake/Output - Last 3 Shifts       12/24 0700 - 12/25 0659 12/25 0700 - 12/26 0659 12/26 0700 - 12/27 0659    P.O. 1000 80     I.V. (mL/kg) 796.3 (10.1)      IV Piggyback 200      Total Intake(mL/kg) 1996.3 (25.4) 80 (1)     Urine (mL/kg/hr) 900 (0.5) 1380 (0.7)     Emesis/NG output  1200     Drains 80 5630     Stool 875 1675 350    Total Output 1855 9885 350    Net +141.3 -9805 -350           Stool Occurrence 0 x            Physical Exam     Constitutional: He appears well-developed and well-nourished. No distress.   HENT:   Head: Normocephalic and atraumatic.   Eyes: Pupils are equal,  round. EOM are normal.   Neck: No thyromegaly present.   Cardiovascular: Tachycardic. intact distal pulses.   Pulmonary/Chest: Effort normal and breath sounds normal. No respiratory distress. He has no wheezes.   Abdominal:   Soft but mildly distended, tympanic, appropriately tender.  Ileostomy site LLQ, green/yellow liquid in ostomy bag.   Existing ureterostomy site RLQ, with red rubber catheter and stent, urine present.   NGT with bilious tinged output.  Musculoskeletal: He exhibits no edema.   Neurological: No sensory deficit.   Skin: Skin is warm and dry. Capillary refill takes less than 2 seconds. He is not diaphoretic. No erythema.   Nursing note and vitals reviewed.    Significant Labs:  CBC:   Recent Labs   Lab 12/26/19  0311   WBC 18.86*   RBC 4.31*   HGB 13.0*   HCT 39.3*   *   MCV 91   MCH 30.2   MCHC 33.1     CMP:   Recent Labs   Lab 12/25/19  0400   GLU 92   CALCIUM 9.9   ALBUMIN 2.6*   PROT 6.6      K 3.7   CO2 28   CL 93*   BUN 19   CREATININE 1.3   ALKPHOS 83   ALT 9*   AST 16   BILITOT 0.5     Coagulation:   Recent Labs   Lab 12/22/19  1802   LABPROT 12.2   INR 1.2   APTT 28.6       Significant Diagnostics:  None

## 2019-12-26 NOTE — ASSESSMENT & PLAN NOTE
53 yo M with hx metastatic prostate cancer s/p b/l orchiectomy, bladder cancer s/p cystoprostatectomy with ileal conduit, had intraoperative left ureteral injury during SBR; underwent reimplant on 12/22    - Management of ileus per general surgery  - Continue BE drain for now  - continue red rubber catheter in stoma x 3 weeks  - continue single J left ureteral stent x 6 weeks  - Wound Care consult for ostomy care  - strict I&O  - Ambulate TID  - remainder of care per primary

## 2019-12-26 NOTE — PROGRESS NOTES
[facility]  Urology  Progress Note    Patient Name: Kiko Gruber  MRN: [id number]  Admission Date: 12/22/2019  Hospital Length of Stay: 4 days  Code Status: Full Code   Attending Provider: Ck Herron MD   Primary Care Physician: Lala Juarez MD    Subjective:     HPI:  53 yo M with hx of bladder cancer s/p cystoprostatectomy with ileal conduit, also metastatic prostate CA s/p bilateral orchiectomy, who was undergoing ex-lap with SBR for internal hernia, had intraoperative left ureteral injury; underwent reimplant of left ureter in conduit    Interval History:   ngt in place   Ileostomy output picking up  Drain output low  UOP adequate    Review of Systems    Objective:     Temp:  [96.3 °F (35.7 °C)-98.2 °F (36.8 °C)] 96.3 °F (35.7 °C)  Pulse:  [101-118] 115  Resp:  [12-20] 18  SpO2:  [92 %-95 %] 92 %  BP: (110-144)/(80-90) 116/87     Body mass index is 25.55 kg/m².           Drains     Drain                 Closed/Suction Drain 12/22/19 1630 Left LUQ Bulb 19 Fr. 1 day         Ileostomy 12/22/19 1704 Standard (Ewelina, end) LUQ 1 day         NG/OG Tube 12/22/19 1004 nasogastric 14 Fr. Right nostril 1 day         Urostomy 12/22/19 1654 ileal conduit RUQ 1 day                Physical Exam   Constitutional: He is oriented to person, place, and time. He appears well-developed and well-nourished.   HENT:   Head: Normocephalic and atraumatic.   NGT in place   Eyes: Conjunctivae are normal.   Neck: Normal range of motion.   Cardiovascular: Normal rate.    Pulmonary/Chest: Effort normal. No respiratory distress.   Abdominal: Soft. He exhibits no distension.   midline incision c/d/i; BE x 1 with SS output; ileostomy appears pink, healthy   Genitourinary:   Genitourinary Comments: Urostomy pink, healthy; red rubber and single-J stent in place, ileostomy appliance over urostomy filled with urine;    Musculoskeletal: Normal range of motion.   Neurological: He is alert and oriented to person,  place, and time.   Skin: Skin is warm and dry.     Psychiatric: He has a normal mood and affect. His behavior is normal. Judgment and thought content normal.       Significant Labs:    BMP:  Recent Labs   Lab 12/23/19  0259 12/24/19 0422 12/25/19  0400   * 139 136   K 4.1 3.8 3.7    102 93*   CO2 22* 27 28   BUN 16 21* 19   CREATININE 1.8* 1.4 1.3   CALCIUM 7.6* 8.8 9.9       CBC:   Recent Labs   Lab 12/24/19  0422 12/25/19  0400 12/26/19  0311   WBC 27.70* 13.61* 18.86*   HGB 10.5* 11.9* 13.0*   HCT 31.7* 35.9* 39.3*   * 126* 145*       All pertinent labs results from the past 24 hours have been reviewed.    Significant Imaging:  All pertinent imaging results/findings from the past 24 hours have been reviewed.                  Assessment/Plan:     Left ureteral injury  55 yo M with hx metastatic prostate cancer s/p b/l orchiectomy, bladder cancer s/p cystoprostatectomy with ileal conduit, had intraoperative left ureteral injury during SBR; underwent reimplant on 12/22    - Management of ileus per general surgery  - Continue BE drain for now  - continue red rubber catheter in stoma x 3 weeks  - continue single J left ureteral stent x 6 weeks  - Wound Care consult for ostomy care  - strict I&O  - Ambulate TID  - remainder of care per primary        VTE Risk Mitigation (From admission, onward)         Ordered     heparin (porcine) injection 5,000 Units  Every 8 hours      12/24/19 0923     Place sequential compression device  Until discontinued      12/22/19 1800     IP VTE HIGH RISK PATIENT  Once      12/22/19 1800                Domingo Colindres MD  Urology  [facility]

## 2019-12-26 NOTE — PLAN OF CARE
POC reviewed with patient and sister. NG to right nare with large output today, tolerating well. Tele, tachycardia noted. Patient refused breathing today. Midline incision intact with staples. Urostomy patent and draining, output recorded, Ileostomy also draining liquid brown stool. Left BE drain with only 10 ml of serous output. Patient NPO, no vomiting since NG tube inserted this am. HR increased to 125ml/hr to 20 g to left forearm (started today). SCDs placed on patient and tolerating well. Bed in low and locked position, call light in reach. Will continue to monitor.

## 2019-12-26 NOTE — PT/OT/SLP PROGRESS
Physical Therapy      Patient Name:  Kiok Gruber   MRN:  [id number]    Patient not seen today secondary to waiting for nurse to get in the shower. Will follow-up again at next scheduled visit.    Teresa Frances, PT

## 2019-12-26 NOTE — SUBJECTIVE & OBJECTIVE
Interval History:   ngt in place   Ileostomy output picking up  Drain output low  UOP adequate    Review of Systems    Objective:     Temp:  [96.3 °F (35.7 °C)-98.2 °F (36.8 °C)] 96.3 °F (35.7 °C)  Pulse:  [101-118] 115  Resp:  [12-20] 18  SpO2:  [92 %-95 %] 92 %  BP: (110-144)/(80-90) 116/87     Body mass index is 25.55 kg/m².           Drains     Drain                 Closed/Suction Drain 12/22/19 1630 Left LUQ Bulb 19 Fr. 1 day         Ileostomy 12/22/19 1704 Standard (Ewelina, end) LUQ 1 day         NG/OG Tube 12/22/19 1004 nasogastric 14 Fr. Right nostril 1 day         Urostomy 12/22/19 1654 ileal conduit RUQ 1 day                Physical Exam   Constitutional: He is oriented to person, place, and time. He appears well-developed and well-nourished.   HENT:   Head: Normocephalic and atraumatic.   NGT in place   Eyes: Conjunctivae are normal.   Neck: Normal range of motion.   Cardiovascular: Normal rate.    Pulmonary/Chest: Effort normal. No respiratory distress.   Abdominal: Soft. He exhibits no distension.   midline incision c/d/i; BE x 1 with SS output; ileostomy appears pink, healthy   Genitourinary:   Genitourinary Comments: Urostomy pink, healthy; red rubber and single-J stent in place, ileostomy appliance over urostomy filled with urine;    Musculoskeletal: Normal range of motion.   Neurological: He is alert and oriented to person, place, and time.   Skin: Skin is warm and dry.     Psychiatric: He has a normal mood and affect. His behavior is normal. Judgment and thought content normal.       Significant Labs:    BMP:  Recent Labs   Lab 12/23/19  0259 12/24/19  0422 12/25/19  0400   * 139 136   K 4.1 3.8 3.7    102 93*   CO2 22* 27 28   BUN 16 21* 19   CREATININE 1.8* 1.4 1.3   CALCIUM 7.6* 8.8 9.9       CBC:   Recent Labs   Lab 12/24/19  0422 12/25/19  0400 12/26/19  0311   WBC 27.70* 13.61* 18.86*   HGB 10.5* 11.9* 13.0*   HCT 31.7* 35.9* 39.3*   * 126* 145*       All pertinent labs  results from the past 24 hours have been reviewed.    Significant Imaging:  All pertinent imaging results/findings from the past 24 hours have been reviewed.

## 2019-12-26 NOTE — PROGRESS NOTES
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 Also on prednisone as part of chemo. Getting Neupogen.  Pt states he ate last night around 10p and just after that noted severe abdominal pain and nausea- had multiple episodes of emesis NBNB. Tried his anti-nausea medication but did not help so he went to ED for evaluation. Last BM was yesterday. No fevers at home. Pain has not improved. At OSH had CTabd/pelvis which revealed high grade sbo concerning for internal hernia with closed loop as well as moderate left hydronephrosis and significant perinephric edema with focal narrowing of L ureter as it enters possible internal hernia. WBC at OSH 35.5 and sodium 125 lactate 2.3.    Post-Op Info:  Procedure(s) (LRB):  LAPAROTOMY, EXPLORATORY with  Small bowel resection and Ileostomy creation. (N/A)  URETERONEOCYSTOSTOMY reimplant to conduit (Left)  EXCISION, SMALL INTESTINE   4 Days Post-Op     Interval History:     No major events overnight.  Afebrile, vitals stable but he is mildly tachycardic.  NGT with 5.6L in past 24 hours.  Vomited yesterday.   WBC elevated to 18 from 13  Ostomy with thin output     Medications:  Continuous Infusions:   dextrose 5 % and 0.45 % NaCl with KCl 20 mEq 125 mL/hr at 12/25/19 1745     Scheduled Meds:   acetaminophen  650 mg Oral Q8H    albuterol-ipratropium  3 mL Nebulization Q4H    heparin (porcine)  5,000 Units Subcutaneous Q8H    lisinopril  10 mg Oral Daily    nicotine  1 patch Transdermal Daily    pantoprazole  40 mg Intravenous BID    piperacillin-tazobactam (ZOSYN) IVPB  4.5 g Intravenous Q8H    predniSONE  5 mg Oral BID     PRN Meds:calcium carbonate, ondansetron,  oxyCODONE, oxyCODONE, promethazine (PHENERGAN) IVPB, sodium chloride 0.9%, sodium chloride 0.9%     Review of patient's allergies indicates:  No Known Allergies  Objective:     Vital Signs (Most Recent):  Temp: 99.3 °F (37.4 °C) (12/26/19 0823)  Pulse: (!) 114 (12/26/19 0823)  Resp: 18 (12/26/19 0823)  BP: 110/87 (12/26/19 0823)  SpO2: (!) 90 % (12/26/19 0823) Vital Signs (24h Range):  Temp:  [96.3 °F (35.7 °C)-99.3 °F (37.4 °C)] 99.3 °F (37.4 °C)  Pulse:  [101-118] 114  Resp:  [12-20] 18  SpO2:  [90 %-95 %] 90 %  BP: (110-144)/(80-90) 110/87     Weight: 78.5 kg (172 lb 15.9 oz)  Body mass index is 25.55 kg/m².    Intake/Output - Last 3 Shifts       12/24 0700 - 12/25 0659 12/25 0700 - 12/26 0659 12/26 0700 - 12/27 0659    P.O. 1000 80     I.V. (mL/kg) 796.3 (10.1)      IV Piggyback 200      Total Intake(mL/kg) 1996.3 (25.4) 80 (1)     Urine (mL/kg/hr) 900 (0.5) 1380 (0.7)     Emesis/NG output  1200     Drains 80 5630     Stool 875 1675 350    Total Output 1855 9885 350    Net +141.3 -9805 -350           Stool Occurrence 0 x            Physical Exam     Constitutional: He appears well-developed and well-nourished. No distress.   HENT:   Head: Normocephalic and atraumatic.   Eyes: Pupils are equal, round. EOM are normal.   Neck: No thyromegaly present.   Cardiovascular: Tachycardic. intact distal pulses.   Pulmonary/Chest: Effort normal and breath sounds normal. No respiratory distress. He has no wheezes.   Abdominal:   Soft but mildly distended, tympanic, appropriately tender.  Ileostomy site LLQ, green/yellow liquid in ostomy bag.   Existing ureterostomy site RLQ, with red rubber catheter and stent, urine present.   NGT with bilious tinged output.  Musculoskeletal: He exhibits no edema.   Neurological: No sensory deficit.   Skin: Skin is warm and dry. Capillary refill takes less than 2 seconds. He is not diaphoretic. No erythema.   Nursing note and vitals reviewed.    Significant Labs:  CBC:   Recent Labs   Lab  12/26/19  0311   WBC 18.86*   RBC 4.31*   HGB 13.0*   HCT 39.3*   *   MCV 91   MCH 30.2   MCHC 33.1     CMP:   Recent Labs   Lab 12/25/19  0400   GLU 92   CALCIUM 9.9   ALBUMIN 2.6*   PROT 6.6      K 3.7   CO2 28   CL 93*   BUN 19   CREATININE 1.3   ALKPHOS 83   ALT 9*   AST 16   BILITOT 0.5     Coagulation:   Recent Labs   Lab 12/22/19  1802   LABPROT 12.2   INR 1.2   APTT 28.6       Significant Diagnostics:  None    Assessment/Plan:     Abdominal pain  53yo M with metastatic prostate cancer s/p radical cystectomy with urostomy on chemo with closed loop obstruction. Now s/p ex-lap 12/22 with resection of segment of necrotic bowel. Iatrogenic L ureter injury now s/p reimplantation    - NPO, OK for sips of water/ice chips twice an hour  - NG tube to LIWS, keep today  - mIVF  - replete lytes  - BE management per urology  - Strict I/O  - Cont broad spectrum ABX  - PRN pain/nausea control PO  - F/u home med list (wife is calling pharmacy) will restart as appropriate  - GI ppx, DVT ppx  - PT/OT    Dispo: Continue NGT and replace fluid loss as it had high output in past 24 hours.        Rafael White MD  General Surgery  [facility]

## 2019-12-26 NOTE — PLAN OF CARE
Plan of care reviewed with patient & sister, all questions and concerns addressed. Patient AAOx4.  VSS, showing tachycardia in the 110's, no complaints of SOB, headaches, or dizziness. NGT, right nare, large output start of shift tapered to a much smaller output at end of shift.  Urostomy and ileostomy patent and draining well with large liquid output. Left BE Drain with only scant amount of output. Patient is tolerating being NPO, sips of water with meds, and ice chips, no complaints of nausea or vomiting. Patient denies pain. SCDs placed on patient and tolerating well.  Patient is resting quietly with side rails up and call light with in reach. Will continue to monitor patient status.

## 2019-12-27 LAB
ALBUMIN SERPL BCP-MCNC: 2.6 G/DL (ref 3.5–5.2)
ALP SERPL-CCNC: 78 U/L (ref 55–135)
ALT SERPL W/O P-5'-P-CCNC: 9 U/L (ref 10–44)
ANION GAP SERPL CALC-SCNC: 13 MMOL/L (ref 8–16)
ANION GAP SERPL CALC-SCNC: 17 MMOL/L (ref 8–16)
ANISOCYTOSIS BLD QL SMEAR: SLIGHT
ANISOCYTOSIS BLD QL SMEAR: SLIGHT
AST SERPL-CCNC: 11 U/L (ref 10–40)
BACTERIA BLD CULT: NORMAL
BACTERIA BLD CULT: NORMAL
BASOPHILS # BLD AUTO: 0.05 K/UL (ref 0–0.2)
BASOPHILS # BLD AUTO: 0.05 K/UL (ref 0–0.2)
BASOPHILS NFR BLD: 0.2 % (ref 0–1.9)
BASOPHILS NFR BLD: 0.2 % (ref 0–1.9)
BILIRUB SERPL-MCNC: 0.4 MG/DL (ref 0.1–1)
BUN SERPL-MCNC: 37 MG/DL (ref 6–20)
BUN SERPL-MCNC: 37 MG/DL (ref 6–20)
CALCIUM SERPL-MCNC: 8.8 MG/DL (ref 8.7–10.5)
CALCIUM SERPL-MCNC: 9.4 MG/DL (ref 8.7–10.5)
CHLORIDE SERPL-SCNC: 86 MMOL/L (ref 95–110)
CHLORIDE SERPL-SCNC: 88 MMOL/L (ref 95–110)
CO2 SERPL-SCNC: 27 MMOL/L (ref 23–29)
CO2 SERPL-SCNC: 32 MMOL/L (ref 23–29)
CREAT SERPL-MCNC: 1.5 MG/DL (ref 0.5–1.4)
CREAT SERPL-MCNC: 1.6 MG/DL (ref 0.5–1.4)
DIFFERENTIAL METHOD: ABNORMAL
DIFFERENTIAL METHOD: ABNORMAL
EOSINOPHIL # BLD AUTO: 0 K/UL (ref 0–0.5)
EOSINOPHIL # BLD AUTO: 0 K/UL (ref 0–0.5)
EOSINOPHIL NFR BLD: 0 % (ref 0–8)
EOSINOPHIL NFR BLD: 0 % (ref 0–8)
ERYTHROCYTE [DISTWIDTH] IN BLOOD BY AUTOMATED COUNT: 14 % (ref 11.5–14.5)
ERYTHROCYTE [DISTWIDTH] IN BLOOD BY AUTOMATED COUNT: 14 % (ref 11.5–14.5)
EST. GFR  (AFRICAN AMERICAN): 55.2 ML/MIN/1.73 M^2
EST. GFR  (AFRICAN AMERICAN): 59.7 ML/MIN/1.73 M^2
EST. GFR  (NON AFRICAN AMERICAN): 47.8 ML/MIN/1.73 M^2
EST. GFR  (NON AFRICAN AMERICAN): 51.7 ML/MIN/1.73 M^2
GIANT PLATELETS BLD QL SMEAR: PRESENT
GIANT PLATELETS BLD QL SMEAR: PRESENT
GLUCOSE SERPL-MCNC: 105 MG/DL (ref 70–110)
GLUCOSE SERPL-MCNC: 112 MG/DL (ref 70–110)
HCT VFR BLD AUTO: 38.3 % (ref 40–54)
HCT VFR BLD AUTO: 38.3 % (ref 40–54)
HGB BLD-MCNC: 12.3 G/DL (ref 14–18)
HGB BLD-MCNC: 12.3 G/DL (ref 14–18)
IMM GRANULOCYTES # BLD AUTO: 0.4 K/UL (ref 0–0.04)
IMM GRANULOCYTES # BLD AUTO: 0.4 K/UL (ref 0–0.04)
IMM GRANULOCYTES NFR BLD AUTO: 1.7 % (ref 0–0.5)
IMM GRANULOCYTES NFR BLD AUTO: 1.7 % (ref 0–0.5)
LYMPHOCYTES # BLD AUTO: 1 K/UL (ref 1–4.8)
LYMPHOCYTES # BLD AUTO: 1 K/UL (ref 1–4.8)
LYMPHOCYTES NFR BLD: 4.2 % (ref 18–48)
LYMPHOCYTES NFR BLD: 4.2 % (ref 18–48)
MAGNESIUM SERPL-MCNC: 1.9 MG/DL (ref 1.6–2.6)
MCH RBC QN AUTO: 29.6 PG (ref 27–31)
MCH RBC QN AUTO: 29.6 PG (ref 27–31)
MCHC RBC AUTO-ENTMCNC: 32.1 G/DL (ref 32–36)
MCHC RBC AUTO-ENTMCNC: 32.1 G/DL (ref 32–36)
MCV RBC AUTO: 92 FL (ref 82–98)
MCV RBC AUTO: 92 FL (ref 82–98)
MONOCYTES # BLD AUTO: 1.2 K/UL (ref 0.3–1)
MONOCYTES # BLD AUTO: 1.2 K/UL (ref 0.3–1)
MONOCYTES NFR BLD: 5.2 % (ref 4–15)
MONOCYTES NFR BLD: 5.2 % (ref 4–15)
NEUTROPHILS # BLD AUTO: 20.5 K/UL (ref 1.8–7.7)
NEUTROPHILS # BLD AUTO: 20.5 K/UL (ref 1.8–7.7)
NEUTROPHILS NFR BLD: 88.7 % (ref 38–73)
NEUTROPHILS NFR BLD: 88.7 % (ref 38–73)
NRBC BLD-RTO: 0 /100 WBC
NRBC BLD-RTO: 0 /100 WBC
PHOSPHATE SERPL-MCNC: 5 MG/DL (ref 2.7–4.5)
PLATELET # BLD AUTO: 179 K/UL (ref 150–350)
PLATELET # BLD AUTO: 179 K/UL (ref 150–350)
PLATELET BLD QL SMEAR: ABNORMAL
PLATELET BLD QL SMEAR: ABNORMAL
PMV BLD AUTO: 10.3 FL (ref 9.2–12.9)
PMV BLD AUTO: 10.3 FL (ref 9.2–12.9)
POTASSIUM SERPL-SCNC: 3.7 MMOL/L (ref 3.5–5.1)
POTASSIUM SERPL-SCNC: 4 MMOL/L (ref 3.5–5.1)
PROT SERPL-MCNC: 7 G/DL (ref 6–8.4)
RBC # BLD AUTO: 4.16 M/UL (ref 4.6–6.2)
RBC # BLD AUTO: 4.16 M/UL (ref 4.6–6.2)
SODIUM SERPL-SCNC: 131 MMOL/L (ref 136–145)
SODIUM SERPL-SCNC: 132 MMOL/L (ref 136–145)
TOXIC GRANULES BLD QL SMEAR: PRESENT
TOXIC GRANULES BLD QL SMEAR: PRESENT
WBC # BLD AUTO: 23.07 K/UL (ref 3.9–12.7)
WBC # BLD AUTO: 23.07 K/UL (ref 3.9–12.7)

## 2019-12-27 PROCEDURE — 20600001 HC STEP DOWN PRIVATE ROOM

## 2019-12-27 PROCEDURE — 25000003 PHARM REV CODE 250: Performed by: SURGERY

## 2019-12-27 PROCEDURE — 80053 COMPREHEN METABOLIC PANEL: CPT

## 2019-12-27 PROCEDURE — 25000003 PHARM REV CODE 250: Performed by: STUDENT IN AN ORGANIZED HEALTH CARE EDUCATION/TRAINING PROGRAM

## 2019-12-27 PROCEDURE — 63600175 PHARM REV CODE 636 W HCPCS: Performed by: GENERAL PRACTICE

## 2019-12-27 PROCEDURE — S4991 NICOTINE PATCH NONLEGEND: HCPCS | Performed by: STUDENT IN AN ORGANIZED HEALTH CARE EDUCATION/TRAINING PROGRAM

## 2019-12-27 PROCEDURE — 43752 NASAL/OROGASTRIC W/TUBE PLMT: CPT

## 2019-12-27 PROCEDURE — 83735 ASSAY OF MAGNESIUM: CPT

## 2019-12-27 PROCEDURE — 27000646 HC AEROBIKA DEVICE

## 2019-12-27 PROCEDURE — 94664 DEMO&/EVAL PT USE INHALER: CPT

## 2019-12-27 PROCEDURE — 36415 COLL VENOUS BLD VENIPUNCTURE: CPT

## 2019-12-27 PROCEDURE — C9113 INJ PANTOPRAZOLE SODIUM, VIA: HCPCS | Performed by: STUDENT IN AN ORGANIZED HEALTH CARE EDUCATION/TRAINING PROGRAM

## 2019-12-27 PROCEDURE — 99900035 HC TECH TIME PER 15 MIN (STAT)

## 2019-12-27 PROCEDURE — 80048 BASIC METABOLIC PNL TOTAL CA: CPT

## 2019-12-27 PROCEDURE — 63600175 PHARM REV CODE 636 W HCPCS: Performed by: STUDENT IN AN ORGANIZED HEALTH CARE EDUCATION/TRAINING PROGRAM

## 2019-12-27 PROCEDURE — 94799 UNLISTED PULMONARY SVC/PX: CPT

## 2019-12-27 PROCEDURE — 94640 AIRWAY INHALATION TREATMENT: CPT

## 2019-12-27 PROCEDURE — 25000242 PHARM REV CODE 250 ALT 637 W/ HCPCS: Performed by: SURGERY

## 2019-12-27 PROCEDURE — 84100 ASSAY OF PHOSPHORUS: CPT

## 2019-12-27 PROCEDURE — 25000242 PHARM REV CODE 250 ALT 637 W/ HCPCS: Performed by: STUDENT IN AN ORGANIZED HEALTH CARE EDUCATION/TRAINING PROGRAM

## 2019-12-27 PROCEDURE — 63600175 PHARM REV CODE 636 W HCPCS: Performed by: SURGERY

## 2019-12-27 PROCEDURE — 97161 PT EVAL LOW COMPLEX 20 MIN: CPT

## 2019-12-27 PROCEDURE — 85025 COMPLETE CBC W/AUTO DIFF WBC: CPT

## 2019-12-27 PROCEDURE — 94761 N-INVAS EAR/PLS OXIMETRY MLT: CPT

## 2019-12-27 PROCEDURE — 97165 OT EVAL LOW COMPLEX 30 MIN: CPT

## 2019-12-27 RX ORDER — DIPHENHYDRAMINE HYDROCHLORIDE 50 MG/ML
25 INJECTION INTRAMUSCULAR; INTRAVENOUS NIGHTLY PRN
Status: DISCONTINUED | OUTPATIENT
Start: 2019-12-27 | End: 2020-01-01 | Stop reason: HOSPADM

## 2019-12-27 RX ORDER — IPRATROPIUM BROMIDE AND ALBUTEROL SULFATE 2.5; .5 MG/3ML; MG/3ML
3 SOLUTION RESPIRATORY (INHALATION)
Status: DISCONTINUED | OUTPATIENT
Start: 2019-12-27 | End: 2020-01-01 | Stop reason: HOSPADM

## 2019-12-27 RX ADMIN — HEPARIN SODIUM 5000 UNITS: 5000 INJECTION, SOLUTION INTRAVENOUS; SUBCUTANEOUS at 09:12

## 2019-12-27 RX ADMIN — IPRATROPIUM BROMIDE AND ALBUTEROL SULFATE 3 ML: .5; 3 SOLUTION RESPIRATORY (INHALATION) at 08:12

## 2019-12-27 RX ADMIN — SODIUM CHLORIDE 1000 ML: 0.9 INJECTION, SOLUTION INTRAVENOUS at 01:12

## 2019-12-27 RX ADMIN — ACETAMINOPHEN 650 MG: 325 TABLET ORAL at 06:12

## 2019-12-27 RX ADMIN — PIPERACILLIN AND TAZOBACTAM 4.5 G: 4; .5 INJECTION, POWDER, FOR SOLUTION INTRAVENOUS at 11:12

## 2019-12-27 RX ADMIN — SODIUM CHLORIDE 1000 ML: 0.9 INJECTION, SOLUTION INTRAVENOUS at 10:12

## 2019-12-27 RX ADMIN — LISINOPRIL 10 MG: 10 TABLET ORAL at 10:12

## 2019-12-27 RX ADMIN — HEPARIN SODIUM 5000 UNITS: 5000 INJECTION, SOLUTION INTRAVENOUS; SUBCUTANEOUS at 06:12

## 2019-12-27 RX ADMIN — DIPHENHYDRAMINE HYDROCHLORIDE 25 MG: 50 INJECTION INTRAMUSCULAR; INTRAVENOUS at 09:12

## 2019-12-27 RX ADMIN — PREDNISONE 5 MG: 5 TABLET ORAL at 10:12

## 2019-12-27 RX ADMIN — PANTOPRAZOLE SODIUM 40 MG: 40 INJECTION, POWDER, FOR SOLUTION INTRAVENOUS at 09:12

## 2019-12-27 RX ADMIN — SODIUM CHLORIDE 1000 ML: 0.9 INJECTION, SOLUTION INTRAVENOUS at 05:12

## 2019-12-27 RX ADMIN — HEPARIN SODIUM 5000 UNITS: 5000 INJECTION, SOLUTION INTRAVENOUS; SUBCUTANEOUS at 01:12

## 2019-12-27 RX ADMIN — ACETAMINOPHEN 650 MG: 325 TABLET ORAL at 01:12

## 2019-12-27 RX ADMIN — IPRATROPIUM BROMIDE AND ALBUTEROL SULFATE 3 ML: .5; 3 SOLUTION RESPIRATORY (INHALATION) at 12:12

## 2019-12-27 RX ADMIN — Medication 1 PATCH: at 10:12

## 2019-12-27 RX ADMIN — PANTOPRAZOLE SODIUM 40 MG: 40 INJECTION, POWDER, FOR SOLUTION INTRAVENOUS at 10:12

## 2019-12-27 RX ADMIN — IPRATROPIUM BROMIDE AND ALBUTEROL SULFATE 3 ML: .5; 3 SOLUTION RESPIRATORY (INHALATION) at 04:12

## 2019-12-27 RX ADMIN — PREDNISONE 5 MG: 5 TABLET ORAL at 09:12

## 2019-12-27 RX ADMIN — PIPERACILLIN AND TAZOBACTAM 4.5 G: 4; .5 INJECTION, POWDER, FOR SOLUTION INTRAVENOUS at 06:12

## 2019-12-27 RX ADMIN — PIPERACILLIN AND TAZOBACTAM 4.5 G: 4; .5 INJECTION, POWDER, FOR SOLUTION INTRAVENOUS at 03:12

## 2019-12-27 RX ADMIN — ACETAMINOPHEN 650 MG: 325 TABLET ORAL at 09:12

## 2019-12-27 RX ADMIN — POTASSIUM CHLORIDE, DEXTROSE MONOHYDRATE AND SODIUM CHLORIDE: 150; 5; 450 INJECTION, SOLUTION INTRAVENOUS at 10:12

## 2019-12-27 NOTE — PLAN OF CARE
POC reviewed with patient and sister. Isabella aaox4. Patient sat up in chair most of the day, tolerated well. Bed switched out to low air loss bed. Continues with high output from Ileostomy, NS 1 liter boluses given x3 to replace fluids. Tele monitoring in progress, NSR. New diet of clear liquids today,. Pt tolerated well, deinies nausea. NG tube and BE drain removed by MD today. Denies pain. Primary fluids increased to 150ml/hr. Call light in reach, Will continue to monitor.

## 2019-12-27 NOTE — PROGRESS NOTES
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 Also on prednisone as part of chemo. Getting Neupogen.  Pt states he ate last night around 10p and just after that noted severe abdominal pain and nausea- had multiple episodes of emesis NBNB. Tried his anti-nausea medication but did not help so he went to ED for evaluation. Last BM was yesterday. No fevers at home. Pain has not improved. At OSH had CTabd/pelvis which revealed high grade sbo concerning for internal hernia with closed loop as well as moderate left hydronephrosis and significant perinephric edema with focal narrowing of L ureter as it enters possible internal hernia. WBC at OSH 35.5 and sodium 125 lactate 2.3.    Post-Op Info:  Procedure(s) (LRB):  LAPAROTOMY, EXPLORATORY with  Small bowel resection and Ileostomy creation. (N/A)  URETERONEOCYSTOSTOMY reimplant to conduit (Left)  EXCISION, SMALL INTESTINE   5 Days Post-Op     Interval History:   Patient seen and examined, no acute events overnight  NGT put out 1350  Denies N/V  Ostomy with 3L output  Afebrile/VSS    Medications:  Continuous Infusions:   dextrose 5 % and 0.45 % NaCl with KCl 20 mEq 125 mL/hr at 12/25/19 1745     Scheduled Meds:   acetaminophen  650 mg Oral Q8H    albuterol-ipratropium  3 mL Nebulization Q4H    heparin (porcine)  5,000 Units Subcutaneous Q8H    lisinopril  10 mg Oral Daily    nicotine  1 patch Transdermal Daily    pantoprazole  40 mg Intravenous BID    piperacillin-tazobactam (ZOSYN) IVPB  4.5 g Intravenous Q8H    predniSONE  5 mg Oral BID     PRN Meds:calcium carbonate, ondansetron, oxyCODONE, oxyCODONE, promethazine (PHENERGAN) IVPB, sodium chloride 0.9%,  sodium chloride 0.9%     Review of patient's allergies indicates:  No Known Allergies  Objective:     Vital Signs (Most Recent):  Temp: 98.2 °F (36.8 °C) (12/27/19 0757)  Pulse: 100 (12/27/19 0757)  Resp: 16 (12/27/19 0757)  BP: 134/87 (12/27/19 0757)  SpO2: (!) 93 % (12/27/19 0757) Vital Signs (24h Range):  Temp:  [96.8 °F (36 °C)-99.3 °F (37.4 °C)] 98.2 °F (36.8 °C)  Pulse:  [] 100  Resp:  [14-18] 16  SpO2:  [88 %-94 %] 93 %  BP: ()/(69-87) 134/87     Weight: 78.5 kg (172 lb 15.9 oz)  Body mass index is 25.55 kg/m².    Intake/Output - Last 3 Shifts       12/25 0700 - 12/26 0659 12/26 0700 - 12/27 0659 12/27 0700 - 12/28 0659    P.O. 80      I.V. (mL/kg)  1437.5 (18.3)     IV Piggyback  200     Total Intake(mL/kg) 80 (1) 1637.5 (20.9)     Urine (mL/kg/hr) 1380 (0.7) 950 (0.5)     Emesis/NG output 1200      Drains 5630 1390     Stool 1675 3200     Total Output 9885 5540     Net -9805 -3902.5                  Physical Exam   Constitutional: He appears well-developed and well-nourished. No distress.   HENT:   Head: Normocephalic and atraumatic.   Cardiovascular: Normal rate and regular rhythm.   Pulmonary/Chest: Effort normal. No respiratory distress.   Abdominal:   Soft, mildly distended, appropriate TTP  Ileostomy site LLQ, green/yellow liquid in ostomy bag.   Existing ureterostomy site RLQ, with red rubber catheter and stent, urine present.   NGT output mostly clear     Significant Labs:  CBC:   Recent Labs   Lab 12/27/19  0457   WBC 23.07*  23.07*   RBC 4.16*  4.16*   HGB 12.3*  12.3*   HCT 38.3*  38.3*     179   MCV 92  92   MCH 29.6  29.6   MCHC 32.1  32.1     CMP:   Recent Labs   Lab 12/27/19  0457      CALCIUM 9.4   ALBUMIN 2.6*   PROT 7.0   *   K 4.0   CO2 32*   CL 86*   BUN 37*   CREATININE 1.6*   ALKPHOS 78   ALT 9*   AST 11   BILITOT 0.4     Coagulation:   Recent Labs   Lab 12/22/19  1802   LABPROT 12.2   INR 1.2   APTT 28.6     Assessment/Plan:     Left ureteral  injury  Appreciate urology assistance     Abdominal pain  55yo M with metastatic prostate cancer s/p radical cystectomy with urostomy on chemo with closed loop obstruction. Now s/p ex-lap 12/22 with resection of segment of necrotic bowel. Iatrogenic L ureter injury now s/p reimplantation    - NPO, OK for sips of water/ice chips twice an hour  - clamp NGT   - mIVF d5 125  - IV abx - zosyn started 12/22  - BE management per urology  - Strict I/O  - PRN pain/nausea control PO  - GI ppx, DVT ppx  - PT/OT - has not worked with PT since admit        Kathe Frey PA-C   [id number]  General Surgery  [facility]

## 2019-12-27 NOTE — SUBJECTIVE & OBJECTIVE
Interval History:   Patient seen and examined, no acute events overnight  NGT put out 1350  Denies N/V  Ostomy with 3L output  Afebrile/VSS    Medications:  Continuous Infusions:   dextrose 5 % and 0.45 % NaCl with KCl 20 mEq 125 mL/hr at 12/25/19 1745     Scheduled Meds:   acetaminophen  650 mg Oral Q8H    albuterol-ipratropium  3 mL Nebulization Q4H    heparin (porcine)  5,000 Units Subcutaneous Q8H    lisinopril  10 mg Oral Daily    nicotine  1 patch Transdermal Daily    pantoprazole  40 mg Intravenous BID    piperacillin-tazobactam (ZOSYN) IVPB  4.5 g Intravenous Q8H    predniSONE  5 mg Oral BID     PRN Meds:calcium carbonate, ondansetron, oxyCODONE, oxyCODONE, promethazine (PHENERGAN) IVPB, sodium chloride 0.9%, sodium chloride 0.9%     Review of patient's allergies indicates:  No Known Allergies  Objective:     Vital Signs (Most Recent):  Temp: 98.2 °F (36.8 °C) (12/27/19 0757)  Pulse: 100 (12/27/19 0757)  Resp: 16 (12/27/19 0757)  BP: 134/87 (12/27/19 0757)  SpO2: (!) 93 % (12/27/19 0757) Vital Signs (24h Range):  Temp:  [96.8 °F (36 °C)-99.3 °F (37.4 °C)] 98.2 °F (36.8 °C)  Pulse:  [] 100  Resp:  [14-18] 16  SpO2:  [88 %-94 %] 93 %  BP: ()/(69-87) 134/87     Weight: 78.5 kg (172 lb 15.9 oz)  Body mass index is 25.55 kg/m².    Intake/Output - Last 3 Shifts       12/25 0700 - 12/26 0659 12/26 0700 - 12/27 0659 12/27 0700 - 12/28 0659    P.O. 80      I.V. (mL/kg)  1437.5 (18.3)     IV Piggyback  200     Total Intake(mL/kg) 80 (1) 1637.5 (20.9)     Urine (mL/kg/hr) 1380 (0.7) 950 (0.5)     Emesis/NG output 1200      Drains 5630 1390     Stool 1675 3200     Total Output 9885 5540     Net -9805 -3902.5                  Physical Exam   Constitutional: He appears well-developed and well-nourished. No distress.   HENT:   Head: Normocephalic and atraumatic.   Cardiovascular: Normal rate and regular rhythm.   Pulmonary/Chest: Effort normal. No respiratory distress.   Abdominal:   Soft, mildly  distended, appropriate TTP  Ileostomy site LLQ, green/yellow liquid in ostomy bag.   Existing ureterostomy site RLQ, with red rubber catheter and stent, urine present.   NGT output mostly clear     Significant Labs:  CBC:   Recent Labs   Lab 12/27/19 0457   WBC 23.07*  23.07*   RBC 4.16*  4.16*   HGB 12.3*  12.3*   HCT 38.3*  38.3*     179   MCV 92  92   MCH 29.6  29.6   MCHC 32.1  32.1     CMP:   Recent Labs   Lab 12/27/19  0457      CALCIUM 9.4   ALBUMIN 2.6*   PROT 7.0   *   K 4.0   CO2 32*   CL 86*   BUN 37*   CREATININE 1.6*   ALKPHOS 78   ALT 9*   AST 11   BILITOT 0.4     Coagulation:   Recent Labs   Lab 12/22/19  1802   LABPROT 12.2   INR 1.2   APTT 28.6

## 2019-12-27 NOTE — PLAN OF CARE
Per ESTELA Lamb, no  agency has accepted patient. Patient is caring for his own urostomy x 4 yrs. CM will arrange OP wound care follow-up.

## 2019-12-27 NOTE — SUBJECTIVE & OBJECTIVE
Interval History:   ngt in place, clear liquid in canister (pateint drinking water overnight)  Ileostomy output increased  Drain output low  UOP adequate    Review of Systems    Objective:     Temp:  [96.8 °F (36 °C)-99.3 °F (37.4 °C)] 96.9 °F (36.1 °C)  Pulse:  [] 96  Resp:  [14-18] 18  SpO2:  [88 %-94 %] 94 %  BP: ()/(69-87) 132/85     Body mass index is 25.55 kg/m².           Drains     Drain                 Closed/Suction Drain 12/22/19 1630 Left LUQ Bulb 19 Fr. 1 day         Ileostomy 12/22/19 1704 Standard (Ewelina, end) LUQ 1 day         NG/OG Tube 12/22/19 1004 nasogastric 14 Fr. Right nostril 1 day         Urostomy 12/22/19 1654 ileal conduit RUQ 1 day                Physical Exam   Constitutional: He is oriented to person, place, and time. He appears well-developed and well-nourished.   HENT:   Head: Normocephalic and atraumatic.   NGT in place   Eyes: Conjunctivae are normal.   Neck: Normal range of motion.   Cardiovascular: Normal rate.    Pulmonary/Chest: Effort normal. No respiratory distress.   Abdominal: Soft. He exhibits no distension.   midline incision c/d/i; BE x 1 with SS output; ileostomy appears pink, healthy   Genitourinary:   Genitourinary Comments: Urostomy pink, healthy; red rubber and single-J stent in place, ileostomy appliance over urostomy filled with urine;    Musculoskeletal: Normal range of motion.   Neurological: He is alert and oriented to person, place, and time.   Skin: Skin is warm and dry.     Psychiatric: He has a normal mood and affect. His behavior is normal. Judgment and thought content normal.       Significant Labs:    BMP:  Recent Labs   Lab 12/25/19  0400 12/26/19  0924 12/27/19  0457    138 131*   K 3.7 4.5 4.0   CL 93* 92* 86*   CO2 28 32* 32*   BUN 19 28* 37*   CREATININE 1.3 1.4 1.6*   CALCIUM 9.9 9.6 9.4       CBC:   Recent Labs   Lab 12/25/19  0400 12/26/19  0311 12/27/19  0457   WBC 13.61* 18.86* 23.07*  23.07*   HGB 11.9* 13.0* 12.3*  12.3*    HCT 35.9* 39.3* 38.3*  38.3*   * 145* 179  179       All pertinent labs results from the past 24 hours have been reviewed.    Significant Imaging:  All pertinent imaging results/findings from the past 24 hours have been reviewed.

## 2019-12-27 NOTE — ASSESSMENT & PLAN NOTE
55yo M with metastatic prostate cancer s/p radical cystectomy with urostomy on chemo with closed loop obstruction. Now s/p ex-lap 12/22 with resection of segment of necrotic bowel. Iatrogenic L ureter injury now s/p reimplantation    - NPO, OK for sips of water/ice chips twice an hour  - clamp NGT   - mIVF d5 125  - IV abx - zosyn started 12/22  - BE management per urology  - Strict I/O  - PRN pain/nausea control PO  - GI ppx, DVT ppx  - PT/OT - has not worked with PT since admit

## 2019-12-27 NOTE — PT/OT/SLP EVAL
Physical Therapy Evaluation/Discharge    Patient Name:  Kiko Gruber   MRN:  [id number]    Recommendations:     Discharge Recommendations:  (home no needs)   Discharge Equipment Recommendations: none   Barriers to discharge: None    Assessment:     Kiko Gruber is a 54 y.o. male admitted with a medical diagnosis of SBO (small bowel obstruction).  He presents with the following impairments/functional limitations:  (no rehab issues) pt vernon treatment well with no rehab needs. Pt is safe to ambulate on unit and will be able to discharge home with no needs when medically stable. Pt was transferred from [facility].   Pt is s/p exp lap , resection small bowel ileostomy creation 12/22/19.    Rehab Prognosis: Good; patient would benefit from acute skilled PT services to address these deficits and reach maximum level of function.    Recent Surgery: Procedure(s) (LRB):  LAPAROTOMY, EXPLORATORY with  Small bowel resection and Ileostomy creation. (N/A)  URETERONEOCYSTOSTOMY reimplant to conduit (Left)  EXCISION, SMALL INTESTINE 5 Days Post-Op    Plan:     During this hospitalization, patient to be seen (pt is being discharged from PT. ) to address the identified rehab impairments via (pt is safe to ambulate on unit. ) and progress toward the following goals:    · Plan of Care Expires:  12/27/19    Subjective     Chief Complaint: pt had no complaints during treatment.   Patient/Family Comments/goals: to get better and go home.   Pain/Comfort:  · Pain Rating 1: 0/10  · Pain Rating Post-Intervention 1: 0/10    Patients cultural, spiritual, Taoist conflicts given the current situation: no    Living Environment:  Pt is medically disabled and lives alone. Pt will be living with his sister in her 1 story with 3 steps and R handrail. Sister lives there M-F and pt will be alone on weekend.   Prior to admission, patients level of function was Independent .  Equipment used at home: crutches.  DME owned (not currently used): none.   Upon discharge, patient will have assistance from sister during the week.     Objective:     Communicated with nurse prior to session.  Patient found supine with NG tube, peripheral IV, carlos catheter, BE drain, telemetry, colostomy  upon PT entry to room.    General Precautions: Standard, fall   Orthopedic Precautions:    Braces:       Exams:  · Cognitive Exam:  Patient is oriented to Person, Place, Time and Situation  · RLE ROM: WFL  · RLE Strength: WFL  · LLE ROM: WFL  · LLE Strength: WFL    Functional Mobility:  · Bed Mobility:     · Rolling Right: independence  · Supine to Sit: independence  ·   · Transfers:     · Sit to Stand:  independence with no AD  ·   · Gait: pt received gait training ~ 250 ft Independently.   ·   · Stairs:  Pt ascended/descended 3  stair(s) with No Assistive Device with right handrail with Modified Independent.         AM-PAC 6 CLICK MOBILITY  Total Score:24     Patient left up in chair with all lines intact, call button in reach and sister present.    GOALS:   Multidisciplinary Problems     Physical Therapy Goals     Not on file                History:     History reviewed. No pertinent past medical history.    Past Surgical History:   Procedure Laterality Date    REIMPLANT URETER IN BLADDER Left 12/22/2019    Procedure: URETERONEOCYSTOSTOMY reimplant to conduit;  Surgeon: Manish Styles MD;  Location: [facility] OR [address] [place] [address];  Service: Urology;  Laterality: Left;       Time Tracking:     PT Received On: 12/27/19  PT Start Time: 0813     PT Stop Time: 0826  PT Total Time (min): 13 min     Billable Minutes: Evaluation 13 min      Domenica Garcia, PT  12/27/2019

## 2019-12-27 NOTE — PLAN OF CARE
12/27/19 1024   Post-Acute Status   Post-Acute Authorization Home Health/Hospice   Home Health/Hospice Status Insurance Denial   Sw attempted to obtain HH for pt upon DC. Pt has been declined by: [facility], [facility] and [facility] due to insurance.     Nicolette Rudd LMSW  [facility]  [phone]

## 2019-12-27 NOTE — PLAN OF CARE
AAO x 4. ML with staples. R nare NG tube. LUQ ileostomy putting out large amounts of brown liquid. RUQ urostomy, adequate urine output. RLQ BE drain. Pt ambulates with assistance. NPO except sips of water and ice, no complaints of N/V overnight. VSS on RA. On tele running NSR. No complaints of pain overnight. Pt remained free of falls overnight. POC reviewed with pt who verbalized understanding. Bed in low position, call light in reach. No signs of distress or concerns noted at this time. WCTM.

## 2019-12-27 NOTE — PLAN OF CARE
OT eval completed; rec home no OT needs. Pt with active progressive mobility order set for nursing staff for endurance mgmt while in acute setting- he is indep with mobility and ADLs-- will require staff encouragement to complete. LORNA Cotton 12/27/2019

## 2019-12-27 NOTE — PLAN OF CARE
Visited pt discussed discharge plan as below. Patient informed no Home Health agency will accept his insurance.      12/27/19 1010   Discharge Reassessment   Assessment Type Discharge Planning Reassessment   Provided patient/caregiver education on the expected discharge date and the discharge plan Yes   Do you have any problems affording any of your prescribed medications? No   Discharge Plan A Home;Other  (outpt wound care )   Discharge Plan B Home;Other  (outpt wound care )   DME Needed Upon Discharge  colostomy/ostomy supplies  (per hospital wound care nurse. )   Anticipated Discharge Disposition Home  (outpt wound care )   Can the patient answer the patient profile reliably? Yes, cognitively intact

## 2019-12-27 NOTE — ASSESSMENT & PLAN NOTE
55 yo M with hx metastatic prostate cancer s/p b/l orchiectomy, bladder cancer s/p cystoprostatectomy with ileal conduit, had intraoperative left ureteral injury during SBR; underwent reimplant on 12/22    - Management of ileus per general surgery, ostomy output increased  - Continue BE drain for now, possibly remove today, pending general surgery plans  - continue red rubber catheter in stoma x 3 weeks  - continue single J left ureteral stent x 6 weeks  - Wound Care consult for ostomy care  - strict I&O  - Ambulate TID  - remainder of care per primary

## 2019-12-27 NOTE — PROGRESS NOTES
[facility]  Urology  Progress Note    Patient Name: Kiko Gruber  MRN: [id number]  Admission Date: 12/22/2019  Hospital Length of Stay: 5 days  Code Status: Full Code   Attending Provider: Ck Herron MD   Primary Care Physician: Lala Juarez MD    Subjective:     HPI:  53 yo M with hx of bladder cancer s/p cystoprostatectomy with ileal conduit, also metastatic prostate CA s/p bilateral orchiectomy, who was undergoing ex-lap with SBR for internal hernia, had intraoperative left ureteral injury; underwent reimplant of left ureter in conduit    Interval History:   ngt in place, clear liquid in canister (pateint drinking water overnight)  Ileostomy output increased  Drain output low  UOP adequate    Review of Systems    Objective:     Temp:  [96.8 °F (36 °C)-99.3 °F (37.4 °C)] 96.9 °F (36.1 °C)  Pulse:  [] 96  Resp:  [14-18] 18  SpO2:  [88 %-94 %] 94 %  BP: ()/(69-87) 132/85     Body mass index is 25.55 kg/m².           Drains     Drain                 Closed/Suction Drain 12/22/19 1630 Left LUQ Bulb 19 Fr. 1 day         Ileostomy 12/22/19 1704 Standard (Ewelina, end) LUQ 1 day         NG/OG Tube 12/22/19 1004 nasogastric 14 Fr. Right nostril 1 day         Urostomy 12/22/19 1654 ileal conduit RUQ 1 day                Physical Exam   Constitutional: He is oriented to person, place, and time. He appears well-developed and well-nourished.   HENT:   Head: Normocephalic and atraumatic.   NGT in place   Eyes: Conjunctivae are normal.   Neck: Normal range of motion.   Cardiovascular: Normal rate.    Pulmonary/Chest: Effort normal. No respiratory distress.   Abdominal: Soft. He exhibits no distension.   midline incision c/d/i; BE x 1 with SS output; ileostomy appears pink, healthy   Genitourinary:   Genitourinary Comments: Urostomy pink, healthy; red rubber and single-J stent in place, ileostomy appliance over urostomy filled with urine;    Musculoskeletal: Normal range of motion.    Neurological: He is alert and oriented to person, place, and time.   Skin: Skin is warm and dry.     Psychiatric: He has a normal mood and affect. His behavior is normal. Judgment and thought content normal.       Significant Labs:    BMP:  Recent Labs   Lab 12/25/19  0400 12/26/19  0924 12/27/19  0457    138 131*   K 3.7 4.5 4.0   CL 93* 92* 86*   CO2 28 32* 32*   BUN 19 28* 37*   CREATININE 1.3 1.4 1.6*   CALCIUM 9.9 9.6 9.4       CBC:   Recent Labs   Lab 12/25/19  0400 12/26/19  0311 12/27/19  0457   WBC 13.61* 18.86* 23.07*  23.07*   HGB 11.9* 13.0* 12.3*  12.3*   HCT 35.9* 39.3* 38.3*  38.3*   * 145* 179  179       All pertinent labs results from the past 24 hours have been reviewed.    Significant Imaging:  All pertinent imaging results/findings from the past 24 hours have been reviewed.                  Assessment/Plan:     Left ureteral injury  55 yo M with hx metastatic prostate cancer s/p b/l orchiectomy, bladder cancer s/p cystoprostatectomy with ileal conduit, had intraoperative left ureteral injury during SBR; underwent reimplant on 12/22    - Management of ileus per general surgery, ostomy output increased  - Continue BE drain for now, possibly remove today, pending general surgery plans  - continue red rubber catheter in stoma x 3 weeks  - continue single J left ureteral stent x 6 weeks  - Wound Care consult for ostomy care  - strict I&O  - Ambulate TID  - remainder of care per primary        VTE Risk Mitigation (From admission, onward)         Ordered     heparin (porcine) injection 5,000 Units  Every 8 hours      12/24/19 0923     Place sequential compression device  Until discontinued      12/22/19 1800     IP VTE HIGH RISK PATIENT  Once      12/22/19 1800                Domingo Colindres MD  Urology  [facility]

## 2019-12-27 NOTE — PROGRESS NOTES
Ostomy follow up for continued teaching.     Patient still has NGT in place. Patient states he is feeling better today.     Urostomy pouch changed. Red rubber and stent still in place. Peristomal skin intact.     Reviewed entire process of changing the ileostomy pouch. Patient states he feels confident that he will be able to empty and change pouch at home. Patient continues to have very high output. Called to Dr White and informed of output. Reviewed the I and O flow sheet with pt and sister to chart after discharge. Measuring cups provided as well.   Stoma red and well budded, slightly edematous. Peristomal skin intact. New pouch applied. Stoma appro 32 mm.   Discussed peristomal care and what to do if irritated. Patient familiar with powder and spray and has some at home.     CM to set up appointment with Nancy GALARZA NP for post op ostomy. Starter kits have been ordered.     Wound care to follow as needed.  Blanquita Dubois RN CWSOCORRO [facility]   [phone]

## 2019-12-27 NOTE — PT/OT/SLP EVAL
Occupational Therapy   Evaluation and Discharge Note    Name: Kiko Gruber  MRN: [id number]  Admitting Diagnosis:  SBO (small bowel obstruction) 5 Days Post-Op    Recommendations:     Discharge Recommendations: home(no OT needs)  Discharge Equipment Recommendations:  none  Barriers to discharge:  None    Assessment:     Kiko Gruber is a 54 y.o. male with a medical diagnosis of SBO (small bowel obstruction). At this time, patient is functioning at their prior level of function for ADLs and household mobility and does not require further acute or post acute OT services. Pt with active progressive mobility order set for nursing staff for his endurance mgmt while in acute setting. He is safe to be up for ADLs/mobility-- will require encouragement of staff to complete OOB/hallway mobility.     Plan:     During this hospitalization, patient does not require further acute OT services.  Please re-consult if situation changes.    · Plan of Care Reviewed with: patient, sibling    Subjective     Chief Complaint: none reported   Patient/Family Comments/goals: sleep    Occupational Profile:  Living Environment:  Pt will be living with his sister in her 1 story with 3 steps and R handrail. Sister lives there M-F and pt will be alone on weekend.   Previous level of function: medically disabled. indep with ostomy care. indep with ADLs/self care/mobility.   Roles and Routines: care taker to self, home and community dweller  Equipment Used at home:  colostomy/ostomy supplies, crutches(owns- does not use)  Assistance upon Discharge: yes    Pain/Comfort:  · Pain Rating 1: 0/10  · Pain Rating Post-Intervention 1: 0/10    Patients cultural, spiritual, Amish conflicts given the current situation: no    Objective:     Communicated with: RN prior to session. Completed with PT. Patient found supine with telemetry, BE drain, peripheral IV, NG tube, colostomy upon OT entry to room.    General Precautions: Standard, fall, NPO   Orthopedic  Precautions:N/A   Braces: N/A     Occupational Performance:    Bed Mobility:    · Patient completed Supine to Sit with modified independence and HOB elevated    Functional Mobility/Transfers:  · Patient completed Sit <> Stand Transfer with independence  with  no assistive device   · Patient completed Bed <> Chair Transfer using Stand Pivot technique with independence with no assistive device  · Functional Mobility: indep for household distance    Activities of Daily Living:  · Upper Body Dressing: modified independence with setup  · Lower Body Dressing: modified independence with set up; donning pants, shoes    Cognitive/Visual Perceptual:  Cognitive/Psychosocial Skills:     -       Oriented to: Person, Place, Time and Situation   -       Safety awareness/insight to disability: intact   -       Mood/Affect/Coping skills/emotional control: Cooperative    Physical Exam:  Postural examination/scapula alignment:    -       Rounded shoulders  Motor Planning:    -       intact B UE  Dominant hand:    -       R  Upper Extremity Range of Motion:     -       Right Upper Extremity: WFL  -       Left Upper Extremity: WFL  Upper Extremity Strength:    -       Right Upper Extremity: WFL  -       Left Upper Extremity: WFL   Strength:    -       Right Upper Extremity: WFL  -       Left Upper Extremity: WFL  Fine Motor Coordination:    -       Intact  Left hand, finger to nose, Right hand, finger to nose, Left hand, manipulation of objects and Right hand, manipulation of objects    AMPAC 6 Click ADL:  AMPAC Total Score: 24   Body mass index is 25.55 kg/m².  Vitals:    12/27/19 0827   BP:    Pulse: 99   Resp: (!) 22   Temp:        Treatment & Education:  -Pt alert and agreeable to therapy eval; edu on therapy role in care  -edu on importance of activity and mobility for healing process  -Communication board updated; questions/concerns addressed within OT scope of practice  Education:    Patient left up in chair with all lines  intact, call button in reach and RT and sister present    History:     History reviewed. No pertinent past medical history.    Past Surgical History:   Procedure Laterality Date    REIMPLANT URETER IN BLADDER Left 12/22/2019    Procedure: URETERONEOCYSTOSTOMY reimplant to conduit;  Surgeon: Manish Styles MD;  Location: [facility] OR [address] [place] [address];  Service: Urology;  Laterality: Left;       Time Tracking:     OT Date of Treatment: 12/27/19  OT Start Time: 0813  OT Stop Time: 0827  OT Total Time (min): 14 min    Billable Minutes:Evaluation 12    LORNA Cotton  12/27/2019

## 2019-12-28 LAB
ANION GAP SERPL CALC-SCNC: 11 MMOL/L (ref 8–16)
BASOPHILS # BLD AUTO: 0.05 K/UL (ref 0–0.2)
BASOPHILS NFR BLD: 0.3 % (ref 0–1.9)
BUN SERPL-MCNC: 24 MG/DL (ref 6–20)
CALCIUM SERPL-MCNC: 8.9 MG/DL (ref 8.7–10.5)
CHLORIDE SERPL-SCNC: 95 MMOL/L (ref 95–110)
CO2 SERPL-SCNC: 27 MMOL/L (ref 23–29)
CREAT SERPL-MCNC: 1.2 MG/DL (ref 0.5–1.4)
DIFFERENTIAL METHOD: ABNORMAL
EOSINOPHIL # BLD AUTO: 0 K/UL (ref 0–0.5)
EOSINOPHIL NFR BLD: 0.2 % (ref 0–8)
ERYTHROCYTE [DISTWIDTH] IN BLOOD BY AUTOMATED COUNT: 14 % (ref 11.5–14.5)
EST. GFR  (AFRICAN AMERICAN): >60 ML/MIN/1.73 M^2
EST. GFR  (NON AFRICAN AMERICAN): >60 ML/MIN/1.73 M^2
GLUCOSE SERPL-MCNC: 125 MG/DL (ref 70–110)
HCT VFR BLD AUTO: 34.1 % (ref 40–54)
HGB BLD-MCNC: 11.5 G/DL (ref 14–18)
IMM GRANULOCYTES # BLD AUTO: 0.36 K/UL (ref 0–0.04)
IMM GRANULOCYTES NFR BLD AUTO: 2 % (ref 0–0.5)
LYMPHOCYTES # BLD AUTO: 0.8 K/UL (ref 1–4.8)
LYMPHOCYTES NFR BLD: 4.7 % (ref 18–48)
MAGNESIUM SERPL-MCNC: 1.6 MG/DL (ref 1.6–2.6)
MCH RBC QN AUTO: 30.6 PG (ref 27–31)
MCHC RBC AUTO-ENTMCNC: 33.7 G/DL (ref 32–36)
MCV RBC AUTO: 91 FL (ref 82–98)
MONOCYTES # BLD AUTO: 1.1 K/UL (ref 0.3–1)
MONOCYTES NFR BLD: 6.4 % (ref 4–15)
NEUTROPHILS # BLD AUTO: 15.4 K/UL (ref 1.8–7.7)
NEUTROPHILS NFR BLD: 86.4 % (ref 38–73)
NRBC BLD-RTO: 0 /100 WBC
PHOSPHATE SERPL-MCNC: 3.5 MG/DL (ref 2.7–4.5)
PLATELET # BLD AUTO: 226 K/UL (ref 150–350)
PMV BLD AUTO: 10 FL (ref 9.2–12.9)
POTASSIUM SERPL-SCNC: 3.7 MMOL/L (ref 3.5–5.1)
RBC # BLD AUTO: 3.76 M/UL (ref 4.6–6.2)
SODIUM SERPL-SCNC: 133 MMOL/L (ref 136–145)
WBC # BLD AUTO: 17.78 K/UL (ref 3.9–12.7)

## 2019-12-28 PROCEDURE — 63600175 PHARM REV CODE 636 W HCPCS: Performed by: STUDENT IN AN ORGANIZED HEALTH CARE EDUCATION/TRAINING PROGRAM

## 2019-12-28 PROCEDURE — S4991 NICOTINE PATCH NONLEGEND: HCPCS | Performed by: STUDENT IN AN ORGANIZED HEALTH CARE EDUCATION/TRAINING PROGRAM

## 2019-12-28 PROCEDURE — 94640 AIRWAY INHALATION TREATMENT: CPT

## 2019-12-28 PROCEDURE — 36415 COLL VENOUS BLD VENIPUNCTURE: CPT

## 2019-12-28 PROCEDURE — 25000003 PHARM REV CODE 250: Performed by: STUDENT IN AN ORGANIZED HEALTH CARE EDUCATION/TRAINING PROGRAM

## 2019-12-28 PROCEDURE — 63600175 PHARM REV CODE 636 W HCPCS: Performed by: SURGERY

## 2019-12-28 PROCEDURE — C9113 INJ PANTOPRAZOLE SODIUM, VIA: HCPCS | Performed by: STUDENT IN AN ORGANIZED HEALTH CARE EDUCATION/TRAINING PROGRAM

## 2019-12-28 PROCEDURE — 20600001 HC STEP DOWN PRIVATE ROOM

## 2019-12-28 PROCEDURE — 25000003 PHARM REV CODE 250: Performed by: SURGERY

## 2019-12-28 PROCEDURE — 94664 DEMO&/EVAL PT USE INHALER: CPT

## 2019-12-28 PROCEDURE — 80048 BASIC METABOLIC PNL TOTAL CA: CPT

## 2019-12-28 PROCEDURE — 84100 ASSAY OF PHOSPHORUS: CPT

## 2019-12-28 PROCEDURE — 83735 ASSAY OF MAGNESIUM: CPT

## 2019-12-28 PROCEDURE — 85025 COMPLETE CBC W/AUTO DIFF WBC: CPT

## 2019-12-28 PROCEDURE — 94761 N-INVAS EAR/PLS OXIMETRY MLT: CPT

## 2019-12-28 PROCEDURE — 99900035 HC TECH TIME PER 15 MIN (STAT)

## 2019-12-28 PROCEDURE — 25000242 PHARM REV CODE 250 ALT 637 W/ HCPCS: Performed by: SURGERY

## 2019-12-28 RX ORDER — POTASSIUM CHLORIDE 7.45 MG/ML
10 INJECTION INTRAVENOUS
Status: COMPLETED | OUTPATIENT
Start: 2019-12-28 | End: 2019-12-28

## 2019-12-28 RX ORDER — MAGNESIUM SULFATE HEPTAHYDRATE 40 MG/ML
2 INJECTION, SOLUTION INTRAVENOUS ONCE
Status: COMPLETED | OUTPATIENT
Start: 2019-12-28 | End: 2019-12-28

## 2019-12-28 RX ORDER — SCOLOPAMINE TRANSDERMAL SYSTEM 1 MG/1
1 PATCH, EXTENDED RELEASE TRANSDERMAL
Status: DISCONTINUED | OUTPATIENT
Start: 2019-12-28 | End: 2020-01-01 | Stop reason: HOSPADM

## 2019-12-28 RX ADMIN — POTASSIUM CHLORIDE, DEXTROSE MONOHYDRATE AND SODIUM CHLORIDE: 150; 5; 450 INJECTION, SOLUTION INTRAVENOUS at 10:12

## 2019-12-28 RX ADMIN — HEPARIN SODIUM 5000 UNITS: 5000 INJECTION, SOLUTION INTRAVENOUS; SUBCUTANEOUS at 01:12

## 2019-12-28 RX ADMIN — IPRATROPIUM BROMIDE AND ALBUTEROL SULFATE 3 ML: .5; 3 SOLUTION RESPIRATORY (INHALATION) at 08:12

## 2019-12-28 RX ADMIN — PROMETHAZINE HYDROCHLORIDE 6.25 MG: 25 INJECTION INTRAMUSCULAR; INTRAVENOUS at 01:12

## 2019-12-28 RX ADMIN — POTASSIUM CHLORIDE 10 MEQ: 7.46 INJECTION, SOLUTION INTRAVENOUS at 04:12

## 2019-12-28 RX ADMIN — POTASSIUM CHLORIDE 10 MEQ: 7.46 INJECTION, SOLUTION INTRAVENOUS at 01:12

## 2019-12-28 RX ADMIN — PROMETHAZINE HYDROCHLORIDE 6.25 MG: 25 INJECTION INTRAMUSCULAR; INTRAVENOUS at 04:12

## 2019-12-28 RX ADMIN — POTASSIUM CHLORIDE 10 MEQ: 7.46 INJECTION, SOLUTION INTRAVENOUS at 03:12

## 2019-12-28 RX ADMIN — PANTOPRAZOLE SODIUM 40 MG: 40 INJECTION, POWDER, FOR SOLUTION INTRAVENOUS at 10:12

## 2019-12-28 RX ADMIN — PREDNISONE 5 MG: 5 TABLET ORAL at 08:12

## 2019-12-28 RX ADMIN — PIPERACILLIN AND TAZOBACTAM 4.5 G: 4; .5 INJECTION, POWDER, FOR SOLUTION INTRAVENOUS at 06:12

## 2019-12-28 RX ADMIN — ACETAMINOPHEN 650 MG: 325 TABLET ORAL at 06:12

## 2019-12-28 RX ADMIN — PROMETHAZINE HYDROCHLORIDE 6.25 MG: 25 INJECTION INTRAMUSCULAR; INTRAVENOUS at 09:12

## 2019-12-28 RX ADMIN — MAGNESIUM SULFATE IN WATER 2 G: 40 INJECTION, SOLUTION INTRAVENOUS at 03:12

## 2019-12-28 RX ADMIN — SODIUM CHLORIDE 1000 ML: 0.9 INJECTION, SOLUTION INTRAVENOUS at 07:12

## 2019-12-28 RX ADMIN — ONDANSETRON HYDROCHLORIDE 8 MG: 4 TABLET, FILM COATED ORAL at 02:12

## 2019-12-28 RX ADMIN — HEPARIN SODIUM 5000 UNITS: 5000 INJECTION, SOLUTION INTRAVENOUS; SUBCUTANEOUS at 10:12

## 2019-12-28 RX ADMIN — HEPARIN SODIUM 5000 UNITS: 5000 INJECTION, SOLUTION INTRAVENOUS; SUBCUTANEOUS at 06:12

## 2019-12-28 RX ADMIN — POTASSIUM CHLORIDE, DEXTROSE MONOHYDRATE AND SODIUM CHLORIDE: 150; 5; 450 INJECTION, SOLUTION INTRAVENOUS at 05:12

## 2019-12-28 RX ADMIN — ONDANSETRON HYDROCHLORIDE 8 MG: 4 TABLET, FILM COATED ORAL at 11:12

## 2019-12-28 RX ADMIN — ACETAMINOPHEN 650 MG: 325 TABLET ORAL at 10:12

## 2019-12-28 RX ADMIN — SODIUM CHLORIDE 1000 ML: 0.9 INJECTION, SOLUTION INTRAVENOUS at 01:12

## 2019-12-28 RX ADMIN — PREDNISONE 5 MG: 5 TABLET ORAL at 10:12

## 2019-12-28 RX ADMIN — PANTOPRAZOLE SODIUM 40 MG: 40 INJECTION, POWDER, FOR SOLUTION INTRAVENOUS at 08:12

## 2019-12-28 RX ADMIN — POTASSIUM CHLORIDE, DEXTROSE MONOHYDRATE AND SODIUM CHLORIDE: 150; 5; 450 INJECTION, SOLUTION INTRAVENOUS at 03:12

## 2019-12-28 RX ADMIN — Medication 1 PATCH: at 08:12

## 2019-12-28 RX ADMIN — SODIUM CHLORIDE 1000 ML: 0.9 INJECTION, SOLUTION INTRAVENOUS at 09:12

## 2019-12-28 RX ADMIN — SODIUM CHLORIDE 1000 ML: 0.9 INJECTION, SOLUTION INTRAVENOUS at 02:12

## 2019-12-28 RX ADMIN — LISINOPRIL 10 MG: 10 TABLET ORAL at 08:12

## 2019-12-28 RX ADMIN — SCOPALAMINE 1 PATCH: 1 PATCH, EXTENDED RELEASE TRANSDERMAL at 04:12

## 2019-12-28 NOTE — PROGRESS NOTES
Ostomy nurse follow up.    Patients pouches intact. Patient not feeling well today due to nausea. Patient and sister deny any questions or needs at this time.     Wound care to follow PRN.  Blanquita Dubois RN [facility]   [phone]

## 2019-12-28 NOTE — PROGRESS NOTES
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 Also on prednisone as part of chemo. Getting Neupogen.  Pt states he ate last night around 10p and just after that noted severe abdominal pain and nausea- had multiple episodes of emesis NBNB. Tried his anti-nausea medication but did not help so he went to ED for evaluation. Last BM was yesterday. No fevers at home. Pain has not improved. At OSH had CTabd/pelvis which revealed high grade sbo concerning for internal hernia with closed loop as well as moderate left hydronephrosis and significant perinephric edema with focal narrowing of L ureter as it enters possible internal hernia. WBC at OSH 35.5 and sodium 125 lactate 2.3.    Post-Op Info:  Procedure(s) (LRB):  LAPAROTOMY, EXPLORATORY with  Small bowel resection and Ileostomy creation. (N/A)  URETERONEOCYSTOSTOMY reimplant to conduit (Left)  EXCISION, SMALL INTESTINE   6 Days Post-Op     Interval History:     No major events overnight.VSS  Nausea, vomited multiple times only twice was substantial overnight.   Ileostomy with 2L thin output. uop adequate.   Drain removed by urology  No issues with pain    Medications:  Continuous Infusions:   dextrose 5 % and 0.45 % NaCl with KCl 20 mEq 150 mL/hr at 12/28/19 0541     Scheduled Meds:   acetaminophen  650 mg Oral Q8H    albuterol-ipratropium  3 mL Nebulization Q4H WAKE    heparin (porcine)  5,000 Units Subcutaneous Q8H    lisinopril  10 mg Oral Daily    nicotine  1 patch Transdermal Daily    pantoprazole  40 mg Intravenous BID    piperacillin-tazobactam (ZOSYN) IVPB  4.5 g Intravenous Q8H    predniSONE  5 mg Oral BID     PRN Meds:calcium carbonate,  diphenhydrAMINE, ondansetron, oxyCODONE, oxyCODONE, promethazine (PHENERGAN) IVPB, sodium chloride 0.9%, sodium chloride 0.9%, sodium chloride 0.9%     Review of patient's allergies indicates:  No Known Allergies  Objective:     Vital Signs (Most Recent):  Temp: 97.6 °F (36.4 °C) (12/28/19 0324)  Pulse: 86 (12/28/19 0722)  Resp: 16 (12/28/19 0324)  BP: (!) 133/91 (12/28/19 0324)  SpO2: 95 % (12/28/19 0324) Vital Signs (24h Range):  Temp:  [97.2 °F (36.2 °C)-98.6 °F (37 °C)] 97.6 °F (36.4 °C)  Pulse:  [] 86  Resp:  [16-22] 16  SpO2:  [90 %-95 %] 95 %  BP: (109-134)/(70-91) 133/91     Weight: 78.5 kg (172 lb 15.9 oz)  Body mass index is 25.55 kg/m².    Intake/Output - Last 3 Shifts       12/26 0700 - 12/27 0659 12/27 0700 - 12/28 0659 12/28 0700 - 12/29 0659    P.O.       I.V. (mL/kg) 1437.5 (18.3)      IV Piggyback 200      Total Intake(mL/kg) 1637.5 (20.9)      Urine (mL/kg/hr) 950 (0.5) 1050 (0.6)     Emesis/NG output  0     Drains 1390 20     Stool 3200 2050     Total Output 5540 3120     Net -3902.5 -3120            Emesis Occurrence  1 x           Physical Exam   Constitutional: He appears well-developed and well-nourished. No distress.   HENT:   Head: Normocephalic and atraumatic.   Cardiovascular: Normal rate and regular rhythm.   Pulmonary/Chest: Effort normal. No respiratory distress.   Abdominal:   Soft, mildly distended, nontender  Ileostomy site LLQ, green liquid in ostomy bag.   Midline incision with staples, c/d/i  Existing ureterostomy site RLQ, with red rubber catheter and stent, urine present.        Significant Labs:  CBC:   Recent Labs   Lab 12/27/19  0457   WBC 23.07*  23.07*   RBC 4.16*  4.16*   HGB 12.3*  12.3*   HCT 38.3*  38.3*     179   MCV 92  92   MCH 29.6  29.6   MCHC 32.1  32.1     CMP:   Recent Labs   Lab 12/27/19  0457 12/27/19  1306    112*   CALCIUM 9.4 8.8   ALBUMIN 2.6*  --    PROT 7.0  --    * 132*   K 4.0 3.7   CO2 32* 27   CL 86* 88*   BUN 37*  37*   CREATININE 1.6* 1.5*   ALKPHOS 78  --    ALT 9*  --    AST 11  --    BILITOT 0.4  --      Coagulation:   Recent Labs   Lab 12/22/19  1802   LABPROT 12.2   INR 1.2   APTT 28.6     Assessment/Plan:     Left ureteral injury  Appreciate urology assistance     Abdominal pain  55yo M with metastatic prostate cancer s/p radical cystectomy with urostomy on chemo with closed loop obstruction. Now s/p ex-lap 12/22 with resection of segment of necrotic bowel. Iatrogenic L ureter injury now s/p reimplantation    - NPO, OK for sips of water/ice chips twice an hour due to emesis and small bowel dilatation   - mIVF d5 125  - IV abx - zosyn started 12/22  - BE removed 12/27  - KUB showing small bowel dilatation consistent with post op ileus  - Strict I/O, follow ileostomy output - may need boluses for large output   - PRN pain/nausea control PO  - GI ppx, DVT ppx  - PT/OT, ambulate 3x per day  - dispo: awaiting resolution of ileus        Jean Sood MD  General Surgery  [facility]

## 2019-12-28 NOTE — SUBJECTIVE & OBJECTIVE
Interval History:     No major events overnight.VSS  Nausea, vomited multiple times only twice was substantial overnight.   Ileostomy with 2L thin output. uop adequate.   Drain removed by urology  No issues with pain    Medications:  Continuous Infusions:   dextrose 5 % and 0.45 % NaCl with KCl 20 mEq 150 mL/hr at 12/28/19 0541     Scheduled Meds:   acetaminophen  650 mg Oral Q8H    albuterol-ipratropium  3 mL Nebulization Q4H WAKE    heparin (porcine)  5,000 Units Subcutaneous Q8H    lisinopril  10 mg Oral Daily    nicotine  1 patch Transdermal Daily    pantoprazole  40 mg Intravenous BID    piperacillin-tazobactam (ZOSYN) IVPB  4.5 g Intravenous Q8H    predniSONE  5 mg Oral BID     PRN Meds:calcium carbonate, diphenhydrAMINE, ondansetron, oxyCODONE, oxyCODONE, promethazine (PHENERGAN) IVPB, sodium chloride 0.9%, sodium chloride 0.9%, sodium chloride 0.9%     Review of patient's allergies indicates:  No Known Allergies  Objective:     Vital Signs (Most Recent):  Temp: 97.6 °F (36.4 °C) (12/28/19 0324)  Pulse: 86 (12/28/19 0722)  Resp: 16 (12/28/19 0324)  BP: (!) 133/91 (12/28/19 0324)  SpO2: 95 % (12/28/19 0324) Vital Signs (24h Range):  Temp:  [97.2 °F (36.2 °C)-98.6 °F (37 °C)] 97.6 °F (36.4 °C)  Pulse:  [] 86  Resp:  [16-22] 16  SpO2:  [90 %-95 %] 95 %  BP: (109-134)/(70-91) 133/91     Weight: 78.5 kg (172 lb 15.9 oz)  Body mass index is 25.55 kg/m².    Intake/Output - Last 3 Shifts       12/26 0700 - 12/27 0659 12/27 0700 - 12/28 0659 12/28 0700 - 12/29 0659    P.O.       I.V. (mL/kg) 1437.5 (18.3)      IV Piggyback 200      Total Intake(mL/kg) 1637.5 (20.9)      Urine (mL/kg/hr) 950 (0.5) 1050 (0.6)     Emesis/NG output  0     Drains 1390 20     Stool 3200 2050     Total Output 5540 3120     Net -3902.5 -3120            Emesis Occurrence  1 x           Physical Exam   Constitutional: He appears well-developed and well-nourished. No distress.   HENT:   Head: Normocephalic and atraumatic.    Cardiovascular: Normal rate and regular rhythm.   Pulmonary/Chest: Effort normal. No respiratory distress.   Abdominal:   Soft, mildly distended, nontender  Ileostomy site LLQ, green liquid in ostomy bag.   Midline incision with staples, c/d/i  Existing ureterostomy site RLQ, with red rubber catheter and stent, urine present.        Significant Labs:  CBC:   Recent Labs   Lab 12/27/19 0457   WBC 23.07*  23.07*   RBC 4.16*  4.16*   HGB 12.3*  12.3*   HCT 38.3*  38.3*     179   MCV 92  92   MCH 29.6  29.6   MCHC 32.1  32.1     CMP:   Recent Labs   Lab 12/27/19  0457 12/27/19  1306    112*   CALCIUM 9.4 8.8   ALBUMIN 2.6*  --    PROT 7.0  --    * 132*   K 4.0 3.7   CO2 32* 27   CL 86* 88*   BUN 37* 37*   CREATININE 1.6* 1.5*   ALKPHOS 78  --    ALT 9*  --    AST 11  --    BILITOT 0.4  --      Coagulation:   Recent Labs   Lab 12/22/19  1802   LABPROT 12.2   INR 1.2   APTT 28.6

## 2019-12-28 NOTE — PLAN OF CARE
AAO x 4. ML with staples. LUQ ileostomy putting out large amounts of brown liquid. So far, 650 mL of fluid was put out, so that has been replaced with 650 mL of NS through IV per PRN orders. RUQ urostomy, adequate urine output. Pt ambulates with assistance. Clear liquid diet, had one episode of emesis overnight. Pt was given PRN Zofran. VSS on RA. On tele running NSR. No complaints of pain overnight. Pt remained free of falls overnight. POC reviewed with pt who verbalized understanding. Bed in low position, call light in reach. No signs of distress or concerns noted at this time. WCTM.

## 2019-12-28 NOTE — ASSESSMENT & PLAN NOTE
53yo M with metastatic prostate cancer s/p radical cystectomy with urostomy on chemo with closed loop obstruction. Now s/p ex-lap 12/22 with resection of segment of necrotic bowel. Iatrogenic L ureter injury now s/p reimplantation    - NPO, OK for sips of water/ice chips twice an hour due to emesis and small bowel dilatation   - mIVF d5 125  - IV abx - zosyn started 12/22  - BE removed 12/27  - KUB showing small bowel dilatation consistent with post op ileus  - Strict I/O, follow ileostomy output - may need boluses for large output   - PRN pain/nausea control PO  - GI ppx, DVT ppx  - PT/OT, ambulate 3x per day  - dispo: awaiting resolution of ileus

## 2019-12-29 LAB
ANION GAP SERPL CALC-SCNC: 7 MMOL/L (ref 8–16)
BASOPHILS # BLD AUTO: 0.05 K/UL (ref 0–0.2)
BASOPHILS NFR BLD: 0.3 % (ref 0–1.9)
BUN SERPL-MCNC: 13 MG/DL (ref 6–20)
CALCIUM SERPL-MCNC: 8.1 MG/DL (ref 8.7–10.5)
CHLORIDE SERPL-SCNC: 105 MMOL/L (ref 95–110)
CO2 SERPL-SCNC: 20 MMOL/L (ref 23–29)
CREAT SERPL-MCNC: 1 MG/DL (ref 0.5–1.4)
DIFFERENTIAL METHOD: ABNORMAL
EOSINOPHIL # BLD AUTO: 0 K/UL (ref 0–0.5)
EOSINOPHIL NFR BLD: 0.2 % (ref 0–8)
ERYTHROCYTE [DISTWIDTH] IN BLOOD BY AUTOMATED COUNT: 14.1 % (ref 11.5–14.5)
EST. GFR  (AFRICAN AMERICAN): >60 ML/MIN/1.73 M^2
EST. GFR  (NON AFRICAN AMERICAN): >60 ML/MIN/1.73 M^2
GLUCOSE SERPL-MCNC: 132 MG/DL (ref 70–110)
HCT VFR BLD AUTO: 34.5 % (ref 40–54)
HGB BLD-MCNC: 10.3 G/DL (ref 14–18)
IMM GRANULOCYTES # BLD AUTO: 0.19 K/UL (ref 0–0.04)
IMM GRANULOCYTES NFR BLD AUTO: 1.3 % (ref 0–0.5)
LYMPHOCYTES # BLD AUTO: 0.7 K/UL (ref 1–4.8)
LYMPHOCYTES NFR BLD: 4.6 % (ref 18–48)
MAGNESIUM SERPL-MCNC: 1.8 MG/DL (ref 1.6–2.6)
MCH RBC QN AUTO: 29.9 PG (ref 27–31)
MCHC RBC AUTO-ENTMCNC: 29.9 G/DL (ref 32–36)
MCV RBC AUTO: 100 FL (ref 82–98)
MONOCYTES # BLD AUTO: 0.8 K/UL (ref 0.3–1)
MONOCYTES NFR BLD: 5.2 % (ref 4–15)
NEUTROPHILS # BLD AUTO: 13.2 K/UL (ref 1.8–7.7)
NEUTROPHILS NFR BLD: 88.4 % (ref 38–73)
NRBC BLD-RTO: 0 /100 WBC
PHOSPHATE SERPL-MCNC: 2.4 MG/DL (ref 2.7–4.5)
PLATELET # BLD AUTO: 222 K/UL (ref 150–350)
PMV BLD AUTO: 10.1 FL (ref 9.2–12.9)
POTASSIUM SERPL-SCNC: 4.7 MMOL/L (ref 3.5–5.1)
RBC # BLD AUTO: 3.44 M/UL (ref 4.6–6.2)
SODIUM SERPL-SCNC: 132 MMOL/L (ref 136–145)
WBC # BLD AUTO: 14.89 K/UL (ref 3.9–12.7)

## 2019-12-29 PROCEDURE — 20600001 HC STEP DOWN PRIVATE ROOM

## 2019-12-29 PROCEDURE — 84100 ASSAY OF PHOSPHORUS: CPT

## 2019-12-29 PROCEDURE — 85025 COMPLETE CBC W/AUTO DIFF WBC: CPT

## 2019-12-29 PROCEDURE — 25000003 PHARM REV CODE 250: Performed by: STUDENT IN AN ORGANIZED HEALTH CARE EDUCATION/TRAINING PROGRAM

## 2019-12-29 PROCEDURE — 83735 ASSAY OF MAGNESIUM: CPT

## 2019-12-29 PROCEDURE — S4991 NICOTINE PATCH NONLEGEND: HCPCS | Performed by: STUDENT IN AN ORGANIZED HEALTH CARE EDUCATION/TRAINING PROGRAM

## 2019-12-29 PROCEDURE — 63600175 PHARM REV CODE 636 W HCPCS: Performed by: SURGERY

## 2019-12-29 PROCEDURE — 25000003 PHARM REV CODE 250: Performed by: SURGERY

## 2019-12-29 PROCEDURE — 63600175 PHARM REV CODE 636 W HCPCS: Performed by: STUDENT IN AN ORGANIZED HEALTH CARE EDUCATION/TRAINING PROGRAM

## 2019-12-29 PROCEDURE — 94761 N-INVAS EAR/PLS OXIMETRY MLT: CPT

## 2019-12-29 PROCEDURE — 94640 AIRWAY INHALATION TREATMENT: CPT

## 2019-12-29 PROCEDURE — C9113 INJ PANTOPRAZOLE SODIUM, VIA: HCPCS | Performed by: STUDENT IN AN ORGANIZED HEALTH CARE EDUCATION/TRAINING PROGRAM

## 2019-12-29 PROCEDURE — 25000242 PHARM REV CODE 250 ALT 637 W/ HCPCS: Performed by: SURGERY

## 2019-12-29 PROCEDURE — 36415 COLL VENOUS BLD VENIPUNCTURE: CPT

## 2019-12-29 PROCEDURE — 99900035 HC TECH TIME PER 15 MIN (STAT)

## 2019-12-29 PROCEDURE — 94664 DEMO&/EVAL PT USE INHALER: CPT

## 2019-12-29 PROCEDURE — 80048 BASIC METABOLIC PNL TOTAL CA: CPT

## 2019-12-29 RX ORDER — LIDOCAINE AND PRILOCAINE 25; 25 MG/G; MG/G
CREAM TOPICAL ONCE
Status: COMPLETED | OUTPATIENT
Start: 2019-12-29 | End: 2019-12-29

## 2019-12-29 RX ORDER — LANOLIN ALCOHOL/MO/W.PET/CERES
400 CREAM (GRAM) TOPICAL ONCE
Status: COMPLETED | OUTPATIENT
Start: 2019-12-29 | End: 2019-12-29

## 2019-12-29 RX ADMIN — POTASSIUM CHLORIDE, DEXTROSE MONOHYDRATE AND SODIUM CHLORIDE: 150; 5; 450 INJECTION, SOLUTION INTRAVENOUS at 01:12

## 2019-12-29 RX ADMIN — PANTOPRAZOLE SODIUM 40 MG: 40 INJECTION, POWDER, FOR SOLUTION INTRAVENOUS at 08:12

## 2019-12-29 RX ADMIN — SODIUM CHLORIDE 1000 ML: 0.9 INJECTION, SOLUTION INTRAVENOUS at 12:12

## 2019-12-29 RX ADMIN — POTASSIUM CHLORIDE, DEXTROSE MONOHYDRATE AND SODIUM CHLORIDE: 150; 5; 450 INJECTION, SOLUTION INTRAVENOUS at 05:12

## 2019-12-29 RX ADMIN — ACETAMINOPHEN 650 MG: 325 TABLET ORAL at 01:12

## 2019-12-29 RX ADMIN — Medication 400 MG: at 06:12

## 2019-12-29 RX ADMIN — ACETAMINOPHEN 650 MG: 325 TABLET ORAL at 05:12

## 2019-12-29 RX ADMIN — Medication 1 PATCH: at 08:12

## 2019-12-29 RX ADMIN — PREDNISONE 5 MG: 5 TABLET ORAL at 08:12

## 2019-12-29 RX ADMIN — HEPARIN SODIUM 5000 UNITS: 5000 INJECTION, SOLUTION INTRAVENOUS; SUBCUTANEOUS at 10:12

## 2019-12-29 RX ADMIN — SODIUM PHOSPHATE, MONOBASIC, MONOHYDRATE 30 MMOL: 276; 142 INJECTION, SOLUTION INTRAVENOUS at 08:12

## 2019-12-29 RX ADMIN — SODIUM CHLORIDE 875 ML: 0.9 INJECTION, SOLUTION INTRAVENOUS at 06:12

## 2019-12-29 RX ADMIN — LIDOCAINE AND PRILOCAINE: 25; 25 CREAM TOPICAL at 02:12

## 2019-12-29 RX ADMIN — PROMETHAZINE HYDROCHLORIDE 6.25 MG: 25 INJECTION INTRAMUSCULAR; INTRAVENOUS at 11:12

## 2019-12-29 RX ADMIN — IPRATROPIUM BROMIDE AND ALBUTEROL SULFATE 3 ML: .5; 3 SOLUTION RESPIRATORY (INHALATION) at 01:12

## 2019-12-29 RX ADMIN — SODIUM CHLORIDE 1000 ML: 0.9 INJECTION, SOLUTION INTRAVENOUS at 03:12

## 2019-12-29 RX ADMIN — ACETAMINOPHEN 650 MG: 325 TABLET ORAL at 10:12

## 2019-12-29 RX ADMIN — SODIUM CHLORIDE 500 ML: 0.9 INJECTION, SOLUTION INTRAVENOUS at 10:12

## 2019-12-29 RX ADMIN — LISINOPRIL 10 MG: 10 TABLET ORAL at 08:12

## 2019-12-29 RX ADMIN — HEPARIN SODIUM 5000 UNITS: 5000 INJECTION, SOLUTION INTRAVENOUS; SUBCUTANEOUS at 01:12

## 2019-12-29 RX ADMIN — IPRATROPIUM BROMIDE AND ALBUTEROL SULFATE 3 ML: .5; 3 SOLUTION RESPIRATORY (INHALATION) at 07:12

## 2019-12-29 RX ADMIN — POTASSIUM CHLORIDE, DEXTROSE MONOHYDRATE AND SODIUM CHLORIDE: 150; 5; 450 INJECTION, SOLUTION INTRAVENOUS at 08:12

## 2019-12-29 RX ADMIN — IPRATROPIUM BROMIDE AND ALBUTEROL SULFATE 3 ML: .5; 3 SOLUTION RESPIRATORY (INHALATION) at 05:12

## 2019-12-29 RX ADMIN — HEPARIN SODIUM 5000 UNITS: 5000 INJECTION, SOLUTION INTRAVENOUS; SUBCUTANEOUS at 05:12

## 2019-12-29 RX ADMIN — SODIUM CHLORIDE 1000 ML: 0.9 INJECTION, SOLUTION INTRAVENOUS at 05:12

## 2019-12-29 NOTE — PLAN OF CARE
Plan of acre reviewed with pt: AAOx4, on RA, VS stable. Midline with Jaime CDI YUKI. Urostomy with yellow urine. Ileostomy intact with high output of green liquid stool. 2L of NS given per PRN order to replace ostomy output. No complaints of pain. Complained of nausea intermittently, nausea under control with Zofran and Phenergan, no emesis during shift. Sat up on the chair for several hours.  Call light in reach. [facility]

## 2019-12-29 NOTE — PLAN OF CARE
VS stable. Telemetry= NSR 90-80s. C/o nausea before midnight, Phenergan given IVPB. No c/o nausea since then. IVFs D5W1/2NS with 20meq KCL at 150ml/hr. Abdominal midline incision with staples YUKI, dry, intact. Urostomy draining clear, yellow urine. Ileostomy draining large amount of thin, green liquid stool. Bolus of NS given per PRN order. N other complaints overnight. Will continue to monitor.

## 2019-12-29 NOTE — PROGRESS NOTES
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 Also on prednisone as part of chemo. Getting Neupogen.  Pt states he ate last night around 10p and just after that noted severe abdominal pain and nausea- had multiple episodes of emesis NBNB. Tried his anti-nausea medication but did not help so he went to ED for evaluation. Last BM was yesterday. No fevers at home. Pain has not improved. At OSH had CTabd/pelvis which revealed high grade sbo concerning for internal hernia with closed loop as well as moderate left hydronephrosis and significant perinephric edema with focal narrowing of L ureter as it enters possible internal hernia. WBC at OSH 35.5 and sodium 125 lactate 2.3.    Post-Op Info:  Procedure(s) (LRB):  LAPAROTOMY, EXPLORATORY with  Small bowel resection and Ileostomy creation. (N/A)  URETERONEOCYSTOSTOMY reimplant to conduit (Left)  EXCISION, SMALL INTESTINE   7 Days Post-Op     Interval History: Doing well. No nausea or vomiting. Lots of gas and high output from ileostomy. Receiving replacement fluid for 4L out of ileostomy. Good uop via conduit.     Medications:  Continuous Infusions:   dextrose 5 % and 0.45 % NaCl with KCl 20 mEq 150 mL/hr at 12/29/19 0546     Scheduled Meds:   acetaminophen  650 mg Oral Q8H    albuterol-ipratropium  3 mL Nebulization Q4H WAKE    heparin (porcine)  5,000 Units Subcutaneous Q8H    lisinopril  10 mg Oral Daily    nicotine  1 patch Transdermal Daily    pantoprazole  40 mg Intravenous BID    predniSONE  5 mg Oral BID    scopolamine  1 patch Transdermal Q3 Days    sodium phosphate IVPB  30 mmol Intravenous Once     PRN Meds:calcium carbonate, diphenhydrAMINE,  ondansetron, oxyCODONE, oxyCODONE, promethazine (PHENERGAN) IVPB, sodium chloride 0.9%, sodium chloride 0.9%, sodium chloride 0.9%     Review of patient's allergies indicates:  No Known Allergies  Objective:     Vital Signs (Most Recent):  Temp: 97.7 °F (36.5 °C) (12/29/19 0728)  Pulse: 79 (12/29/19 0728)  Resp: 18 (12/29/19 0728)  BP: 132/79 (12/29/19 0728)  SpO2: 95 % (12/29/19 0728) Vital Signs (24h Range):  Temp:  [97 °F (36.1 °C)-99.6 °F (37.6 °C)] 97.7 °F (36.5 °C)  Pulse:  [] 79  Resp:  [12-18] 18  SpO2:  [93 %-96 %] 95 %  BP: (117-137)/(75-91) 132/79     Weight: 78.5 kg (172 lb 15.9 oz)  Body mass index is 25.55 kg/m².    Intake/Output - Last 3 Shifts       12/27 0700 - 12/28 0659 12/28 0700 - 12/29 0659 12/29 0700 - 12/30 0659    P.O.  0     I.V. (mL/kg)  6955.4 (88.6) 185 (2.4)    IV Piggyback 100 4550 1000    Total Intake(mL/kg) 100 (1.3) 83543.4 (146.6) 1185 (15.1)    Urine (mL/kg/hr) 1050 (0.6) 1650 (0.9)     Emesis/NG output 0      Drains 20      Stool 2050 3950     Total Output 3120 5600     Net -3020 +5905.4 +1185           Emesis Occurrence 1 x            Physical Exam   Constitutional: He appears well-developed and well-nourished. No distress.   HENT:   Head: Normocephalic and atraumatic.   Cardiovascular: Normal rate and regular rhythm.   Pulmonary/Chest: Effort normal. No respiratory distress.   Abdominal:   Soft, mildly distended, nontender  Ileostomy site LLQ, green liquid in ostomy bag.   Midline incision with staples, c/d/i  Existing ureterostomy site RLQ, with red rubber catheter and stent, urine present.        Significant Labs:  CBC:   Recent Labs   Lab 12/29/19  0356   WBC 14.89*   RBC 3.44*   HGB 10.3*   HCT 34.5*      *   MCH 29.9   MCHC 29.9*     CMP:   Recent Labs   Lab 12/27/19  0457  12/29/19  0356      < > 132*   CALCIUM 9.4   < > 8.1*   ALBUMIN 2.6*  --   --    PROT 7.0  --   --    *   < > 132*   K 4.0   < > 4.7   CO2 32*   < > 20*   CL 86*   < >  105   BUN 37*   < > 13   CREATININE 1.6*   < > 1.0   ALKPHOS 78  --   --    ALT 9*  --   --    AST 11  --   --    BILITOT 0.4  --   --     < > = values in this interval not displayed.       Significant Diagnostics:  None     Assessment/Plan:     Left ureteral injury  Appreciate urology assistance     Abdominal pain  53yo M with metastatic prostate cancer s/p radical cystectomy with urostomy on chemo with closed loop obstruction. Now s/p ex-lap 12/22 with resection of segment of necrotic bowel. Iatrogenic L ureter injury now s/p reimplantation    - will advance to CLD.   - mIVF d5 125, replace ileostomy losses  - IV abx - zosyn started 12/22, stopped 12/28. Wbc downtrending to 14.   - BE removed 12/27  - Strict I/O, follow ileostomy output - may need boluses for large output   - PRN pain/nausea control PO  - GI ppx, DVT ppx  - PT/OT, ambulate 3x per day  - dispo: advancing to clears, follow ileostomy output,         Jean Sood MD  General Surgery  [facility]

## 2019-12-29 NOTE — ASSESSMENT & PLAN NOTE
53yo M with metastatic prostate cancer s/p radical cystectomy with urostomy on chemo with closed loop obstruction. Now s/p ex-lap 12/22 with resection of segment of necrotic bowel. Iatrogenic L ureter injury now s/p reimplantation    - will advance to CLD.   - mIVF d5 125, replace ileostomy losses  - IV abx - zosyn started 12/22, stopped 12/28. Wbc downtrending to 14.   - BE removed 12/27  - Strict I/O, follow ileostomy output - may need boluses for large output   - PRN pain/nausea control PO  - GI ppx, DVT ppx  - PT/OT, ambulate 3x per day  - dispo: advancing to clears, follow ileostomy output,

## 2019-12-29 NOTE — SUBJECTIVE & OBJECTIVE
Interval History: Doing well. No nausea or vomiting. Lots of gas and high output from ileostomy. Receiving replacement fluid for 4L out of ileostomy. Good uop via conduit.     Medications:  Continuous Infusions:   dextrose 5 % and 0.45 % NaCl with KCl 20 mEq 150 mL/hr at 12/29/19 0546     Scheduled Meds:   acetaminophen  650 mg Oral Q8H    albuterol-ipratropium  3 mL Nebulization Q4H WAKE    heparin (porcine)  5,000 Units Subcutaneous Q8H    lisinopril  10 mg Oral Daily    nicotine  1 patch Transdermal Daily    pantoprazole  40 mg Intravenous BID    predniSONE  5 mg Oral BID    scopolamine  1 patch Transdermal Q3 Days    sodium phosphate IVPB  30 mmol Intravenous Once     PRN Meds:calcium carbonate, diphenhydrAMINE, ondansetron, oxyCODONE, oxyCODONE, promethazine (PHENERGAN) IVPB, sodium chloride 0.9%, sodium chloride 0.9%, sodium chloride 0.9%     Review of patient's allergies indicates:  No Known Allergies  Objective:     Vital Signs (Most Recent):  Temp: 97.7 °F (36.5 °C) (12/29/19 0728)  Pulse: 79 (12/29/19 0728)  Resp: 18 (12/29/19 0728)  BP: 132/79 (12/29/19 0728)  SpO2: 95 % (12/29/19 0728) Vital Signs (24h Range):  Temp:  [97 °F (36.1 °C)-99.6 °F (37.6 °C)] 97.7 °F (36.5 °C)  Pulse:  [] 79  Resp:  [12-18] 18  SpO2:  [93 %-96 %] 95 %  BP: (117-137)/(75-91) 132/79     Weight: 78.5 kg (172 lb 15.9 oz)  Body mass index is 25.55 kg/m².    Intake/Output - Last 3 Shifts       12/27 0700 - 12/28 0659 12/28 0700 - 12/29 0659 12/29 0700 - 12/30 0659    P.O.  0     I.V. (mL/kg)  6955.4 (88.6) 185 (2.4)    IV Piggyback 100 4550 1000    Total Intake(mL/kg) 100 (1.3) 22013.4 (146.6) 1185 (15.1)    Urine (mL/kg/hr) 1050 (0.6) 1650 (0.9)     Emesis/NG output 0      Drains 20      Stool 2050 3950     Total Output 3120 5600     Net -3020 +5905.4 +1185           Emesis Occurrence 1 x            Physical Exam   Constitutional: He appears well-developed and well-nourished. No distress.   HENT:   Head:  Normocephalic and atraumatic.   Cardiovascular: Normal rate and regular rhythm.   Pulmonary/Chest: Effort normal. No respiratory distress.   Abdominal:   Soft, mildly distended, nontender  Ileostomy site LLQ, green liquid in ostomy bag.   Midline incision with staples, c/d/i  Existing ureterostomy site RLQ, with red rubber catheter and stent, urine present.        Significant Labs:  CBC:   Recent Labs   Lab 12/29/19  0356   WBC 14.89*   RBC 3.44*   HGB 10.3*   HCT 34.5*      *   MCH 29.9   MCHC 29.9*     CMP:   Recent Labs   Lab 12/27/19  0457  12/29/19  0356      < > 132*   CALCIUM 9.4   < > 8.1*   ALBUMIN 2.6*  --   --    PROT 7.0  --   --    *   < > 132*   K 4.0   < > 4.7   CO2 32*   < > 20*   CL 86*   < > 105   BUN 37*   < > 13   CREATININE 1.6*   < > 1.0   ALKPHOS 78  --   --    ALT 9*  --   --    AST 11  --   --    BILITOT 0.4  --   --     < > = values in this interval not displayed.       Significant Diagnostics:  None

## 2019-12-30 ENCOUNTER — TELEPHONE (OUTPATIENT)
Dept: SURGERY | Facility: CLINIC | Age: 54
End: 2019-12-30

## 2019-12-30 PROBLEM — E83.42 HYPOMAGNESEMIA: Status: ACTIVE | Noted: 2019-12-30

## 2019-12-30 PROBLEM — E87.1 HYPONATREMIA: Status: ACTIVE | Noted: 2019-12-30

## 2019-12-30 PROBLEM — I10 HYPERTENSION: Status: ACTIVE | Noted: 2019-12-30

## 2019-12-30 PROBLEM — F17.200 NICOTINE DEPENDENCE: Status: ACTIVE | Noted: 2019-12-30

## 2019-12-30 LAB
ANION GAP SERPL CALC-SCNC: 9 MMOL/L (ref 8–16)
BASOPHILS # BLD AUTO: 0.04 K/UL (ref 0–0.2)
BASOPHILS NFR BLD: 0.2 % (ref 0–1.9)
BUN SERPL-MCNC: 8 MG/DL (ref 6–20)
CALCIUM SERPL-MCNC: 8.5 MG/DL (ref 8.7–10.5)
CHLORIDE SERPL-SCNC: 102 MMOL/L (ref 95–110)
CO2 SERPL-SCNC: 18 MMOL/L (ref 23–29)
CREAT SERPL-MCNC: 0.8 MG/DL (ref 0.5–1.4)
DIFFERENTIAL METHOD: ABNORMAL
EOSINOPHIL # BLD AUTO: 0.1 K/UL (ref 0–0.5)
EOSINOPHIL NFR BLD: 0.5 % (ref 0–8)
ERYTHROCYTE [DISTWIDTH] IN BLOOD BY AUTOMATED COUNT: 13.7 % (ref 11.5–14.5)
EST. GFR  (AFRICAN AMERICAN): >60 ML/MIN/1.73 M^2
EST. GFR  (NON AFRICAN AMERICAN): >60 ML/MIN/1.73 M^2
GLUCOSE SERPL-MCNC: 116 MG/DL (ref 70–110)
HCT VFR BLD AUTO: 31.8 % (ref 40–54)
HGB BLD-MCNC: 10.3 G/DL (ref 14–18)
IMM GRANULOCYTES # BLD AUTO: 0.21 K/UL (ref 0–0.04)
IMM GRANULOCYTES NFR BLD AUTO: 1.3 % (ref 0–0.5)
LYMPHOCYTES # BLD AUTO: 0.8 K/UL (ref 1–4.8)
LYMPHOCYTES NFR BLD: 4.8 % (ref 18–48)
MAGNESIUM SERPL-MCNC: 1.2 MG/DL (ref 1.6–2.6)
MCH RBC QN AUTO: 30 PG (ref 27–31)
MCHC RBC AUTO-ENTMCNC: 32.4 G/DL (ref 32–36)
MCV RBC AUTO: 93 FL (ref 82–98)
MONOCYTES # BLD AUTO: 1.1 K/UL (ref 0.3–1)
MONOCYTES NFR BLD: 6.9 % (ref 4–15)
NEUTROPHILS # BLD AUTO: 14.1 K/UL (ref 1.8–7.7)
NEUTROPHILS NFR BLD: 86.3 % (ref 38–73)
NRBC BLD-RTO: 0 /100 WBC
PHOSPHATE SERPL-MCNC: 3.3 MG/DL (ref 2.7–4.5)
PLATELET # BLD AUTO: 322 K/UL (ref 150–350)
PMV BLD AUTO: 9.5 FL (ref 9.2–12.9)
POTASSIUM SERPL-SCNC: 4.1 MMOL/L (ref 3.5–5.1)
RBC # BLD AUTO: 3.43 M/UL (ref 4.6–6.2)
SODIUM SERPL-SCNC: 129 MMOL/L (ref 136–145)
WBC # BLD AUTO: 16.31 K/UL (ref 3.9–12.7)

## 2019-12-30 PROCEDURE — 94640 AIRWAY INHALATION TREATMENT: CPT

## 2019-12-30 PROCEDURE — 25000003 PHARM REV CODE 250: Performed by: PHYSICIAN ASSISTANT

## 2019-12-30 PROCEDURE — 83735 ASSAY OF MAGNESIUM: CPT

## 2019-12-30 PROCEDURE — 25000003 PHARM REV CODE 250: Performed by: SURGERY

## 2019-12-30 PROCEDURE — 80048 BASIC METABOLIC PNL TOTAL CA: CPT

## 2019-12-30 PROCEDURE — 25000242 PHARM REV CODE 250 ALT 637 W/ HCPCS: Performed by: SURGERY

## 2019-12-30 PROCEDURE — 99900035 HC TECH TIME PER 15 MIN (STAT)

## 2019-12-30 PROCEDURE — 25000003 PHARM REV CODE 250: Performed by: STUDENT IN AN ORGANIZED HEALTH CARE EDUCATION/TRAINING PROGRAM

## 2019-12-30 PROCEDURE — 63600175 PHARM REV CODE 636 W HCPCS: Performed by: STUDENT IN AN ORGANIZED HEALTH CARE EDUCATION/TRAINING PROGRAM

## 2019-12-30 PROCEDURE — C9113 INJ PANTOPRAZOLE SODIUM, VIA: HCPCS | Performed by: STUDENT IN AN ORGANIZED HEALTH CARE EDUCATION/TRAINING PROGRAM

## 2019-12-30 PROCEDURE — 94664 DEMO&/EVAL PT USE INHALER: CPT

## 2019-12-30 PROCEDURE — 36415 COLL VENOUS BLD VENIPUNCTURE: CPT

## 2019-12-30 PROCEDURE — 84100 ASSAY OF PHOSPHORUS: CPT

## 2019-12-30 PROCEDURE — 85025 COMPLETE CBC W/AUTO DIFF WBC: CPT

## 2019-12-30 PROCEDURE — 20600001 HC STEP DOWN PRIVATE ROOM

## 2019-12-30 PROCEDURE — 94761 N-INVAS EAR/PLS OXIMETRY MLT: CPT

## 2019-12-30 RX ORDER — OXYCODONE HYDROCHLORIDE 10 MG/1
10 TABLET ORAL EVERY 4 HOURS PRN
Status: DISCONTINUED | OUTPATIENT
Start: 2019-12-30 | End: 2020-01-01 | Stop reason: HOSPADM

## 2019-12-30 RX ORDER — DEXTROSE MONOHYDRATE, SODIUM CHLORIDE, AND POTASSIUM CHLORIDE 50; 1.49; 4.5 G/1000ML; G/1000ML; G/1000ML
INJECTION, SOLUTION INTRAVENOUS CONTINUOUS
Status: DISCONTINUED | OUTPATIENT
Start: 2019-12-30 | End: 2019-12-31

## 2019-12-30 RX ORDER — PREDNISONE 5 MG/1
10 TABLET ORAL EVERY MORNING
Status: DISCONTINUED | OUTPATIENT
Start: 2019-12-30 | End: 2020-01-01 | Stop reason: HOSPADM

## 2019-12-30 RX ORDER — SODIUM CHLORIDE 9 MG/ML
INJECTION, SOLUTION INTRAVENOUS CONTINUOUS
Status: DISCONTINUED | OUTPATIENT
Start: 2019-12-30 | End: 2019-12-30

## 2019-12-30 RX ORDER — LANOLIN ALCOHOL/MO/W.PET/CERES
800 CREAM (GRAM) TOPICAL DAILY
Status: DISCONTINUED | OUTPATIENT
Start: 2019-12-30 | End: 2020-01-01 | Stop reason: HOSPADM

## 2019-12-30 RX ORDER — ONDANSETRON 2 MG/ML
8 INJECTION INTRAMUSCULAR; INTRAVENOUS EVERY 6 HOURS PRN
Status: DISCONTINUED | OUTPATIENT
Start: 2019-12-30 | End: 2019-12-30

## 2019-12-30 RX ADMIN — ACETAMINOPHEN 650 MG: 325 TABLET ORAL at 09:12

## 2019-12-30 RX ADMIN — HEPARIN SODIUM 5000 UNITS: 5000 INJECTION, SOLUTION INTRAVENOUS; SUBCUTANEOUS at 04:12

## 2019-12-30 RX ADMIN — ONDANSETRON 8 MG: 2 INJECTION INTRAMUSCULAR; INTRAVENOUS at 01:12

## 2019-12-30 RX ADMIN — IPRATROPIUM BROMIDE AND ALBUTEROL SULFATE 3 ML: .5; 3 SOLUTION RESPIRATORY (INHALATION) at 07:12

## 2019-12-30 RX ADMIN — POTASSIUM CHLORIDE, DEXTROSE MONOHYDRATE AND SODIUM CHLORIDE: 150; 5; 450 INJECTION, SOLUTION INTRAVENOUS at 03:12

## 2019-12-30 RX ADMIN — HEPARIN SODIUM 5000 UNITS: 5000 INJECTION, SOLUTION INTRAVENOUS; SUBCUTANEOUS at 05:12

## 2019-12-30 RX ADMIN — PANTOPRAZOLE SODIUM 40 MG: 40 INJECTION, POWDER, FOR SOLUTION INTRAVENOUS at 08:12

## 2019-12-30 RX ADMIN — SODIUM CHLORIDE 300 ML: 0.9 INJECTION, SOLUTION INTRAVENOUS at 03:12

## 2019-12-30 RX ADMIN — POTASSIUM CHLORIDE, DEXTROSE MONOHYDRATE AND SODIUM CHLORIDE: 150; 5; 450 INJECTION, SOLUTION INTRAVENOUS at 04:12

## 2019-12-30 RX ADMIN — PROMETHAZINE HYDROCHLORIDE 6.25 MG: 25 INJECTION INTRAMUSCULAR; INTRAVENOUS at 05:12

## 2019-12-30 RX ADMIN — ACETAMINOPHEN 650 MG: 325 TABLET ORAL at 04:12

## 2019-12-30 RX ADMIN — PANTOPRAZOLE SODIUM 40 MG: 40 INJECTION, POWDER, FOR SOLUTION INTRAVENOUS at 09:12

## 2019-12-30 RX ADMIN — HEPARIN SODIUM 5000 UNITS: 5000 INJECTION, SOLUTION INTRAVENOUS; SUBCUTANEOUS at 09:12

## 2019-12-30 RX ADMIN — SODIUM CHLORIDE: 0.9 INJECTION, SOLUTION INTRAVENOUS at 08:12

## 2019-12-30 RX ADMIN — LISINOPRIL 10 MG: 10 TABLET ORAL at 08:12

## 2019-12-30 RX ADMIN — PREDNISONE 10 MG: 5 TABLET ORAL at 08:12

## 2019-12-30 RX ADMIN — Medication 800 MG: at 08:12

## 2019-12-30 NOTE — PLAN OF CARE
12/30/19 1440   Discharge Reassessment   Assessment Type Discharge Planning Reassessment   Provided patient/caregiver education on the expected discharge date and the discharge plan Yes   Do you have any problems affording any of your prescribed medications? No   Discharge Plan A Home Health   Discharge Plan B Home Health   DME Needed Upon Discharge  none   Anticipated Discharge Disposition Home   Post-Acute Status   Post-Acute Authorization Other   Other Status No Post-Acute Service Needs

## 2019-12-30 NOTE — NURSING
Dr. Faizan Parham notified of pts nausea and vomiting. Pt tolerating clear liquids all day until this evening. Pt became nauseous and required phenergan 6.25mg. Pt states that medication did not work as well as it usually does. 600 ml of vomit tonight. Pts ileostomy output has slowed down. Pt states that when ostomy output slows down, this is when he feels nauseous.  MD made aware of all of the above. Pt has zofran po ordered. Unable to tolerate anything by mouth at this time. Provider ordered zofran 8mg IVP. Also ordered NPO status for now. Will administer zofran and continue to monitor.

## 2019-12-30 NOTE — PROGRESS NOTES
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 Also on prednisone as part of chemo. Getting Neupogen.  Pt states he ate last night around 10p and just after that noted severe abdominal pain and nausea- had multiple episodes of emesis NBNB. Tried his anti-nausea medication but did not help so he went to ED for evaluation. Last BM was yesterday. No fevers at home. Pain has not improved. At OSH had CTabd/pelvis which revealed high grade sbo concerning for internal hernia with closed loop as well as moderate left hydronephrosis and significant perinephric edema with focal narrowing of L ureter as it enters possible internal hernia. WBC at OSH 35.5 and sodium 125 lactate 2.3.    Post-Op Info:  Procedure(s) (LRB):  LAPAROTOMY, EXPLORATORY with  Small bowel resection and Ileostomy creation. (N/A)  URETERONEOCYSTOSTOMY reimplant to conduit (Left)  EXCISION, SMALL INTESTINE   8 Days Post-Op     Interval History:   Patient seen and examined, no acute events overnight  +N/V  Ostomy with ~3L output  Afebrile/VSS    Medications:  Continuous Infusions:   sodium chloride 0.9%       Scheduled Meds:   acetaminophen  650 mg Oral Q8H    albuterol-ipratropium  3 mL Nebulization Q4H WAKE    heparin (porcine)  5,000 Units Subcutaneous Q8H    lisinopril  10 mg Oral Daily    nicotine  1 patch Transdermal Daily    pantoprazole  40 mg Intravenous BID    predniSONE  10 mg Oral QAM    scopolamine  1 patch Transdermal Q3 Days     PRN Meds:calcium carbonate, diphenhydrAMINE, oxyCODONE, oxyCODONE, promethazine (PHENERGAN) IVPB, sodium chloride 0.9%, sodium chloride 0.9%, sodium chloride 0.9%     Review of patient's allergies  indicates:  No Known Allergies  Objective:     Vital Signs (Most Recent):  Temp: 98.5 °F (36.9 °C) (12/30/19 0350)  Pulse: 83 (12/30/19 0708)  Resp: 18 (12/30/19 0350)  BP: (!) 167/102 (12/30/19 0538)  SpO2: 96 % (12/30/19 0350) Vital Signs (24h Range):  Temp:  [97.6 °F (36.4 °C)-99.1 °F (37.3 °C)] 98.5 °F (36.9 °C)  Pulse:  [] 83  Resp:  [14-18] 18  SpO2:  [95 %-99 %] 96 %  BP: (131-176)/() 167/102     Weight: 78.5 kg (172 lb 15.9 oz)  Body mass index is 25.55 kg/m².    Intake/Output - Last 3 Shifts       12/28 0700 - 12/29 0659 12/29 0700 - 12/30 0659 12/30 0700 - 12/31 0659    P.O. 0 840     I.V. (mL/kg) 6955.4 (88.6) 2615 (33.3)     IV Piggyback 4550 3775     Total Intake(mL/kg) 54240.4 (146.6) 7230 (92.1)     Urine (mL/kg/hr) 1650 (0.9) 3150 (1.7)     Emesis/NG output  600     Drains       Stool 3950 2825 500    Total Output 5600 6575 500    Net +5905.4 +655 -500                 Physical Exam   Constitutional: He appears well-developed and well-nourished. No distress.   HENT:   Head: Normocephalic and atraumatic.   Cardiovascular: Normal rate and regular rhythm.   Pulmonary/Chest: Effort normal. No respiratory distress.   Abdominal:   Soft, mildly distended, appropriate TTP  Ileostomy site LLQ, green/yellow liquid in ostomy bag.   Existing ureterostomy site RLQ, with red rubber catheter and stent, urine present.        Significant Labs:  CBC:   Recent Labs   Lab 12/30/19  0452   WBC 16.31*   RBC 3.43*   HGB 10.3*   HCT 31.8*      MCV 93   MCH 30.0   MCHC 32.4     CMP:   Recent Labs   Lab 12/27/19  0457  12/30/19 0452      < > 116*   CALCIUM 9.4   < > 8.5*   ALBUMIN 2.6*  --   --    PROT 7.0  --   --    *   < > 129*   K 4.0   < > 4.1   CO2 32*   < > 18*   CL 86*   < > 102   BUN 37*   < > 8   CREATININE 1.6*   < > 0.8   ALKPHOS 78  --   --    ALT 9*  --   --    AST 11  --   --    BILITOT 0.4  --   --     < > = values in this interval not displayed.     Assessment/Plan:      Hyponatremia  Replace     Hypomagnesemia  Replace     Hypertension  Lisinopril    Nicotine dependence  Patch     Left ureteral injury  Appreciate urology assistance   - continue red rubber catheter in stoma x 3 weeks  - continue single J left ureteral stent x 6 weeks    Abdominal pain  55yo M with metastatic prostate cancer s/p radical cystectomy with urostomy on chemo with closed loop obstruction. Now s/p ex-lap 12/22 with resection of segment of necrotic bowel. Iatrogenic L ureter injury now s/p reimplantation    - KUB this AM  - continue clears  - mIVF , replace ileostomy losses  - no abx   - BE removed 12/27  - Strict I/O, follow ileostomy output - may need boluses for large output   - PRN pain/nausea control PO - dc narcotics, zofran  - GI ppx, DVT ppx  - PT/OT, ambulate 3x per day        Kathe Frey PA-C   [id number]  General Surgery  [facility]

## 2019-12-30 NOTE — PLAN OF CARE
Plan of care reviewed with pt: AAOx4, on RA, VS stable. Midline with staples CDI. Ileostomy with high output of green liquid stool, replaced with NS bolus per order. Urostomy with yellow urine. Tolerated his clear liquid diet with no complaints of nausea. Ambulated in hallwayx1. Call light in reach. TM

## 2019-12-30 NOTE — ASSESSMENT & PLAN NOTE
Appreciate urology assistance   - continue red rubber catheter in stoma x 3 weeks  - continue single J left ureteral stent x 6 weeks

## 2019-12-30 NOTE — ASSESSMENT & PLAN NOTE
53 yo M with hx metastatic prostate cancer s/p b/l orchiectomy, bladder cancer s/p cystoprostatectomy with ileal conduit, had intraoperative left ureteral injury during SBR; underwent reimplant on 12/22    - Management of ileus per general surgery  - BE drain removed  - continue red rubber catheter in stoma x 3 weeks  - continue single J left ureteral stent x 6 weeks  - Wound Care consult for ostomy care  - strict I&O  - Ambulate TID  - remainder of care per primary

## 2019-12-30 NOTE — PLAN OF CARE
Pt AAOX4, follows commands, VSS.   Pts blood pressure elevated this am. 170's/100's.  Pt agitated and irritable d/t frequent stomach upset, nausea and vomiting.   Phenergan and 8mg dose of zofran given x1.   Little to no reliefe initially until around 4 am this morning.  Pts sister at bedside, encouraging pt to walk and move.   Pt refuses, stating he walked earlier in the day. Pt states that he is tired.   Midline abdominal incision YUKI with staples. Scant amount of drainage.  RLQ urostomy with good urine output. LUQ ileostomy with good liquid output.  Both requiring frequent emptying. Please refer to orders concerning fluid replacement.  Pt shifts weight in bed independently and encouraged to do so more often.  Afebrile overnight, free from falls, plan of care reviewed with pt and pts sister.   Both pt and family verbalize understanding.   Pt encouraged to nap until noon and set small goals to get up during the day.   Bed in low position, wheels locked, side rails up x2, call bell within reach.   Will continue to monitor.

## 2019-12-30 NOTE — PROGRESS NOTES
Wound care follow up.    Patient continues to have N/V and high output. Patient had Xray this am.     Ileostomy and urostomy pouch changed. Stent and red rubber remain in urostomy.   Mild peristomal excoriation to the ileostomy site. Skin prep applied.     Patient denies any further questions in regards to ostomy teaching.     Stapled midline cleansed and painted with betadine. Incision site well approximated with old bloody drainage note staple sites.     Wound care to follow PRN.  Blanquita Dubois RN CN [facility]   [phone]

## 2019-12-30 NOTE — NURSING
Dr. Herron made aware of pts hypertension. Pt blood pressure running 170's/100's. Pt has been vomiting overnight and irritable concerning no solid sleep. Pt has lisinopril 10mg po daily. MD aware and okay with lisinopril. No other orders at this time. MD also made aware of Mag level of 1.2. Will put in orders for replacement. Report given to TRISH Miranda. Message relayed to oncoming nurse.

## 2019-12-30 NOTE — PROGRESS NOTES
[facility]  Urology  Progress Note    Patient Name: Kiko Gruber  MRN: [id number]  Admission Date: 12/22/2019  Hospital Length of Stay: 8 days  Code Status: Full Code   Attending Provider: Ck Herron MD   Primary Care Physician: Lala Juarez MD    Subjective:     HPI:  55 yo M with hx of bladder cancer s/p cystoprostatectomy with ileal conduit, also metastatic prostate CA s/p bilateral orchiectomy, who was undergoing ex-lap with SBR for internal hernia, had intraoperative left ureteral injury; underwent reimplant of left ureter in conduit    Interval History: NAEON. Patient vomiting multiple times with NGT removed. UOP excellent, Cr stable and WNL.     Review of Systems  Objective:     Temp:  [97.6 °F (36.4 °C)-99.1 °F (37.3 °C)] 98.5 °F (36.9 °C)  Pulse:  [] 85  Resp:  [14-18] 18  SpO2:  [95 %-99 %] 96 %  BP: (131-176)/() 167/102     Body mass index is 25.55 kg/m².           Drains     Drain                 Ileostomy 12/22/19 1704 Standard (Ewelina, end) LUQ 7 days         Urostomy 12/22/19 1654 ileal conduit RUQ 7 days                Physical Exam   Constitutional: He is oriented to person, place, and time. He appears well-developed and well-nourished.   HENT:   Head: Normocephalic and atraumatic.   Eyes: Conjunctivae are normal.   Neck: Normal range of motion.   Cardiovascular: Normal rate.    Pulmonary/Chest: Effort normal. No respiratory distress.   Abdominal: Soft. He exhibits no distension.   midline incision c/d/i; BE x 1 with SS output; ileostomy appears pink, healthy   Genitourinary:   Genitourinary Comments: Urostomy pink, healthy; red rubber and single-J stent in place, ileostomy appliance over urostomy filled with urine;    Musculoskeletal: Normal range of motion.   Neurological: He is alert and oriented to person, place, and time.   Skin: Skin is warm and dry.     Psychiatric: He has a normal mood and affect. His behavior is normal. Judgment and thought  content normal.       Significant Labs:    BMP:  Recent Labs   Lab 12/28/19  0944 12/29/19  0356 12/30/19  0452   * 132* 129*   K 3.7 4.7 4.1   CL 95 105 102   CO2 27 20* 18*   BUN 24* 13 8   CREATININE 1.2 1.0 0.8   CALCIUM 8.9 8.1* 8.5*       CBC:   Recent Labs   Lab 12/27/19  0457 12/28/19  0944 12/29/19  0356   WBC 23.07*  23.07* 17.78* 14.89*   HGB 12.3*  12.3* 11.5* 10.3*   HCT 38.3*  38.3* 34.1* 34.5*     179 226 222       All pertinent labs results from the past 24 hours have been reviewed.    Significant Imaging:  All pertinent imaging results/findings from the past 24 hours have been reviewed.                  Assessment/Plan:     Left ureteral injury  53 yo M with hx metastatic prostate cancer s/p b/l orchiectomy, bladder cancer s/p cystoprostatectomy with ileal conduit, had intraoperative left ureteral injury during SBR; underwent reimplant on 12/22    - Management of ileus per general surgery  - BE drain removed  - continue red rubber catheter in stoma x 3 weeks  - continue single J left ureteral stent x 6 weeks  - Wound Care consult for ostomy care  - strict I&O  - Ambulate TID  - remainder of care per primary        VTE Risk Mitigation (From admission, onward)         Ordered     heparin (porcine) injection 5,000 Units  Every 8 hours      12/24/19 0923     Place sequential compression device  Until discontinued      12/22/19 1800     IP VTE HIGH RISK PATIENT  Once      12/22/19 1800                Christiano Cavazos MD  Urology  [facility]

## 2019-12-30 NOTE — SUBJECTIVE & OBJECTIVE
Interval History:   Patient seen and examined, no acute events overnight  +N/V  Ostomy with ~3L output  Afebrile/VSS    Medications:  Continuous Infusions:   sodium chloride 0.9%       Scheduled Meds:   acetaminophen  650 mg Oral Q8H    albuterol-ipratropium  3 mL Nebulization Q4H WAKE    heparin (porcine)  5,000 Units Subcutaneous Q8H    lisinopril  10 mg Oral Daily    nicotine  1 patch Transdermal Daily    pantoprazole  40 mg Intravenous BID    predniSONE  10 mg Oral QAM    scopolamine  1 patch Transdermal Q3 Days     PRN Meds:calcium carbonate, diphenhydrAMINE, oxyCODONE, oxyCODONE, promethazine (PHENERGAN) IVPB, sodium chloride 0.9%, sodium chloride 0.9%, sodium chloride 0.9%     Review of patient's allergies indicates:  No Known Allergies  Objective:     Vital Signs (Most Recent):  Temp: 98.5 °F (36.9 °C) (12/30/19 0350)  Pulse: 83 (12/30/19 0708)  Resp: 18 (12/30/19 0350)  BP: (!) 167/102 (12/30/19 0538)  SpO2: 96 % (12/30/19 0350) Vital Signs (24h Range):  Temp:  [97.6 °F (36.4 °C)-99.1 °F (37.3 °C)] 98.5 °F (36.9 °C)  Pulse:  [] 83  Resp:  [14-18] 18  SpO2:  [95 %-99 %] 96 %  BP: (131-176)/() 167/102     Weight: 78.5 kg (172 lb 15.9 oz)  Body mass index is 25.55 kg/m².    Intake/Output - Last 3 Shifts       12/28 0700 - 12/29 0659 12/29 0700 - 12/30 0659 12/30 0700 - 12/31 0659    P.O. 0 840     I.V. (mL/kg) 6955.4 (88.6) 2615 (33.3)     IV Piggyback 4550 3775     Total Intake(mL/kg) 87621.4 (146.6) 7230 (92.1)     Urine (mL/kg/hr) 1650 (0.9) 3150 (1.7)     Emesis/NG output  600     Drains       Stool 3950 2825 500    Total Output 5600 6575 500    Net +5905.4 +655 -500                 Physical Exam   Constitutional: He appears well-developed and well-nourished. No distress.   HENT:   Head: Normocephalic and atraumatic.   Cardiovascular: Normal rate and regular rhythm.   Pulmonary/Chest: Effort normal. No respiratory distress.   Abdominal:   Soft, mildly distended, appropriate  TTP  Ileostomy site LLQ, green/yellow liquid in ostomy bag.   Existing ureterostomy site RLQ, with red rubber catheter and stent, urine present.        Significant Labs:  CBC:   Recent Labs   Lab 12/30/19 0452   WBC 16.31*   RBC 3.43*   HGB 10.3*   HCT 31.8*      MCV 93   MCH 30.0   MCHC 32.4     CMP:   Recent Labs   Lab 12/27/19 0457 12/30/19 0452      < > 116*   CALCIUM 9.4   < > 8.5*   ALBUMIN 2.6*  --   --    PROT 7.0  --   --    *   < > 129*   K 4.0   < > 4.1   CO2 32*   < > 18*   CL 86*   < > 102   BUN 37*   < > 8   CREATININE 1.6*   < > 0.8   ALKPHOS 78  --   --    ALT 9*  --   --    AST 11  --   --    BILITOT 0.4  --   --     < > = values in this interval not displayed.

## 2019-12-30 NOTE — ASSESSMENT & PLAN NOTE
55yo M with metastatic prostate cancer s/p radical cystectomy with urostomy on chemo with closed loop obstruction. Now s/p ex-lap 12/22 with resection of segment of necrotic bowel. Iatrogenic L ureter injury now s/p reimplantation    - KUB this AM  - continue clears  - mIVF , replace ileostomy losses  - no abx   - BE removed 12/27  - Strict I/O, follow ileostomy output - may need boluses for large output   - PRN pain/nausea control PO - dc narcotics, zofran  - GI ppx, DVT ppx  - PT/OT, ambulate 3x per day

## 2019-12-30 NOTE — SUBJECTIVE & OBJECTIVE
Interval History: NAEON. Patient vomiting multiple times with NGT removed. UOP excellent, Cr stable and WNL.     Review of Systems  Objective:     Temp:  [97.6 °F (36.4 °C)-99.1 °F (37.3 °C)] 98.5 °F (36.9 °C)  Pulse:  [] 85  Resp:  [14-18] 18  SpO2:  [95 %-99 %] 96 %  BP: (131-176)/() 167/102     Body mass index is 25.55 kg/m².           Drains     Drain                 Ileostomy 12/22/19 1704 Standard ([place]wel[place], end) LUQ 7 days         Urostomy 12/22/19 1654 ileal conduit RUQ 7 days                Physical Exam   Constitutional: He is oriented to person, place, and time. He appears well-developed and well-nourished.   HENT:   Head: Normocephalic and atraumatic.   Eyes: Conjunctivae are normal.   Neck: Normal range of motion.   Cardiovascular: Normal rate.    Pulmonary/Chest: Effort normal. No respiratory distress.   Abdominal: Soft. He exhibits no distension.   midline incision c/d/i; BE x 1 with SS output; ileostomy appears pink, healthy   Genitourinary:   Genitourinary Comments: Urostomy pink, healthy; red rubber and single-J stent in place, ileostomy appliance over urostomy filled with urine;    Musculoskeletal: Normal range of motion.   Neurological: He is alert and oriented to person, place, and time.   Skin: Skin is warm and dry.     Psychiatric: He has a normal mood and affect. His behavior is normal. Judgment and thought content normal.       Significant Labs:    BMP:  Recent Labs   Lab 12/28/19  0944 12/29/19  0356 12/30/19  0452   * 132* 129*   K 3.7 4.7 4.1   CL 95 105 102   CO2 27 20* 18*   BUN 24* 13 8   CREATININE 1.2 1.0 0.8   CALCIUM 8.9 8.1* 8.5*       CBC:   Recent Labs   Lab 12/27/19  0457 12/28/19  0944 12/29/19  0356   WBC 23.07*  23.07* 17.78* 14.89*   HGB 12.3*  12.3* 11.5* 10.3*   HCT 38.3*  38.3* 34.1* 34.5*     179 226 222       All pertinent labs results from the past 24 hours have been reviewed.    Significant Imaging:  All pertinent imaging results/findings  from the past 24 hours have been reviewed.

## 2019-12-31 PROBLEM — R23.9 ALTERATION IN SKIN INTEGRITY RELATED TO SURGICAL INCISION: Status: ACTIVE | Noted: 2019-12-31

## 2019-12-31 LAB
ANION GAP SERPL CALC-SCNC: 8 MMOL/L (ref 8–16)
ANION GAP SERPL CALC-SCNC: 9 MMOL/L (ref 8–16)
BASOPHILS # BLD AUTO: 0.05 K/UL (ref 0–0.2)
BASOPHILS NFR BLD: 0.5 % (ref 0–1.9)
BUN SERPL-MCNC: 11 MG/DL (ref 6–20)
BUN SERPL-MCNC: 12 MG/DL (ref 6–20)
CALCIUM SERPL-MCNC: 9.5 MG/DL (ref 8.7–10.5)
CALCIUM SERPL-MCNC: 9.7 MG/DL (ref 8.7–10.5)
CHLORIDE SERPL-SCNC: 95 MMOL/L (ref 95–110)
CHLORIDE SERPL-SCNC: 98 MMOL/L (ref 95–110)
CO2 SERPL-SCNC: 24 MMOL/L (ref 23–29)
CO2 SERPL-SCNC: 26 MMOL/L (ref 23–29)
CREAT SERPL-MCNC: 1 MG/DL (ref 0.5–1.4)
CREAT SERPL-MCNC: 1 MG/DL (ref 0.5–1.4)
DIFFERENTIAL METHOD: ABNORMAL
EOSINOPHIL # BLD AUTO: 0 K/UL (ref 0–0.5)
EOSINOPHIL NFR BLD: 0.3 % (ref 0–8)
ERYTHROCYTE [DISTWIDTH] IN BLOOD BY AUTOMATED COUNT: 14 % (ref 11.5–14.5)
EST. GFR  (AFRICAN AMERICAN): >60 ML/MIN/1.73 M^2
EST. GFR  (AFRICAN AMERICAN): >60 ML/MIN/1.73 M^2
EST. GFR  (NON AFRICAN AMERICAN): >60 ML/MIN/1.73 M^2
EST. GFR  (NON AFRICAN AMERICAN): >60 ML/MIN/1.73 M^2
GLUCOSE SERPL-MCNC: 108 MG/DL (ref 70–110)
GLUCOSE SERPL-MCNC: 98 MG/DL (ref 70–110)
HCT VFR BLD AUTO: 35.4 % (ref 40–54)
HGB BLD-MCNC: 11.5 G/DL (ref 14–18)
IMM GRANULOCYTES # BLD AUTO: 0.3 K/UL (ref 0–0.04)
IMM GRANULOCYTES NFR BLD AUTO: 2.8 % (ref 0–0.5)
LYMPHOCYTES # BLD AUTO: 1.3 K/UL (ref 1–4.8)
LYMPHOCYTES NFR BLD: 12.5 % (ref 18–48)
MAGNESIUM SERPL-MCNC: 1.3 MG/DL (ref 1.6–2.6)
MCH RBC QN AUTO: 29.7 PG (ref 27–31)
MCHC RBC AUTO-ENTMCNC: 32.5 G/DL (ref 32–36)
MCV RBC AUTO: 92 FL (ref 82–98)
MONOCYTES # BLD AUTO: 1 K/UL (ref 0.3–1)
MONOCYTES NFR BLD: 9.3 % (ref 4–15)
NEUTROPHILS # BLD AUTO: 8 K/UL (ref 1.8–7.7)
NEUTROPHILS NFR BLD: 74.6 % (ref 38–73)
NRBC BLD-RTO: 0 /100 WBC
PHOSPHATE SERPL-MCNC: 3.9 MG/DL (ref 2.7–4.5)
PLATELET # BLD AUTO: 387 K/UL (ref 150–350)
PMV BLD AUTO: 9.5 FL (ref 9.2–12.9)
POTASSIUM SERPL-SCNC: 4.6 MMOL/L (ref 3.5–5.1)
POTASSIUM SERPL-SCNC: 5.7 MMOL/L (ref 3.5–5.1)
RBC # BLD AUTO: 3.87 M/UL (ref 4.6–6.2)
SODIUM SERPL-SCNC: 130 MMOL/L (ref 136–145)
SODIUM SERPL-SCNC: 130 MMOL/L (ref 136–145)
WBC # BLD AUTO: 10.65 K/UL (ref 3.9–12.7)

## 2019-12-31 PROCEDURE — 63600175 PHARM REV CODE 636 W HCPCS: Performed by: STUDENT IN AN ORGANIZED HEALTH CARE EDUCATION/TRAINING PROGRAM

## 2019-12-31 PROCEDURE — 27000221 HC OXYGEN, UP TO 24 HOURS

## 2019-12-31 PROCEDURE — C9113 INJ PANTOPRAZOLE SODIUM, VIA: HCPCS | Performed by: STUDENT IN AN ORGANIZED HEALTH CARE EDUCATION/TRAINING PROGRAM

## 2019-12-31 PROCEDURE — 80048 BASIC METABOLIC PNL TOTAL CA: CPT

## 2019-12-31 PROCEDURE — 83735 ASSAY OF MAGNESIUM: CPT

## 2019-12-31 PROCEDURE — 85025 COMPLETE CBC W/AUTO DIFF WBC: CPT

## 2019-12-31 PROCEDURE — 36415 COLL VENOUS BLD VENIPUNCTURE: CPT

## 2019-12-31 PROCEDURE — 25000242 PHARM REV CODE 250 ALT 637 W/ HCPCS: Performed by: SURGERY

## 2019-12-31 PROCEDURE — 94640 AIRWAY INHALATION TREATMENT: CPT

## 2019-12-31 PROCEDURE — 25000003 PHARM REV CODE 250: Performed by: STUDENT IN AN ORGANIZED HEALTH CARE EDUCATION/TRAINING PROGRAM

## 2019-12-31 PROCEDURE — 80048 BASIC METABOLIC PNL TOTAL CA: CPT | Mod: 91

## 2019-12-31 PROCEDURE — 25000003 PHARM REV CODE 250: Performed by: PHYSICIAN ASSISTANT

## 2019-12-31 PROCEDURE — 99900035 HC TECH TIME PER 15 MIN (STAT)

## 2019-12-31 PROCEDURE — 94664 DEMO&/EVAL PT USE INHALER: CPT

## 2019-12-31 PROCEDURE — 20600001 HC STEP DOWN PRIVATE ROOM

## 2019-12-31 PROCEDURE — 84100 ASSAY OF PHOSPHORUS: CPT

## 2019-12-31 PROCEDURE — 25000003 PHARM REV CODE 250: Performed by: SURGERY

## 2019-12-31 PROCEDURE — 94761 N-INVAS EAR/PLS OXIMETRY MLT: CPT

## 2019-12-31 PROCEDURE — S4991 NICOTINE PATCH NONLEGEND: HCPCS | Performed by: STUDENT IN AN ORGANIZED HEALTH CARE EDUCATION/TRAINING PROGRAM

## 2019-12-31 RX ORDER — LOPERAMIDE HCL 1MG/7.5ML
2 LIQUID (ML) ORAL 2 TIMES DAILY
Status: DISCONTINUED | OUTPATIENT
Start: 2019-12-31 | End: 2020-01-01

## 2019-12-31 RX ORDER — SODIUM CHLORIDE, SODIUM LACTATE, POTASSIUM CHLORIDE, CALCIUM CHLORIDE 600; 310; 30; 20 MG/100ML; MG/100ML; MG/100ML; MG/100ML
INJECTION, SOLUTION INTRAVENOUS CONTINUOUS
Status: DISCONTINUED | OUTPATIENT
Start: 2019-12-31 | End: 2019-12-31

## 2019-12-31 RX ORDER — SIMETHICONE 80 MG
1 TABLET,CHEWABLE ORAL 3 TIMES DAILY PRN
Status: DISCONTINUED | OUTPATIENT
Start: 2019-12-31 | End: 2020-01-01 | Stop reason: HOSPADM

## 2019-12-31 RX ORDER — MICONAZOLE NITRATE 2 %
POWDER (GRAM) TOPICAL
Status: DISCONTINUED | OUTPATIENT
Start: 2019-12-31 | End: 2020-01-01 | Stop reason: HOSPADM

## 2019-12-31 RX ORDER — SODIUM CHLORIDE 9 MG/ML
INJECTION, SOLUTION INTRAVENOUS CONTINUOUS
Status: DISCONTINUED | OUTPATIENT
Start: 2019-12-31 | End: 2020-01-01

## 2019-12-31 RX ORDER — MAGNESIUM SULFATE HEPTAHYDRATE 40 MG/ML
2 INJECTION, SOLUTION INTRAVENOUS ONCE
Status: COMPLETED | OUTPATIENT
Start: 2019-12-31 | End: 2019-12-31

## 2019-12-31 RX ADMIN — CALCIUM CARBONATE (ANTACID) CHEW TAB 500 MG 500 MG: 500 CHEW TAB at 09:12

## 2019-12-31 RX ADMIN — PANTOPRAZOLE SODIUM 40 MG: 40 INJECTION, POWDER, FOR SOLUTION INTRAVENOUS at 08:12

## 2019-12-31 RX ADMIN — PANTOPRAZOLE SODIUM 40 MG: 40 INJECTION, POWDER, FOR SOLUTION INTRAVENOUS at 09:12

## 2019-12-31 RX ADMIN — Medication 1 PATCH: at 08:12

## 2019-12-31 RX ADMIN — SODIUM CHLORIDE 1000 ML: 0.9 INJECTION, SOLUTION INTRAVENOUS at 03:12

## 2019-12-31 RX ADMIN — Medication 800 MG: at 08:12

## 2019-12-31 RX ADMIN — ACETAMINOPHEN 650 MG: 325 TABLET ORAL at 05:12

## 2019-12-31 RX ADMIN — Medication 2 MG: at 09:12

## 2019-12-31 RX ADMIN — LISINOPRIL 10 MG: 10 TABLET ORAL at 08:12

## 2019-12-31 RX ADMIN — MICONAZOLE NITRATE: 20 POWDER TOPICAL at 03:12

## 2019-12-31 RX ADMIN — MAGNESIUM SULFATE IN WATER 2 G: 40 INJECTION, SOLUTION INTRAVENOUS at 08:12

## 2019-12-31 RX ADMIN — CALCIUM GLUCONATE 1000 MG: 98 INJECTION, SOLUTION INTRAVENOUS at 08:12

## 2019-12-31 RX ADMIN — ACETAMINOPHEN 650 MG: 325 TABLET ORAL at 03:12

## 2019-12-31 RX ADMIN — IPRATROPIUM BROMIDE AND ALBUTEROL SULFATE 3 ML: .5; 3 SOLUTION RESPIRATORY (INHALATION) at 04:12

## 2019-12-31 RX ADMIN — HEPARIN SODIUM 5000 UNITS: 5000 INJECTION, SOLUTION INTRAVENOUS; SUBCUTANEOUS at 05:12

## 2019-12-31 RX ADMIN — POTASSIUM CHLORIDE, DEXTROSE MONOHYDRATE AND SODIUM CHLORIDE: 150; 5; 450 INJECTION, SOLUTION INTRAVENOUS at 01:12

## 2019-12-31 RX ADMIN — IPRATROPIUM BROMIDE AND ALBUTEROL SULFATE 3 ML: .5; 3 SOLUTION RESPIRATORY (INHALATION) at 07:12

## 2019-12-31 RX ADMIN — SIMETHICONE CHEW TAB 80 MG 80 MG: 80 TABLET ORAL at 03:12

## 2019-12-31 RX ADMIN — SCOPALAMINE 1 PATCH: 1 PATCH, EXTENDED RELEASE TRANSDERMAL at 05:12

## 2019-12-31 RX ADMIN — SODIUM CHLORIDE: 0.9 INJECTION, SOLUTION INTRAVENOUS at 12:12

## 2019-12-31 RX ADMIN — HEPARIN SODIUM 5000 UNITS: 5000 INJECTION, SOLUTION INTRAVENOUS; SUBCUTANEOUS at 09:12

## 2019-12-31 RX ADMIN — SODIUM CHLORIDE 1000 ML: 0.9 INJECTION, SOLUTION INTRAVENOUS at 10:12

## 2019-12-31 RX ADMIN — PREDNISONE 10 MG: 5 TABLET ORAL at 08:12

## 2019-12-31 RX ADMIN — IPRATROPIUM BROMIDE AND ALBUTEROL SULFATE 3 ML: .5; 3 SOLUTION RESPIRATORY (INHALATION) at 11:12

## 2019-12-31 RX ADMIN — HEPARIN SODIUM 5000 UNITS: 5000 INJECTION, SOLUTION INTRAVENOUS; SUBCUTANEOUS at 03:12

## 2019-12-31 RX ADMIN — ACETAMINOPHEN 650 MG: 325 TABLET ORAL at 09:12

## 2019-12-31 RX ADMIN — SODIUM CHLORIDE, SODIUM LACTATE, POTASSIUM CHLORIDE, AND CALCIUM CHLORIDE: .6; .31; .03; .02 INJECTION, SOLUTION INTRAVENOUS at 05:12

## 2019-12-31 NOTE — PLAN OF CARE
BILL ludwigd with patient who verbalize understanding. AAO, VSS. No pain during the shift. Complaining of a lot of gas in his ileostomy bag. High Ileostomy output recorded, MD aware and fluid replace 1:1. NS @ 125. Urostomy to drainage bag, with clear yellow urine. Abd incision with betadine and mepilex, done per wound care nurse. On regular diet, tolerated well. Patient independent. Ambulated around the unit. Free of falls or injuries.

## 2019-12-31 NOTE — PROGRESS NOTES
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 Also on prednisone as part of chemo. Getting Neupogen.  Pt states he ate last night around 10p and just after that noted severe abdominal pain and nausea- had multiple episodes of emesis NBNB. Tried his anti-nausea medication but did not help so he went to ED for evaluation. Last BM was yesterday. No fevers at home. Pain has not improved. At OSH had CTabd/pelvis which revealed high grade sbo concerning for internal hernia with closed loop as well as moderate left hydronephrosis and significant perinephric edema with focal narrowing of L ureter as it enters possible internal hernia. WBC at OSH 35.5 and sodium 125 lactate 2.3.    Post-Op Info:  Procedure(s) (LRB):  LAPAROTOMY, EXPLORATORY with  Small bowel resection and Ileostomy creation. (N/A)  URETERONEOCYSTOSTOMY reimplant to conduit (Left)  EXCISION, SMALL INTESTINE   9 Days Post-Op     Interval History:   Patient seen and examined, no acute events overnight  Denies N/V  Ostomy with ~4L output  Afebrile/VSS    Medications:  Continuous Infusions:    Scheduled Meds:   acetaminophen  650 mg Oral Q8H    albuterol-ipratropium  3 mL Nebulization Q4H WAKE    calcium gluconate IVPB  1,000 mg Intravenous Once    heparin (porcine)  5,000 Units Subcutaneous Q8H    lisinopril  10 mg Oral Daily    magnesium oxide  800 mg Oral Daily    magnesium sulfate IVPB  2 g Intravenous Once    nicotine  1 patch Transdermal Daily    pantoprazole  40 mg Intravenous BID    predniSONE  10 mg Oral QAM    scopolamine  1 patch Transdermal Q3 Days    sodium chloride 0.9%  1,000 mL Intravenous Once     PRN Meds:calcium carbonate,  diphenhydrAMINE, oxyCODONE, oxyCODONE, promethazine (PHENERGAN) IVPB, sodium chloride 0.9%, sodium chloride 0.9%, sodium chloride 0.9%     Review of patient's allergies indicates:  No Known Allergies  Objective:     Vital Signs (Most Recent):  Temp: 97.6 °F (36.4 °C) (12/31/19 0351)  Pulse: 90 (12/31/19 0712)  Resp: 18 (12/31/19 0712)  BP: (!) 128/91 (12/31/19 0351)  SpO2: 98 % (12/31/19 0712) Vital Signs (24h Range):  Temp:  [97.6 °F (36.4 °C)-99 °F (37.2 °C)] 97.6 °F (36.4 °C)  Pulse:  [] 90  Resp:  [16-18] 18  SpO2:  [95 %-98 %] 98 %  BP: (100-176)/() 128/91     Weight: 78.5 kg (172 lb 15.9 oz)  Body mass index is 25.55 kg/m².    Intake/Output - Last 3 Shifts       12/29 0700 - 12/30 0659 12/30 0700 - 12/31 0659 12/31 0700 - 01/01 0659    P.O. 840      I.V. (mL/kg) 2615 (33.3) 1500 (19.1)     Other  800     IV Piggyback 3775      Total Intake(mL/kg) 7230 (92.1) 2300 (29.3)     Urine (mL/kg/hr) 3150 (1.7) 2150 (1.1)     Emesis/NG output 600      Stool 2825 4300     Total Output 6575 6450     Net +655 -4150            Stool Occurrence  1 x           Physical Exam   Constitutional: He appears well-developed and well-nourished. No distress.   HENT:   Head: Normocephalic and atraumatic.   Cardiovascular: Normal rate and regular rhythm.   Pulmonary/Chest: Effort normal. No respiratory distress.   Abdominal:   Soft, mildly distended, appropriate TTP  Ileostomy site LLQ, brown liquid and gas in ostomy bag.   Existing ureterostomy site RLQ, with red rubber catheter and stent, urine present.        Significant Labs:  CBC:   Recent Labs   Lab 12/31/19 0450   WBC 10.65   RBC 3.87*   HGB 11.5*   HCT 35.4*   *   MCV 92   MCH 29.7   MCHC 32.5     CMP:   Recent Labs   Lab 12/27/19 0457 12/31/19 0450      < > 108   CALCIUM 9.4   < > 9.5   ALBUMIN 2.6*  --   --    PROT 7.0  --   --    *   < > 130*   K 4.0   < > 5.7*   CO2 32*   < > 24   CL 86*   < > 98   BUN 37*   < > 11   CREATININE 1.6*   < >  1.0   ALKPHOS 78  --   --    ALT 9*  --   --    AST 11  --   --    BILITOT 0.4  --   --     < > = values in this interval not displayed.     Assessment/Plan:     Hyponatremia  Replace     Hypomagnesemia  Replace     Hypertension  Lisinopril    Nicotine dependence  Patch     Left ureteral injury  Appreciate urology assistance   - continue red rubber catheter in stoma x 3 weeks  - continue single J left ureteral stent x 6 weeks    Abdominal pain  55yo M with metastatic prostate cancer s/p radical cystectomy with urostomy on chemo with closed loop obstruction. Now s/p ex-lap 12/22 with resection of segment of necrotic bowel. Iatrogenic L ureter injury now s/p reimplantation    - Regular diet  - no abx   - BE removed 12/27  - Strict I/O, follow ileostomy output - may need boluses for large output   - PRN pain/nausea control PO - dc narcotics, zofran  - GI ppx, DVT ppx  - PT/OT, ambulate 3x per day  - plan for dc home tomorrow        Kathe Frey PA-C   [id number]  General Surgery  [facility]

## 2019-12-31 NOTE — SUBJECTIVE & OBJECTIVE
Interval History:   Patient seen and examined, no acute events overnight  Denies N/V  Ostomy with ~4L output  Afebrile/VSS    Medications:  Continuous Infusions:    Scheduled Meds:   acetaminophen  650 mg Oral Q8H    albuterol-ipratropium  3 mL Nebulization Q4H WAKE    calcium gluconate IVPB  1,000 mg Intravenous Once    heparin (porcine)  5,000 Units Subcutaneous Q8H    lisinopril  10 mg Oral Daily    magnesium oxide  800 mg Oral Daily    magnesium sulfate IVPB  2 g Intravenous Once    nicotine  1 patch Transdermal Daily    pantoprazole  40 mg Intravenous BID    predniSONE  10 mg Oral QAM    scopolamine  1 patch Transdermal Q3 Days    sodium chloride 0.9%  1,000 mL Intravenous Once     PRN Meds:calcium carbonate, diphenhydrAMINE, oxyCODONE, oxyCODONE, promethazine (PHENERGAN) IVPB, sodium chloride 0.9%, sodium chloride 0.9%, sodium chloride 0.9%     Review of patient's allergies indicates:  No Known Allergies  Objective:     Vital Signs (Most Recent):  Temp: 97.6 °F (36.4 °C) (12/31/19 0351)  Pulse: 90 (12/31/19 0712)  Resp: 18 (12/31/19 0712)  BP: (!) 128/91 (12/31/19 0351)  SpO2: 98 % (12/31/19 0712) Vital Signs (24h Range):  Temp:  [97.6 °F (36.4 °C)-99 °F (37.2 °C)] 97.6 °F (36.4 °C)  Pulse:  [] 90  Resp:  [16-18] 18  SpO2:  [95 %-98 %] 98 %  BP: (100-176)/() 128/91     Weight: 78.5 kg (172 lb 15.9 oz)  Body mass index is 25.55 kg/m².    Intake/Output - Last 3 Shifts       12/29 0700 - 12/30 0659 12/30 0700 - 12/31 0659 12/31 0700 - 01/01 0659    P.O. 840      I.V. (mL/kg) 2615 (33.3) 1500 (19.1)     Other  800     IV Piggyback 3775      Total Intake(mL/kg) 7230 (92.1) 2300 (29.3)     Urine (mL/kg/hr) 3150 (1.7) 2150 (1.1)     Emesis/NG output 600      Stool 2825 4300     Total Output 6575 6450     Net +655 -4150            Stool Occurrence  1 x           Physical Exam   Constitutional: He appears well-developed and well-nourished. No distress.   HENT:   Head: Normocephalic and  atraumatic.   Cardiovascular: Normal rate and regular rhythm.   Pulmonary/Chest: Effort normal. No respiratory distress.   Abdominal:   Soft, mildly distended, appropriate TTP  Ileostomy site LLQ, brown liquid and gas in ostomy bag.   Existing ureterostomy site RLQ, with red rubber catheter and stent, urine present.        Significant Labs:  CBC:   Recent Labs   Lab 12/31/19 0450   WBC 10.65   RBC 3.87*   HGB 11.5*   HCT 35.4*   *   MCV 92   MCH 29.7   MCHC 32.5     CMP:   Recent Labs   Lab 12/27/19 0457  12/31/19 0450      < > 108   CALCIUM 9.4   < > 9.5   ALBUMIN 2.6*  --   --    PROT 7.0  --   --    *   < > 130*   K 4.0   < > 5.7*   CO2 32*   < > 24   CL 86*   < > 98   BUN 37*   < > 11   CREATININE 1.6*   < > 1.0   ALKPHOS 78  --   --    ALT 9*  --   --    AST 11  --   --    BILITOT 0.4  --   --     < > = values in this interval not displayed.

## 2019-12-31 NOTE — ASSESSMENT & PLAN NOTE
53yo M with metastatic prostate cancer s/p radical cystectomy with urostomy on chemo with closed loop obstruction. Now s/p ex-lap 12/22 with resection of segment of necrotic bowel. Iatrogenic L ureter injury now s/p reimplantation    - Regular diet  - no abx   - BE removed 12/27  - Strict I/O, follow ileostomy output - may need boluses for large output   - PRN pain/nausea control PO - dc narcotics, zofran  - GI ppx, DVT ppx  - PT/OT, ambulate 3x per day  - plan for dc home tomorrow

## 2019-12-31 NOTE — PLAN OF CARE
Patient remained AAOx3 throughout the shift; ambulated independently in the varela with family; up in the chair for a awhile. All schedules/PRN medications administered as ordered. NPO  throughout the day; regular diet ordered; Stand- by assistance provided with activity; ileostomy  and urostomy bag changed/ empted as needed. side rails up x2; call bell in place; bed in lowest, locked position; skid proof socks on; no evidence of skin breakdown; care plan explained to patient; no additional complaints at this time. Will continue routine plan of care.

## 2019-12-31 NOTE — PROGRESS NOTES
Ostomy follow up.    Urostomy and ileostomy intact. Patient continues to have high output from ileostomy. MDs are aware. Reviewed the I and O flow sheet and discussed close monitoring of outputs upon discharge and risk for dehydration. Patient will be going to stay with his sister and both verbalize understanding.   Midline staples cleansed with betadine and covered with foam.    Wound care to follow PRN.  Blanquita Dubois RN [facility]   [phone]

## 2020-01-01 VITALS
WEIGHT: 173 LBS | OXYGEN SATURATION: 98 % | RESPIRATION RATE: 16 BRPM | DIASTOLIC BLOOD PRESSURE: 64 MMHG | HEART RATE: 89 BPM | TEMPERATURE: 98 F | BODY MASS INDEX: 25.62 KG/M2 | HEIGHT: 69 IN | SYSTOLIC BLOOD PRESSURE: 98 MMHG

## 2020-01-01 LAB
ANION GAP SERPL CALC-SCNC: 9 MMOL/L (ref 8–16)
BASOPHILS # BLD AUTO: 0.06 K/UL (ref 0–0.2)
BASOPHILS NFR BLD: 0.6 % (ref 0–1.9)
BUN SERPL-MCNC: 12 MG/DL (ref 6–20)
CALCIUM SERPL-MCNC: 9.2 MG/DL (ref 8.7–10.5)
CHLORIDE SERPL-SCNC: 103 MMOL/L (ref 95–110)
CO2 SERPL-SCNC: 23 MMOL/L (ref 23–29)
CREAT SERPL-MCNC: 1 MG/DL (ref 0.5–1.4)
DIFFERENTIAL METHOD: ABNORMAL
EOSINOPHIL # BLD AUTO: 0 K/UL (ref 0–0.5)
EOSINOPHIL NFR BLD: 0.3 % (ref 0–8)
ERYTHROCYTE [DISTWIDTH] IN BLOOD BY AUTOMATED COUNT: 14.1 % (ref 11.5–14.5)
EST. GFR  (AFRICAN AMERICAN): >60 ML/MIN/1.73 M^2
EST. GFR  (NON AFRICAN AMERICAN): >60 ML/MIN/1.73 M^2
GLUCOSE SERPL-MCNC: 86 MG/DL (ref 70–110)
HCT VFR BLD AUTO: 34.3 % (ref 40–54)
HGB BLD-MCNC: 10.9 G/DL (ref 14–18)
IMM GRANULOCYTES # BLD AUTO: 0.42 K/UL (ref 0–0.04)
IMM GRANULOCYTES NFR BLD AUTO: 4 % (ref 0–0.5)
LYMPHOCYTES # BLD AUTO: 1.7 K/UL (ref 1–4.8)
LYMPHOCYTES NFR BLD: 16.4 % (ref 18–48)
MAGNESIUM SERPL-MCNC: 1.6 MG/DL (ref 1.6–2.6)
MCH RBC QN AUTO: 29.6 PG (ref 27–31)
MCHC RBC AUTO-ENTMCNC: 31.8 G/DL (ref 32–36)
MCV RBC AUTO: 93 FL (ref 82–98)
MONOCYTES # BLD AUTO: 1 K/UL (ref 0.3–1)
MONOCYTES NFR BLD: 9.5 % (ref 4–15)
NEUTROPHILS # BLD AUTO: 7.3 K/UL (ref 1.8–7.7)
NEUTROPHILS NFR BLD: 69.2 % (ref 38–73)
NRBC BLD-RTO: 0 /100 WBC
PHOSPHATE SERPL-MCNC: 4.5 MG/DL (ref 2.7–4.5)
PLATELET # BLD AUTO: 460 K/UL (ref 150–350)
PMV BLD AUTO: 9.3 FL (ref 9.2–12.9)
POTASSIUM SERPL-SCNC: 4.4 MMOL/L (ref 3.5–5.1)
RBC # BLD AUTO: 3.68 M/UL (ref 4.6–6.2)
SODIUM SERPL-SCNC: 135 MMOL/L (ref 136–145)
WBC # BLD AUTO: 10.57 K/UL (ref 3.9–12.7)

## 2020-01-01 PROCEDURE — 84100 ASSAY OF PHOSPHORUS: CPT

## 2020-01-01 PROCEDURE — 63600175 PHARM REV CODE 636 W HCPCS: Performed by: SURGERY

## 2020-01-01 PROCEDURE — 94761 N-INVAS EAR/PLS OXIMETRY MLT: CPT

## 2020-01-01 PROCEDURE — 36415 COLL VENOUS BLD VENIPUNCTURE: CPT

## 2020-01-01 PROCEDURE — 25000003 PHARM REV CODE 250: Performed by: STUDENT IN AN ORGANIZED HEALTH CARE EDUCATION/TRAINING PROGRAM

## 2020-01-01 PROCEDURE — 80048 BASIC METABOLIC PNL TOTAL CA: CPT

## 2020-01-01 PROCEDURE — 99900035 HC TECH TIME PER 15 MIN (STAT)

## 2020-01-01 PROCEDURE — C9113 INJ PANTOPRAZOLE SODIUM, VIA: HCPCS | Performed by: STUDENT IN AN ORGANIZED HEALTH CARE EDUCATION/TRAINING PROGRAM

## 2020-01-01 PROCEDURE — S4991 NICOTINE PATCH NONLEGEND: HCPCS | Performed by: STUDENT IN AN ORGANIZED HEALTH CARE EDUCATION/TRAINING PROGRAM

## 2020-01-01 PROCEDURE — 83735 ASSAY OF MAGNESIUM: CPT

## 2020-01-01 PROCEDURE — 63600175 PHARM REV CODE 636 W HCPCS: Performed by: STUDENT IN AN ORGANIZED HEALTH CARE EDUCATION/TRAINING PROGRAM

## 2020-01-01 PROCEDURE — 25000242 PHARM REV CODE 250 ALT 637 W/ HCPCS: Performed by: SURGERY

## 2020-01-01 PROCEDURE — 25000003 PHARM REV CODE 250: Performed by: SURGERY

## 2020-01-01 PROCEDURE — 94664 DEMO&/EVAL PT USE INHALER: CPT

## 2020-01-01 PROCEDURE — 94640 AIRWAY INHALATION TREATMENT: CPT

## 2020-01-01 PROCEDURE — 85025 COMPLETE CBC W/AUTO DIFF WBC: CPT

## 2020-01-01 PROCEDURE — 25000003 PHARM REV CODE 250: Performed by: PHYSICIAN ASSISTANT

## 2020-01-01 RX ORDER — SCOLOPAMINE TRANSDERMAL SYSTEM 1 MG/1
1 PATCH, EXTENDED RELEASE TRANSDERMAL
Qty: 7 PATCH | Refills: 0 | Status: ON HOLD | OUTPATIENT
Start: 2020-01-03 | End: 2020-01-11 | Stop reason: SDUPTHER

## 2020-01-01 RX ORDER — IBUPROFEN 200 MG
1 TABLET ORAL DAILY
Qty: 14 PATCH | Refills: 0 | Status: SHIPPED | OUTPATIENT
Start: 2020-01-02 | End: 2020-01-16

## 2020-01-01 RX ORDER — LOPERAMIDE HCL 1MG/7.5ML
2 LIQUID (ML) ORAL 3 TIMES DAILY
Qty: 630 ML | Refills: 0 | Status: ON HOLD | OUTPATIENT
Start: 2020-01-01 | End: 2020-01-11 | Stop reason: HOSPADM

## 2020-01-01 RX ORDER — LISINOPRIL 10 MG/1
10 TABLET ORAL DAILY
Qty: 30 TABLET | Refills: 0 | Status: ON HOLD | OUTPATIENT
Start: 2020-01-02 | End: 2020-01-11 | Stop reason: SDUPTHER

## 2020-01-01 RX ORDER — LOPERAMIDE HCL 1MG/7.5ML
2 LIQUID (ML) ORAL 3 TIMES DAILY
Status: DISCONTINUED | OUTPATIENT
Start: 2020-01-01 | End: 2020-01-01 | Stop reason: HOSPADM

## 2020-01-01 RX ORDER — OXYCODONE HYDROCHLORIDE 5 MG/1
5 TABLET ORAL EVERY 4 HOURS PRN
Qty: 35 TABLET | Refills: 0 | Status: SHIPPED | OUTPATIENT
Start: 2020-01-01

## 2020-01-01 RX ORDER — HEPARIN 100 UNIT/ML
100 SYRINGE INTRAVENOUS ONCE
Status: COMPLETED | OUTPATIENT
Start: 2020-01-01 | End: 2020-01-01

## 2020-01-01 RX ADMIN — SIMETHICONE CHEW TAB 80 MG 80 MG: 80 TABLET ORAL at 08:01

## 2020-01-01 RX ADMIN — Medication 1 PATCH: at 08:01

## 2020-01-01 RX ADMIN — CALCIUM CARBONATE (ANTACID) CHEW TAB 500 MG 500 MG: 500 CHEW TAB at 08:01

## 2020-01-01 RX ADMIN — PREDNISONE 10 MG: 5 TABLET ORAL at 06:01

## 2020-01-01 RX ADMIN — HEPARIN SODIUM 5000 UNITS: 5000 INJECTION, SOLUTION INTRAVENOUS; SUBCUTANEOUS at 06:01

## 2020-01-01 RX ADMIN — HEPARIN 100 UNITS: 100 SYRINGE at 11:01

## 2020-01-01 RX ADMIN — Medication 800 MG: at 08:01

## 2020-01-01 RX ADMIN — PANTOPRAZOLE SODIUM 40 MG: 40 INJECTION, POWDER, FOR SOLUTION INTRAVENOUS at 08:01

## 2020-01-01 RX ADMIN — IPRATROPIUM BROMIDE AND ALBUTEROL SULFATE 3 ML: .5; 3 SOLUTION RESPIRATORY (INHALATION) at 08:01

## 2020-01-01 RX ADMIN — ACETAMINOPHEN 650 MG: 325 TABLET ORAL at 06:01

## 2020-01-01 RX ADMIN — Medication 2 MG: at 08:01

## 2020-01-01 RX ADMIN — LISINOPRIL 10 MG: 10 TABLET ORAL at 08:01

## 2020-01-01 NOTE — PROGRESS NOTES
Patient scheduled for discharge today.     Patient states he is comfortable with caring for both ostomies after discharge. Patient states he is familiar with keeping hydrated due to years of chemo.   Assisted patient with changing both pouches. Ileostomy pink and rosebud.   Mild peristomal irritation noted to both sites that appears fungal. Miconazole powder applied to both sites and sealed. Patient states he is familiar with applying the powder and spray under pouch.    Supplies in purple bag, starter kits have been ordered.   Patient scheduled with Nancy GALARZA NP  1/13 for ostomy follow up.    Wound care to follow as needed.   Blanquita Dubois RN [facility]   [phone]

## 2020-01-01 NOTE — PLAN OF CARE
Plan of care reviewed with patient who verbalized understanding. AAOx4. VSS on room air. Tolerating regular diet. No complaints of nausea or pain. Left ileostomy with yellow drainage. Pt is constantly putting out 200-300 mL ever 3-4 hours. Right urostomy with clear, yellow urine. ML incision w/ staples intact with no drainage or foul smell. Pt was given 1.5 liter of MS during shift to replace loss from ileostomy. PRN simethicone was given for gas, but it did not help much. Call light within reach. Bed in the lowest position. Patient mostly slept in between care. No acute events this shift. WCTM.

## 2020-01-01 NOTE — DISCHARGE INSTRUCTIONS
Need to record ileostomy output daily. Need to drink the same amount of fluids that come out of the ostomy. Take 2 mg imodium (2-3 times per day as needed) to thicken the ostomy output. Can titrate it as needed. Follow up with us in 2 weeks for staple removal.     Need to follow up with Urology to remove red rubber catheter and get stent exchanged.

## 2020-01-01 NOTE — DISCHARGE SUMMARY
[facility]  General Surgery  Discharge Summary      Patient Name: Kiko Gruber  MRN: [id number]  Admission Date: 12/22/2019  Hospital Length of Stay: 10 days  Discharge Date and Time:  01/01/2020 9:25 AM  Attending Physician: Ck Herron MD   Discharging Provider: Jean Sood MD  Primary Care Provider: Lala Juarez MD     HPI: 53 y/o M with co-morbidities of HTN and COPD known prostate cancer s/p cystoprostatectomy and bilateral orchiectomy, has urostomy (2015) currently under going chemo for prostate metastases to spine (last chemo approx 1.5 weeks ago, has Lt CW port). Also on prednisone as part of chemo. Getting Neupogen.  Pt states he ate last night around 10p and just after that noted severe abdominal pain and nausea- had multiple episodes of emesis NBNB. Tried his anti-nausea medication but did not help so he went to ED for evaluation. Last BM was yesterday. No fevers at home. Pain has not improved. At OSH had CTabd/pelvis which revealed high grade sbo concerning for internal hernia with closed loop as well as moderate left hydronephrosis and significant perinephric edema with focal narrowing of L ureter as it enters possible internal hernia. WBC at OSH 35.5 and sodium 125 lactate 2.3.    Procedure(s) (LRB):  LAPAROTOMY, EXPLORATORY with  Small bowel resection and Ileostomy creation. (N/A)  URETERONEOCYSTOSTOMY reimplant to conduit (Left)  EXCISION, SMALL INTESTINE     Hospital Course: Underwent ex lap, SBR with ileostomy creation and ureterneocystostomy by urology. He was initially placed on antibiotics that were discontinued once without leukocytosis and source control (olga removed). Wound care was consulted who did education about the ostomy. He was slowly progressed on his diet, he was on TPN while awaiting his prolonged ileus to resolve. Once tolerating a regular diet, his TPN was discharged. PT/OT was consulted post op and he was eventually discharged from  their services as he was ambulating in the halls without any needs. His ileostomy continued to have high output so he was placed on imodium with significant decrease in the output. He wanted to go home and felt that he could replace the losses from ileostomy by mouth. He was discharged home on oral pain meds, imodium, with plans for follow up Dr. Murguia for wound check and removal of sutures, with urology to remove red rubber catheter and to remove stent.     Consults:   Consults (From admission, onward)        Status Ordering Provider     Inpatient consult to General surgery  Once     Provider:  (Not yet assigned)    Completed CHEL HINSON     Inpatient consult to Registered Dietitian/Nutritionist  Once     Provider:  (Not yet assigned)    Completed SANJANA MURGUIA          Significant Diagnostic Studies: Labs:   BMP:   Recent Labs   Lab 12/31/19 0450 12/31/19  0728 01/01/20  0625    98 86   * 130* 135*   K 5.7* 4.6 4.4   CL 98 95 103   CO2 24 26 23   BUN 11 12 12   CREATININE 1.0 1.0 1.0   CALCIUM 9.5 9.7 9.2   MG 1.3*  --  1.6    and CBC   Recent Labs   Lab 12/31/19 0450 01/01/20  0625   WBC 10.65 10.57   HGB 11.5* 10.9*   HCT 35.4* 34.3*   * 460*       Pending Diagnostic Studies:     Procedure Component Value Units Date/Time    Specimen to Pathology, Surgery General Surgery [[id number]] Collected:  12/22/19 1381    Order Status:  Sent Lab Status:  In process Updated:  12/23/19 4228        Final Active Diagnoses:    Diagnosis Date Noted POA    PRINCIPAL PROBLEM:  SBO (small bowel obstruction) [K56.609] 12/23/2019 Yes    Alteration in skin integrity related to surgical incision [R23.9] 12/31/2019 Yes    Nicotine dependence [F17.200] 12/30/2019 Yes    Hypertension [I10] 12/30/2019 Yes    Hypomagnesemia [E83.42] 12/30/2019 No    Hyponatremia [E87.1] 12/30/2019 No    Left ureteral injury [S37.10XA] 12/23/2019 No    Abdominal pain [R10.9] 12/22/2019 Yes      Problems Resolved  During this Admission:      Discharged Condition: good    Disposition: Home or Self Care    Follow Up:  Follow-up Information     ANNETTE Narvaez. Schedule an appointment as soon as possible for a visit in 2 weeks.    Specialties:  Colon and Rectal Surgery, Wound Care  Why:  Wound care nurse, Post-op Appt/New Ileostomy: Office notified. Office to arrange & notify you. (NO HOME HEALTH AGENCY ACCEPTED/Barrier: medicaid insurance.)  [facility] information:  [facility] [id number] [phone]             Ck Herron MD In 2 weeks.    Specialty:  General Surgery  Why:  Post-op Appt:   Contact information:  [address]  [facility] [id number] [phone]             [facility] - U[facility] 4th Floor.    Specialty:  Urology  Why:  follow up urostomy care  Contact information:  [address]  [facility] [address] [phone]  Additional information:  [facility] - 4th Floor               Patient Instructions:      Diet Adult Regular     Lifting restrictions   Order Comments: No lifting greater than 10lbs for 6 weeks post op     Notify your health care provider if you experience any of the following:  increased confusion or weakness     Notify your health care provider if you experience any of the following:  persistent dizziness, light-headedness, or visual disturbances     Notify your health care provider if you experience any of the following:  worsening rash     Notify your health care provider if you experience any of the following:  severe persistent headache     Notify your health care provider if you experience any of the following:  difficulty breathing or increased cough     Notify your health care provider if you experience any of the following:  redness, tenderness, or signs of infection (pain, swelling, redness, odor or green/yellow discharge around incision site)     Notify your health care provider if you experience any of the following:  severe uncontrolled pain      Notify your health care provider if you experience any of the following:  persistent nausea and vomiting or diarrhea     Notify your health care provider if you experience any of the following:  temperature >100.4     Change dressing (specify)   Order Comments: Ostomy care. Measure output daily. Need to drink the amount of fluid coming out of ileostomy. If <1L, then do not have to measure. Take lomotil 2mg three times a day until the output becomes thicker. If it becomes to thick, can decrease the amount needed   Midline incision needs dry gauze changed daily if draining. If not, can leave open to air.     Medications:  Reconciled Home Medications:      Medication List      START taking these medications    lisinopril 10 MG tablet  Take 1 tablet (10 mg total) by mouth once daily.  Start taking on:  January 2, 2020     loperamide 1 mg/7.5 mL solution  Commonly known as:  IMODIUM  Take 15 mLs (2 mg total) by mouth 3 (three) times daily. for 14 days     nicotine 14 mg/24 hr  Commonly known as:  NICODERM CQ  Place 1 patch onto the skin once daily. for 14 days  Start taking on:  January 2, 2020     oxyCODONE 5 MG immediate release tablet  Commonly known as:  ROXICODONE  Take 1 tablet (5 mg total) by mouth every 4 (four) hours as needed.     scopolamine 1.3-1.5 mg (1 mg over 3 days)  Commonly known as:  TRANSDERM-SCOP  Place 1 patch onto the skin Every 3 (three) days.  Start taking on:  January 3, 2020            Jean Sood MD  General Surgery  [facility]

## 2020-01-01 NOTE — ASSESSMENT & PLAN NOTE
55yo M with metastatic prostate cancer s/p radical cystectomy with urostomy on chemo with closed loop obstruction. Now s/p ex-lap 12/22 with resection of segment of necrotic bowel. Iatrogenic L ureter injury now s/p reimplantation    - Regular diet  - no abx   - BE removed 12/27  - Strict I/O, follow ileostomy output   - PRN pain/nausea control PO  - GI ppx, DVT ppx  - PT/OT, ambulate 3x per day  - loperamide three times a day for ileostomy output  - discharge home today

## 2020-01-01 NOTE — ASSESSMENT & PLAN NOTE
Appreciate urology assistance   - continue red rubber catheter in stoma x 3 weeks  - continue single J left ureteral stent x 6 weeks    To follow up with them as outpatient

## 2020-01-01 NOTE — SUBJECTIVE & OBJECTIVE
Interval History: NAEON. Ready to be dischraged home. 2.6L output, output much thicker from ostomy. Walking the halls. Fluids d/dang. Started on imodium yesterday. Tolerating regular diet. No nausea/vomiting.     Medications:  Continuous Infusions:  Scheduled Meds:   acetaminophen  650 mg Oral Q8H    albuterol-ipratropium  3 mL Nebulization Q4H WAKE    heparin (porcine)  5,000 Units Subcutaneous Q8H    lisinopril  10 mg Oral Daily    loperamide  2 mg Oral TID    magnesium oxide  800 mg Oral Daily    nicotine  1 patch Transdermal Daily    pantoprazole  40 mg Intravenous BID    predniSONE  10 mg Oral QAM    scopolamine  1 patch Transdermal Q3 Days     PRN Meds:calcium carbonate, diphenhydrAMINE, miconazole NITRATE 2 %, oxyCODONE, oxyCODONE, promethazine (PHENERGAN) IVPB, simethicone, sodium chloride 0.9%, sodium chloride 0.9%, sodium chloride 0.9%     Review of patient's allergies indicates:  No Known Allergies  Objective:     Vital Signs (Most Recent):  Temp: 97.5 °F (36.4 °C) (01/01/20 0802)  Pulse: 85 (01/01/20 0825)  Resp: 18 (01/01/20 0825)  BP: 127/84 (01/01/20 0802)  SpO2: 98 % (01/01/20 0825) Vital Signs (24h Range):  Temp:  [96.6 °F (35.9 °C)-97.9 °F (36.6 °C)] 97.5 °F (36.4 °C)  Pulse:  [71-98] 85  Resp:  [14-20] 18  SpO2:  [97 %-98 %] 98 %  BP: ()/(52-84) 127/84     Weight: 78.5 kg (172 lb 15.9 oz)  Body mass index is 25.55 kg/m².    Intake/Output - Last 3 Shifts       12/30 0700 - 12/31 0659 12/31 0700 - 01/01 0659 01/01 0700 - 01/02 0659    P.O.       I.V. (mL/kg) 1500 (19.1) 1337.5 (17)     Other 800 850     IV Piggyback  2100     Total Intake(mL/kg) 2300 (29.3) 4287.5 (54.6)     Urine (mL/kg/hr) 2150 (1.1) 1450 (0.8) 350 (1.9)    Emesis/NG output       Stool 4300 2675     Total Output 6450 4125 350    Net -4150 +162.5 -350           Stool Occurrence 1 x            Physical Exam   Constitutional: He appears well-developed and well-nourished. No distress.   HENT:   Head: Normocephalic and  atraumatic.   Cardiovascular: Normal rate and regular rhythm.   Pulmonary/Chest: Effort normal. No respiratory distress.   Abdominal:   Soft, non distended appropriate TTP  Ileostomy site LLQ, green/yellow liquid in ostomy bag, thickened. Midline incision is clean and dry with staples in place.  Existing ureterostomy site RLQ, with red rubber catheter and stent, urine present.     Significant Labs:  CBC:   Recent Labs   Lab 01/01/20  0625   WBC 10.57   RBC 3.68*   HGB 10.9*   HCT 34.3*   *   MCV 93   MCH 29.6   MCHC 31.8*     CMP:   Recent Labs   Lab 12/27/19  0457  01/01/20  0625      < > 86   CALCIUM 9.4   < > 9.2   ALBUMIN 2.6*  --   --    PROT 7.0  --   --    *   < > 135*   K 4.0   < > 4.4   CO2 32*   < > 23   CL 86*   < > 103   BUN 37*   < > 12   CREATININE 1.6*   < > 1.0   ALKPHOS 78  --   --    ALT 9*  --   --    AST 11  --   --    BILITOT 0.4  --   --     < > = values in this interval not displayed.       Significant Diagnostics:  None

## 2020-01-04 ENCOUNTER — NURSE TRIAGE (OUTPATIENT)
Dept: ADMINISTRATIVE | Facility: CLINIC | Age: 55
End: 2020-01-04

## 2020-01-05 ENCOUNTER — NURSE TRIAGE (OUTPATIENT)
Dept: ADMINISTRATIVE | Facility: CLINIC | Age: 55
End: 2020-01-05

## 2020-01-05 NOTE — TELEPHONE ENCOUNTER
Reason for Disposition   [1] Numbness (i.e., loss of sensation) of the face, arm / hand, or leg / foot on one side of the body AND [2] sudden onset AND [3] present now    Additional Information   Negative: [1] SEVERE weakness (i.e., unable to walk or barely able to walk, requires support) AND [2] new onset or worsening   Negative: [1] Weakness (i.e., paralysis, loss of muscle strength) of the face, arm / hand, or leg / foot on one side of the body AND [2] sudden onset AND [3] present now    Protocols used: NEUROLOGIC DEFICIT-A-AH  surg 12/22 laparotomy  pt not sent home with heparin. Hands and feet going numb- mainly on the R. Pt also having fevers.  Pt has bag for urine and one for colon. Putting out more than pt is taking in. rec EMS. Call back with questions

## 2020-01-06 ENCOUNTER — HOSPITAL ENCOUNTER (INPATIENT)
Facility: HOSPITAL | Age: 55
LOS: 5 days | Discharge: HOME | DRG: 988 | End: 2020-01-11
Attending: INTERNAL MEDICINE | Admitting: INTERNAL MEDICINE
Payer: MEDICAID

## 2020-01-06 DIAGNOSIS — N17.9 AKI (ACUTE KIDNEY INJURY): ICD-10-CM

## 2020-01-06 DIAGNOSIS — E87.1 HYPONATREMIA: ICD-10-CM

## 2020-01-06 DIAGNOSIS — I10 HYPERTENSION, UNSPECIFIED TYPE: ICD-10-CM

## 2020-01-06 DIAGNOSIS — E83.42 HYPOMAGNESEMIA: ICD-10-CM

## 2020-01-06 DIAGNOSIS — F17.200 NICOTINE DEPENDENCE, UNCOMPLICATED, UNSPECIFIED NICOTINE PRODUCT TYPE: Primary | ICD-10-CM

## 2020-01-06 PROBLEM — C61 PROSTATE CANCER: Status: ACTIVE | Noted: 2020-01-06

## 2020-01-06 LAB
ALBUMIN SERPL BCP-MCNC: 2.5 G/DL (ref 3.5–5.2)
ALP SERPL-CCNC: 76 U/L (ref 55–135)
ALT SERPL W/O P-5'-P-CCNC: 31 U/L (ref 10–44)
ANION GAP SERPL CALC-SCNC: 10 MMOL/L (ref 8–16)
ANION GAP SERPL CALC-SCNC: 11 MMOL/L (ref 8–16)
ANION GAP SERPL CALC-SCNC: 12 MMOL/L (ref 8–16)
ANION GAP SERPL CALC-SCNC: 12 MMOL/L (ref 8–16)
ANION GAP SERPL CALC-SCNC: 8 MMOL/L (ref 8–16)
ANION GAP SERPL CALC-SCNC: 9 MMOL/L (ref 8–16)
ANION GAP SERPL CALC-SCNC: 9 MMOL/L (ref 8–16)
ANISOCYTOSIS BLD QL SMEAR: SLIGHT
AST SERPL-CCNC: 15 U/L (ref 10–40)
BACTERIA #/AREA URNS AUTO: ABNORMAL /HPF
BASOPHILS NFR BLD: 0 % (ref 0–1.9)
BILIRUB SERPL-MCNC: 0.2 MG/DL (ref 0.1–1)
BILIRUB UR QL STRIP: NEGATIVE
BUN SERPL-MCNC: 40 MG/DL (ref 6–20)
BUN SERPL-MCNC: 43 MG/DL (ref 6–20)
BUN SERPL-MCNC: 47 MG/DL (ref 6–20)
BUN SERPL-MCNC: 47 MG/DL (ref 6–20)
BUN SERPL-MCNC: 50 MG/DL (ref 6–20)
BUN SERPL-MCNC: 50 MG/DL (ref 6–20)
BUN SERPL-MCNC: 52 MG/DL (ref 6–20)
CALCIUM SERPL-MCNC: 7.5 MG/DL (ref 8.7–10.5)
CALCIUM SERPL-MCNC: 7.7 MG/DL (ref 8.7–10.5)
CALCIUM SERPL-MCNC: 7.7 MG/DL (ref 8.7–10.5)
CALCIUM SERPL-MCNC: 8.1 MG/DL (ref 8.7–10.5)
CALCIUM SERPL-MCNC: 8.1 MG/DL (ref 8.7–10.5)
CALCIUM SERPL-MCNC: 8.3 MG/DL (ref 8.7–10.5)
CALCIUM SERPL-MCNC: 8.3 MG/DL (ref 8.7–10.5)
CHLORIDE SERPL-SCNC: 89 MMOL/L (ref 95–110)
CHLORIDE SERPL-SCNC: 91 MMOL/L (ref 95–110)
CHLORIDE SERPL-SCNC: 92 MMOL/L (ref 95–110)
CHLORIDE SERPL-SCNC: 92 MMOL/L (ref 95–110)
CLARITY UR REFRACT.AUTO: CLEAR
CO2 SERPL-SCNC: 16 MMOL/L (ref 23–29)
CO2 SERPL-SCNC: 16 MMOL/L (ref 23–29)
CO2 SERPL-SCNC: 17 MMOL/L (ref 23–29)
CO2 SERPL-SCNC: 17 MMOL/L (ref 23–29)
CO2 SERPL-SCNC: 19 MMOL/L (ref 23–29)
CO2 SERPL-SCNC: 20 MMOL/L (ref 23–29)
CO2 SERPL-SCNC: 20 MMOL/L (ref 23–29)
COLOR UR AUTO: YELLOW
CREAT SERPL-MCNC: 1.8 MG/DL (ref 0.5–1.4)
CREAT SERPL-MCNC: 2 MG/DL (ref 0.5–1.4)
CREAT SERPL-MCNC: 2.4 MG/DL (ref 0.5–1.4)
CREAT SERPL-MCNC: 2.4 MG/DL (ref 0.5–1.4)
CREAT SERPL-MCNC: 3 MG/DL (ref 0.5–1.4)
CREAT SERPL-MCNC: 3 MG/DL (ref 0.5–1.4)
CREAT SERPL-MCNC: 3.2 MG/DL (ref 0.5–1.4)
CREAT UR-MCNC: 44 MG/DL (ref 23–375)
DIFFERENTIAL METHOD: ABNORMAL
EOSINOPHIL NFR BLD: 1 % (ref 0–8)
ERYTHROCYTE [DISTWIDTH] IN BLOOD BY AUTOMATED COUNT: 13.5 % (ref 11.5–14.5)
EST. GFR  (AFRICAN AMERICAN): 23.9 ML/MIN/1.73 M^2
EST. GFR  (AFRICAN AMERICAN): 25.8 ML/MIN/1.73 M^2
EST. GFR  (AFRICAN AMERICAN): 25.8 ML/MIN/1.73 M^2
EST. GFR  (AFRICAN AMERICAN): 33.8 ML/MIN/1.73 M^2
EST. GFR  (AFRICAN AMERICAN): 33.8 ML/MIN/1.73 M^2
EST. GFR  (AFRICAN AMERICAN): 42.2 ML/MIN/1.73 M^2
EST. GFR  (AFRICAN AMERICAN): 47.9 ML/MIN/1.73 M^2
EST. GFR  (NON AFRICAN AMERICAN): 20.7 ML/MIN/1.73 M^2
EST. GFR  (NON AFRICAN AMERICAN): 22.3 ML/MIN/1.73 M^2
EST. GFR  (NON AFRICAN AMERICAN): 22.3 ML/MIN/1.73 M^2
EST. GFR  (NON AFRICAN AMERICAN): 29.3 ML/MIN/1.73 M^2
EST. GFR  (NON AFRICAN AMERICAN): 29.3 ML/MIN/1.73 M^2
EST. GFR  (NON AFRICAN AMERICAN): 36.5 ML/MIN/1.73 M^2
EST. GFR  (NON AFRICAN AMERICAN): 41.4 ML/MIN/1.73 M^2
FINAL PATHOLOGIC DIAGNOSIS: NORMAL
GLUCOSE SERPL-MCNC: 111 MG/DL (ref 70–110)
GLUCOSE SERPL-MCNC: 115 MG/DL (ref 70–110)
GLUCOSE SERPL-MCNC: 140 MG/DL (ref 70–110)
GLUCOSE SERPL-MCNC: 140 MG/DL (ref 70–110)
GLUCOSE SERPL-MCNC: 84 MG/DL (ref 70–110)
GLUCOSE SERPL-MCNC: 98 MG/DL (ref 70–110)
GLUCOSE SERPL-MCNC: 98 MG/DL (ref 70–110)
GLUCOSE UR QL STRIP: NEGATIVE
GROSS: NORMAL
HCT VFR BLD AUTO: 28.5 % (ref 40–54)
HGB BLD-MCNC: 9.6 G/DL (ref 14–18)
HGB UR QL STRIP: ABNORMAL
IMM GRANULOCYTES # BLD AUTO: ABNORMAL K/UL (ref 0–0.04)
IMM GRANULOCYTES NFR BLD AUTO: ABNORMAL % (ref 0–0.5)
KETONES UR QL STRIP: NEGATIVE
LEUKOCYTE ESTERASE UR QL STRIP: ABNORMAL
LYMPHOCYTES NFR BLD: 8 % (ref 18–48)
MAGNESIUM SERPL-MCNC: 1.9 MG/DL (ref 1.6–2.6)
MAGNESIUM SERPL-MCNC: <0.7 MG/DL (ref 1.6–2.6)
MCH RBC QN AUTO: 29.3 PG (ref 27–31)
MCHC RBC AUTO-ENTMCNC: 33.7 G/DL (ref 32–36)
MCV RBC AUTO: 87 FL (ref 82–98)
MICROSCOPIC COMMENT: ABNORMAL
MONOCYTES NFR BLD: 5 % (ref 4–15)
MYELOCYTES NFR BLD MANUAL: 2 %
NEUTROPHILS NFR BLD: 82 % (ref 38–73)
NEUTS BAND NFR BLD MANUAL: 2 %
NITRITE UR QL STRIP: NEGATIVE
NRBC BLD-RTO: 0 /100 WBC
OSMOLALITY SERPL: 264 MOSM/KG (ref 280–300)
OSMOLALITY UR: 160 MOSM/KG (ref 50–1200)
PH UR STRIP: 6 [PH] (ref 5–8)
PHOSPHATE SERPL-MCNC: 5.4 MG/DL (ref 2.7–4.5)
PLATELET # BLD AUTO: 405 K/UL (ref 150–350)
PLATELET BLD QL SMEAR: ABNORMAL
PMV BLD AUTO: 9.2 FL (ref 9.2–12.9)
POTASSIUM SERPL-SCNC: 4 MMOL/L (ref 3.5–5.1)
POTASSIUM SERPL-SCNC: 4 MMOL/L (ref 3.5–5.1)
POTASSIUM SERPL-SCNC: 4.1 MMOL/L (ref 3.5–5.1)
POTASSIUM SERPL-SCNC: 4.1 MMOL/L (ref 3.5–5.1)
POTASSIUM SERPL-SCNC: 4.3 MMOL/L (ref 3.5–5.1)
POTASSIUM SERPL-SCNC: 4.4 MMOL/L (ref 3.5–5.1)
POTASSIUM SERPL-SCNC: 4.8 MMOL/L (ref 3.5–5.1)
PROT SERPL-MCNC: 6.1 G/DL (ref 6–8.4)
PROT UR QL STRIP: NEGATIVE
RBC # BLD AUTO: 3.28 M/UL (ref 4.6–6.2)
RBC #/AREA URNS AUTO: 1 /HPF (ref 0–4)
SODIUM SERPL-SCNC: 117 MMOL/L (ref 136–145)
SODIUM SERPL-SCNC: 117 MMOL/L (ref 136–145)
SODIUM SERPL-SCNC: 118 MMOL/L (ref 136–145)
SODIUM SERPL-SCNC: 118 MMOL/L (ref 136–145)
SODIUM SERPL-SCNC: 119 MMOL/L (ref 136–145)
SODIUM UR-SCNC: <20 MMOL/L (ref 20–250)
SP GR UR STRIP: 1 (ref 1–1.03)
SQUAMOUS #/AREA URNS AUTO: 0 /HPF
URN SPEC COLLECT METH UR: ABNORMAL
UUN UR-MCNC: 223 MG/DL (ref 140–1050)
WBC # BLD AUTO: 8.34 K/UL (ref 3.9–12.7)
WBC #/AREA URNS AUTO: 6 /HPF (ref 0–5)

## 2020-01-06 PROCEDURE — 81001 URINALYSIS AUTO W/SCOPE: CPT

## 2020-01-06 PROCEDURE — 84100 ASSAY OF PHOSPHORUS: CPT

## 2020-01-06 PROCEDURE — 36415 COLL VENOUS BLD VENIPUNCTURE: CPT

## 2020-01-06 PROCEDURE — 80048 BASIC METABOLIC PNL TOTAL CA: CPT | Mod: 91

## 2020-01-06 PROCEDURE — 25000003 PHARM REV CODE 250: Performed by: STUDENT IN AN ORGANIZED HEALTH CARE EDUCATION/TRAINING PROGRAM

## 2020-01-06 PROCEDURE — 83930 ASSAY OF BLOOD OSMOLALITY: CPT

## 2020-01-06 PROCEDURE — 99291 CRITICAL CARE FIRST HOUR: CPT | Mod: ,,, | Performed by: INTERNAL MEDICINE

## 2020-01-06 PROCEDURE — 63600175 PHARM REV CODE 636 W HCPCS: Performed by: STUDENT IN AN ORGANIZED HEALTH CARE EDUCATION/TRAINING PROGRAM

## 2020-01-06 PROCEDURE — 82570 ASSAY OF URINE CREATININE: CPT

## 2020-01-06 PROCEDURE — 80053 COMPREHEN METABOLIC PANEL: CPT

## 2020-01-06 PROCEDURE — 84540 ASSAY OF URINE/UREA-N: CPT

## 2020-01-06 PROCEDURE — 99291 PR CRITICAL CARE, E/M 30-74 MINUTES: ICD-10-PCS | Mod: ,,, | Performed by: INTERNAL MEDICINE

## 2020-01-06 PROCEDURE — 85027 COMPLETE CBC AUTOMATED: CPT

## 2020-01-06 PROCEDURE — 84300 ASSAY OF URINE SODIUM: CPT

## 2020-01-06 PROCEDURE — 20000000 HC ICU ROOM

## 2020-01-06 PROCEDURE — 83935 ASSAY OF URINE OSMOLALITY: CPT

## 2020-01-06 PROCEDURE — 85007 BL SMEAR W/DIFF WBC COUNT: CPT

## 2020-01-06 PROCEDURE — S4991 NICOTINE PATCH NONLEGEND: HCPCS | Performed by: STUDENT IN AN ORGANIZED HEALTH CARE EDUCATION/TRAINING PROGRAM

## 2020-01-06 PROCEDURE — 83735 ASSAY OF MAGNESIUM: CPT | Mod: 91

## 2020-01-06 PROCEDURE — 80048 BASIC METABOLIC PNL TOTAL CA: CPT

## 2020-01-06 PROCEDURE — 83735 ASSAY OF MAGNESIUM: CPT

## 2020-01-06 RX ORDER — SCOLOPAMINE TRANSDERMAL SYSTEM 1 MG/1
1 PATCH, EXTENDED RELEASE TRANSDERMAL
Status: DISCONTINUED | OUTPATIENT
Start: 2020-01-06 | End: 2020-01-11 | Stop reason: HOSPADM

## 2020-01-06 RX ORDER — SODIUM CHLORIDE 0.9 % (FLUSH) 0.9 %
10 SYRINGE (ML) INJECTION
Status: DISCONTINUED | OUTPATIENT
Start: 2020-01-06 | End: 2020-01-11 | Stop reason: HOSPADM

## 2020-01-06 RX ORDER — PREDNISONE 20 MG/1
20 TABLET ORAL ONCE
Status: COMPLETED | OUTPATIENT
Start: 2020-01-06 | End: 2020-01-06

## 2020-01-06 RX ORDER — OXYCODONE HYDROCHLORIDE 5 MG/1
5 TABLET ORAL EVERY 4 HOURS PRN
Status: DISCONTINUED | OUTPATIENT
Start: 2020-01-06 | End: 2020-01-11 | Stop reason: HOSPADM

## 2020-01-06 RX ORDER — MAGNESIUM SULFATE HEPTAHYDRATE 40 MG/ML
2 INJECTION, SOLUTION INTRAVENOUS ONCE
Status: DISCONTINUED | OUTPATIENT
Start: 2020-01-06 | End: 2020-01-06

## 2020-01-06 RX ORDER — PREDNISONE 10 MG/1
10 TABLET ORAL DAILY
Status: DISCONTINUED | OUTPATIENT
Start: 2020-01-07 | End: 2020-01-11 | Stop reason: HOSPADM

## 2020-01-06 RX ORDER — MAGNESIUM SULFATE HEPTAHYDRATE 40 MG/ML
2 INJECTION, SOLUTION INTRAVENOUS
Status: COMPLETED | OUTPATIENT
Start: 2020-01-06 | End: 2020-01-06

## 2020-01-06 RX ORDER — DEXTROSE MONOHYDRATE 50 MG/ML
INJECTION, SOLUTION INTRAVENOUS CONTINUOUS
Status: DISCONTINUED | OUTPATIENT
Start: 2020-01-06 | End: 2020-01-06

## 2020-01-06 RX ORDER — PREDNISONE 20 MG/1
20 TABLET ORAL DAILY
Status: DISCONTINUED | OUTPATIENT
Start: 2020-01-06 | End: 2020-01-06

## 2020-01-06 RX ORDER — LOPERAMIDE HYDROCHLORIDE 2 MG/1
2 CAPSULE ORAL 4 TIMES DAILY
Status: DISCONTINUED | OUTPATIENT
Start: 2020-01-06 | End: 2020-01-07

## 2020-01-06 RX ORDER — MICONAZOLE NITRATE 2 %
POWDER (GRAM) TOPICAL
Status: DISCONTINUED | OUTPATIENT
Start: 2020-01-06 | End: 2020-01-11 | Stop reason: HOSPADM

## 2020-01-06 RX ORDER — DEXTROSE MONOHYDRATE 50 MG/ML
INJECTION, SOLUTION INTRAVENOUS CONTINUOUS
Status: DISCONTINUED | OUTPATIENT
Start: 2020-01-06 | End: 2020-01-07

## 2020-01-06 RX ORDER — LORAZEPAM 2 MG/ML
1 INJECTION INTRAMUSCULAR
Status: DISCONTINUED | OUTPATIENT
Start: 2020-01-06 | End: 2020-01-07

## 2020-01-06 RX ORDER — IBUPROFEN 200 MG
1 TABLET ORAL DAILY
Status: DISCONTINUED | OUTPATIENT
Start: 2020-01-06 | End: 2020-01-11 | Stop reason: HOSPADM

## 2020-01-06 RX ADMIN — MAGNESIUM SULFATE IN WATER 2 G: 40 INJECTION, SOLUTION INTRAVENOUS at 09:01

## 2020-01-06 RX ADMIN — LOPERAMIDE HYDROCHLORIDE 2 MG: 2 CAPSULE ORAL at 06:01

## 2020-01-06 RX ADMIN — MAGNESIUM SULFATE IN WATER 2 G: 40 INJECTION, SOLUTION INTRAVENOUS at 06:01

## 2020-01-06 RX ADMIN — PREDNISONE 20 MG: 20 TABLET ORAL at 11:01

## 2020-01-06 RX ADMIN — Medication 1 PATCH: at 09:01

## 2020-01-06 RX ADMIN — SCOPALAMINE 1 PATCH: 1 PATCH, EXTENDED RELEASE TRANSDERMAL at 09:01

## 2020-01-06 RX ADMIN — LOPERAMIDE HYDROCHLORIDE 2 MG: 2 CAPSULE ORAL at 09:01

## 2020-01-06 RX ADMIN — DEXTROSE: 5 SOLUTION INTRAVENOUS at 09:01

## 2020-01-06 RX ADMIN — DEXTROSE 500 ML: 5 SOLUTION INTRAVENOUS at 09:01

## 2020-01-06 RX ADMIN — DEXTROSE: 5 SOLUTION INTRAVENOUS at 06:01

## 2020-01-06 NOTE — PROGRESS NOTES
Wound care consult for ostomy care     New ileostomy patient recently DC'd from hospital. Admitted for high output/dehydration.    Patient states he is already feeling better. Stoma appears pink and well budded. Smaller now 28mm. Peristomal excoriation treated with powder and spray.     Will await urology to see what plan for red rubber and stent removal. Will plan for urostomy change tomorrow.   Blanquita Dubois RN [facility]   [phone]

## 2020-01-06 NOTE — HPI
This is a 54 YOM with history of metastatic prostate CA with mets to small bowel s/p carmen orchiectomy, and bladder CA s/p cystoprostatectomy with creation of ileal conduit. He was admitted in 12/22/19 for SBO and underwent ex-lap with left ureteral resection requiring ureteral re-implant into the conduit.     He was recently admitted to [facility] due to feelings of dizziness and feeling generally unwell. He reports have concentrated urine output and high ostomy output, despite trying to match his losses with fluid intake. He was found to have an JORGE ALBERTO and was profoundly hyponatremic. He was fluid resuscitated and transferred to [facility] for further management. CT at the OSH showed a left ureteral stent traversing the new anastomosis and minimal left hydronephrosis. Urology was consulted for management of indwelling red rubber catheter and stent.     On evaluation, he is feeling much better. Still minimally hypotensive, but remains afebrile and non-tachycardic.     His Cr at OSH was 4.73 with baseline 1.0. It continues to trend down.   He is having excellent UOP.

## 2020-01-06 NOTE — PLAN OF CARE
(Physician in Lead of Transfers)   Outside Transfer Acceptance Note / [facility]      Transferring Physician: Clarence Lockhart MD    Accepting Physician: Aftab Deleon MD    Date of Acceptance: 01/05/2020    Transferring Facility: [facility]     Reason for Transfer: JORGE ALBERTO and hyponatremia     Report from Transferring Physician/Hospital course:    55 y/o M with co-morbidities of HTN and COPD known prostate cancer s/p cystoprostatectomy and bilateral orchiectomy, has urostomy (2015) currently under going chemo for prostate metastases to spine presents with JORGE ALBERTO and hyponatremia. He was recently discharged on 1/1/ after being admitted for SBO. CT abd/pelvis at that time showed high grade sbo concerning for internal hernia with closed loop as well as moderate left hydronephrosis and significant perinephric edema with focal narrowing of L ureter as it enters possible internal hernia.  He was admitted under general surgery and underwent ex lap, SBR with ileostomy creation and ureterneocystostomy by urology. He was initially placed on antibiotics that were discontinued once without leukocytosis and source control (drain removed). He was then discharged in stable condition. He now presents with fevers, increased output from ileostomy (despite Imodium), decreased urine output, light headed, weakness and hypotension. General surgery consulted and recommended CT scan of abdomen and if negative patient can be admitted under medicine. CT negative for abscess, obstruction, and fluid collection. Does show a Reduction of hydronephrosis of the left kidney, diverticulosis without diverticulitis. Discussed case with ICU and patient will be accepted to ICU.       Labs & Radiographs: see EPIC      To Do List:   1. Admit to ICU  2. Treatment for JORGE ALBERTO and hyponatremia per ICU team  3. Consult general surgery      Aftab Deleon MD  Hospital Medicine Staff

## 2020-01-06 NOTE — TELEPHONE ENCOUNTER
"Pt's sister calling, states pt recently had intestinal sx, chemo, and is possibly jaundiced. Also reports dizziness. Per triage protocol, pt advised to go to ED. 911 precautions given. Caller verbalized understanding.    Reason for Disposition   [1] Drinking very little AND [2] dehydration suspected (e.g., no urine > 12 hours, very dry mouth, very lightheaded)    Additional Information   Negative: Severe difficulty breathing (e.g., struggling for each breath, speaks in single words)   Negative: [1] Difficulty breathing or swallowing AND [2] started suddenly after medicine, an allergic food or bee sting   Negative: Shock suspected (e.g., cold/pale/clammy skin, too weak to stand, low BP, rapid pulse)   Negative: Difficult to awaken or acting confused (e.g., disoriented, slurred speech)   Negative: [1] Weakness (i.e., paralysis, loss of muscle strength) of the face, arm or leg on one side of the body AND [2] sudden onset AND [3] present now   Negative: [1] Numbness (i.e., loss of sensation) of the face, arm or leg on one side of the body AND [2] sudden onset AND [3] present now   Negative: Overdose (accidental or intentional) of medications   Negative: [1] Loss of speech or garbled speech AND [2] sudden onset AND [3] present now   Negative: [1] Fainted > 15 minutes ago AND [2] still feels too weak or dizzy to stand   Negative: Heart beating < 50 beats per minute OR > 140 beats per minute   Negative: Sounds like a life-threatening emergency to the triager   Negative: Difficulty breathing   Negative: SEVERE dizziness (e.g., unable to stand, requires support to walk, feels like passing out now)   Negative: Extra heart beats OR irregular heart beating  (i.e., "palpitations")    Protocols used: DIZZINESS - [id number]      "

## 2020-01-06 NOTE — ASSESSMENT & PLAN NOTE
Patient with metastatic prostate cancer on chemo, received last 12/19 and missed next dose since was in hospital for SBO, underwent ex lap with small bowel resection with ileostomy creation and ureterneocystostomy by urology.   Patient's care is completely in [facility]    - Takes Prednisone 5mg BID

## 2020-01-06 NOTE — CONSULTS
[facility]  Urology  Consult Note    Patient Name: Kiko Gruber  MRN: [id number]  Admission Date: 1/6/2020  Hospital Length of Stay: 0   Code Status: Full Code   Attending Provider: Sadaf Jose MD   Consulting Provider: Nita Cardoza MD  Primary Care Physician: Lala Juarez MD  Principal Problem:<principal problem not specified>    Inpatient consult to Urology  Consult performed by: Nita Cardoza MD  Consult ordered by: Ariela Pierre MD          Subjective:     HPI:  This is a 54 YOM with history of metastatic prostate CA with mets to small bowel s/p carmen orchiectomy, and bladder CA s/p cystoprostatectomy with creation of ileal conduit. He was admitted in 12/22/19 for SBO and underwent ex-lap with left ureteral resection requiring ureteral re-implant into the conduit.     He was recently admitted to [facility] due to feelings of dizziness and feeling generally unwell. He reports have concentrated urine output and high ostomy output, despite trying to match his losses with fluid intake. He was found to have an JORGE ALBERTO and was profoundly hyponatremic. He was fluid resuscitated and transferred to [facility] for further management. CT at the OSH showed a left ureteral stent traversing the new anastomosis and minimal left hydronephrosis. Urology was consulted for management of indwelling red rubber catheter and stent.     On evaluation, he is feeling much better. Still minimally hypotensive, but remains afebrile and non-tachycardic.     His Cr at OSH was 4.73 with baseline 1.0. It continues to trend down.   He is having excellent UOP.         Past Medical History:   Diagnosis Date    Cancer     Prostate    Hypertension        Past Surgical History:   Procedure Laterality Date    ABDOMINAL SURGERY      REIMPLANT URETER IN BLADDER Left 12/22/2019    Procedure: URETERONEOCYSTOSTOMY reimplant to conduit;  Surgeon: Manish Styles MD;  Location: [facility] OR [address] [place] [address];  Service: Urology;   Laterality: Left;       Review of patient's allergies indicates:  No Known Allergies    Family History     None          Tobacco Use    Smoking status: Current Every Day Smoker     Packs/day: 3.00     Years: 18.00     Pack years: 54.00     Types: Cigarettes     Start date: 1991    Smokeless tobacco: Never Used   Substance and Sexual Activity    Alcohol use: Yes     Alcohol/week: 70.0 standard drinks     Types: 70 Cans of beer per week     Frequency: 4 or more times a week     Drinks per session: 10 or more     Binge frequency: Monthly    Drug use: Never    Sexual activity: Yes     Partners: Female     Birth control/protection: None       Review of Systems   Constitutional: Positive for fatigue. Negative for activity change, appetite change and fever.   HENT: Negative for facial swelling and hearing loss.    Eyes: Negative for discharge and itching.   Respiratory: Negative for chest tightness and shortness of breath.    Cardiovascular: Negative for chest pain and leg swelling.   Gastrointestinal: Positive for diarrhea. Negative for abdominal distention, abdominal pain, constipation, nausea and vomiting.   Genitourinary: Negative for difficulty urinating, dysuria, flank pain, hematuria, nocturia and urgency.   Musculoskeletal: Negative for arthralgias, back pain and neck pain.   Skin: Negative for color change and pallor.   Neurological: Positive for dizziness and light-headedness. Negative for facial asymmetry.   Hematological: Negative for adenopathy.   Psychiatric/Behavioral: Negative for agitation and decreased concentration. The patient is not nervous/anxious.        Objective:     Temp:  [97.5 °F (36.4 °C)-98.3 °F (36.8 °C)] 98.3 °F (36.8 °C)  Pulse:  [69-90] 85  Resp:  [13-28] 20  SpO2:  [96 %-100 %] 97 %  BP: ()/(47-69) 92/54     Body mass index is 25.46 kg/m².    Date 01/06/20 0700 - 01/07/20 0659   Shift 1991-9174 1100-7645 2093-8608 24 Hour Total   INTAKE   I.V.(mL/kg) 568.3(7.3)   568.3(7.3)    IV Piggyback 500   500   Shift Total(mL/kg) 1068.3(13.7)   1068.3(13.7)   OUTPUT   Urine(mL/kg/hr) 1300   1300   Stool 400   400   Shift Total(mL/kg) 1700(21.7)   1700(21.7)   Weight (kg) 78.2 78.2 78.2 78.2          Drains     Drain                 Ileostomy 12/22/19 1704 Standard (Ewelina, end) LUQ 14 days         Urostomy 12/22/19 1654 ileal conduit RUQ 14 days                Physical Exam   Constitutional: He is oriented to person, place, and time. He appears well-developed and well-nourished. No distress.   HENT:   Head: Normocephalic and atraumatic.   Eyes: No scleral icterus.   Neck: No tracheal deviation present.   Cardiovascular: Normal rate.    Pulmonary/Chest: Effort normal.   Abdominal: Soft.   Midline incision staples intact, no induration or erythema   Ileostomy left of midline draining light brown sucus  Urostomy right of midline draining clear light yellow urine    Musculoskeletal: Normal range of motion.   Neurological: He is alert and oriented to person, place, and time.   Skin: Skin is warm and dry. He is not diaphoretic.     Psychiatric: He has a normal mood and affect. His behavior is normal.       Significant Labs:    BMP:  Recent Labs   Lab 01/06/20  0513 01/06/20  0753 01/06/20  1147   * 117*  117* 118*  118*   K 4.3 4.1  4.1 4.0  4.0   CL 91* 89*  89* 89*  89*   CO2 17* 16*  16* 20*  20*   BUN 52* 50*  50* 47*  47*   CREATININE 3.2* 3.0*  3.0* 2.4*  2.4*   CALCIUM 7.5* 7.7*  7.7* 8.1*  8.1*       CBC:  Recent Labs   Lab 12/31/19  0450 01/01/20  0625 01/06/20  0513   WBC 10.65 10.57 8.34   HGB 11.5* 10.9* 9.6*   HCT 35.4* 34.3* 28.5*   * 460* 405*       All pertinent labs results from the past 24 hours have been reviewed.    Significant Imaging:  CT: I have reviewed all results within the past 24 hours and my personal findings are:  as below   CT from OSH 1/5 reviewed  Left kidney with mild hydronephrosis, proximal stent coil just distal to the UPJ, conduit  decompressed with red rubber in good position, no urinoma present.      Assessment and Plan:     JORGE ALBERTO (acute kidney injury)  54 YOM with history cystoprostatectomy and b/l orchiectomy 2/2 metastatic prostate cancer and bladder cancer, presenting with JORGE ALBERTO and hyponatremia     -- patient is currently having good urine output and responding well to fluid resuscitation, there is no urologic intervention that is required at this time given stent in adequate position across the anastomosis and urinary catheter in place   -- recommend leaving the red rubber catheter and stent in place and maintaining follow up 1/10 in our clinic, we will reassess if he remains inpatient at that time   -- continue fluid resuscitation per primary  -- continue to trend Cr         VTE Risk Mitigation (From admission, onward)         Ordered     IP VTE HIGH RISK PATIENT  Once      01/06/20 1376                Nita Cardoza MD  Urology  [facility]

## 2020-01-06 NOTE — CONSULTS
[facility]  General Surgery  History & Physical    Patient Name: Kiko Gruber  MRN: [id number]  Admission Date: 1/6/2020  Attending Physician: Sadaf Jose MD   Primary Care Provider: Lala Juarez MD    Patient information was obtained from patient and past medical records.     Subjective:     Chief Complaint/Reason for Admission: Dehydration and hyponatremia    History of Present Illness:  Patient is a 54 y.o. male with a past medical history significant for metastatic prostate cancer on chemo, HTN and ex lap with SBR with end ileostomy and ureteroneocystostomy creation on 12/22/19 who was discharged home on 1/3/20. He reports having high ostomy output at home and drinking >2L of pedialyte and gatorade per day to try to keep up with the output. He states the output would decrease at night, which was also when he would take his dose of immodium. He also describes his urine becoming darker and more concentrated at this time. He states that he began feeling dizzy with tingling and numbness in his fingers 2 days ago and presented to an outside ED where he was found to have an JORGE ALBERTO and was profoundly hyponatremic. He was fluid resuscitated and transferred to [facility] for further evaluation. He currently is sitting in a chair and eating lunch. He states he feels much better already. CT at outside hospital showed minimal to moderate hydronephrosis and resolution of pernephric edema as well as colon diverticulosis.     No current facility-administered medications on file prior to encounter.      Current Outpatient Medications on File Prior to Encounter   Medication Sig    lisinopril 10 MG tablet Take 1 tablet (10 mg total) by mouth once daily.    loperamide (IMODIUM) 1 mg/7.5 mL solution Take 15 mLs (2 mg total) by mouth 3 (three) times daily. for 14 days    nicotine (NICODERM CQ) 14 mg/24 hr Place 1 patch onto the skin once daily. for 14 days    oxyCODONE (ROXICODONE) 5 MG immediate release  tablet Take 1 tablet (5 mg total) by mouth every 4 (four) hours as needed.    scopolamine (TRANSDERM-SCOP) 1.3-1.5 mg (1 mg over 3 days) Place 1 patch onto the skin Every 3 (three) days.       Review of patient's allergies indicates:  No Known Allergies    Past Medical History:   Diagnosis Date    Cancer     Prostate    Hypertension      Past Surgical History:   Procedure Laterality Date    ABDOMINAL SURGERY      REIMPLANT URETER IN BLADDER Left 12/22/2019    Procedure: URETERONEOCYSTOSTOMY reimplant to conduit;  Surgeon: Manish Styles MD;  Location: [facility] OR [address] [place] [address];  Service: Urology;  Laterality: Left;     Family History     None        Tobacco Use    Smoking status: Current Every Day Smoker     Packs/day: 3.00     Years: 18.00     Pack years: 54.00     Types: Cigarettes     Start date: 1991    Smokeless tobacco: Never Used   Substance and Sexual Activity    Alcohol use: Yes     Alcohol/week: 70.0 standard drinks     Types: 70 Cans of beer per week     Frequency: 4 or more times a week     Drinks per session: 10 or more     Binge frequency: Monthly    Drug use: Never    Sexual activity: Yes     Partners: Female     Birth control/protection: None     Review of Systems   Constitutional: Negative for activity change, appetite change, fatigue and fever.   Respiratory: Negative for cough and shortness of breath.    Cardiovascular: Negative for chest pain and palpitations.   Gastrointestinal: Positive for diarrhea (liquid ostomy output). Negative for abdominal pain, nausea and vomiting.   Endocrine: Negative for cold intolerance and heat intolerance.   Musculoskeletal: Negative for arthralgias and myalgias.     Objective:     Vital Signs (Most Recent):  Temp: 98.2 °F (36.8 °C) (01/06/20 0700)  Pulse: 84 (01/06/20 1100)  Resp: 18 (01/06/20 1100)  BP: (!) 92/53 (01/06/20 1100)  SpO2: 97 % (01/06/20 1100) Vital Signs (24h Range):  Temp:  [97.5 °F (36.4 °C)-98.2 °F (36.8 °C)] 98.2 °F (36.8 °C)  Pulse:   [69-90] 84  Resp:  [13-28] 18  SpO2:  [96 %-100 %] 97 %  BP: ()/(47-69) 92/53     Weight: 78.2 kg (172 lb 6.4 oz)  Body mass index is 25.46 kg/m².    Physical Exam   Constitutional: He is oriented to person, place, and time. He appears well-developed and well-nourished. No distress.   Cardiovascular: Normal rate and regular rhythm.   Pulmonary/Chest: Effort normal. No respiratory distress.   Abdominal: Soft. He exhibits no distension. There is no tenderness. There is no rebound and no guarding.   Very thin ostomy output. Ileal conduit in place with normal urine output.  Midline incision is clean, dry and intact   Neurological: He is alert and oriented to person, place, and time.   Skin: Skin is warm and dry.       Significant Labs:  CBC:   Recent Labs   Lab 01/06/20  0513   WBC 8.34   RBC 3.28*   HGB 9.6*   HCT 28.5*   *   MCV 87   MCH 29.3   MCHC 33.7     CMP:   Recent Labs   Lab 01/06/20  0513 01/06/20  0753   GLU 84 140*  140*   CALCIUM 7.5* 7.7*  7.7*   ALBUMIN 2.5*  --    PROT 6.1  --    * 117*  117*   K 4.3 4.1  4.1   CO2 17* 16*  16*   CL 91* 89*  89*   BUN 52* 50*  50*   CREATININE 3.2* 3.0*  3.0*   ALKPHOS 76  --    ALT 31  --    AST 15  --    BILITOT 0.2  --        Significant Diagnostics:      Assessment/Plan:     Active Diagnoses:    Diagnosis Date Noted POA    JORGE ALBERTO (acute kidney injury) [N17.9] 01/06/2020 Unknown    Prostate cancer [C61] 01/06/2020 Unknown    Hyponatremia [E87.1] 12/30/2019 Yes    Nicotine dependence [F17.200] 12/30/2019 Yes    Hypomagnesemia [E83.42] 12/30/2019 Yes    HTN (hypertension) [I10] 12/30/2019 Unknown      Problems Resolved During this Admission:     VTE Risk Mitigation (From admission, onward)         Ordered     IP VTE HIGH RISK PATIENT  Once      01/06/20 0508              55 yo male s/p ex lap with small bowel resection and end ileostomy placement with ureterneocystostomy by urology, discharged home on 1/3/20 who presented to the ED with  JORGE ALBERTO and profound hyponatremia, likely due to his inability to keep up with his high ostomy output.    Plan:  - Please start immodium. May titrate up to decrease ostomy output. Goal is to have </= 1L ostomy output/day. This should help with his electrolyte disturbances.   - Rest of care per MICU. Thank you for the consult. We will follow the patient      Kaitlyn Singletary MD  General Surgery  [facility]

## 2020-01-06 NOTE — ASSESSMENT & PLAN NOTE
At home takes Lisinopril HCTZ 10/12.5    - hold in setting of lower BP, resume when appropriate  - Monitor BP

## 2020-01-06 NOTE — SUBJECTIVE & OBJECTIVE
Past Medical History:   Diagnosis Date    Cancer     Prostate    Hypertension        Past Surgical History:   Procedure Laterality Date    ABDOMINAL SURGERY      REIMPLANT URETER IN BLADDER Left 12/22/2019    Procedure: URETERONEOCYSTOSTOMY reimplant to conduit;  Surgeon: Manish Styles MD;  Location: [facility] OR [address] [place] [address];  Service: Urology;  Laterality: Left;       Review of patient's allergies indicates:  No Known Allergies    Family History     None        Tobacco Use    Smoking status: Current Every Day Smoker     Packs/day: 3.00     Years: 18.00     Pack years: 54.00     Types: Cigarettes     Start date: 1991    Smokeless tobacco: Never Used   Substance and Sexual Activity    Alcohol use: Yes     Alcohol/week: 70.0 standard drinks     Types: 70 Cans of beer per week     Frequency: 4 or more times a week     Drinks per session: 10 or more     Binge frequency: Monthly    Drug use: Never    Sexual activity: Yes     Partners: Female     Birth control/protection: None      Review of Systems   Constitutional: Positive for fatigue. Negative for appetite change and fever.   HENT: Negative for rhinorrhea and sore throat.    Respiratory: Negative for cough and shortness of breath.    Cardiovascular: Negative for chest pain, palpitations and leg swelling.   Gastrointestinal: Negative for abdominal distention, abdominal pain, nausea and vomiting.        Colostomy bag   Genitourinary:        Urostomy bag with urine, output has improved   Skin: Positive for wound (Surgical inciision). Negative for rash.   Neurological: Negative for light-headedness (Resolved) and headaches.        Tingling, numbness of fingers and toes resolved   Hematological: Does not bruise/bleed easily.   Psychiatric/Behavioral: Negative for agitation and confusion.     Objective:     Vital Signs (Most Recent):  Temp: 98.1 °F (36.7 °C) (01/06/20 0435)  Pulse: 85 (01/06/20 0500)  Resp: (!) 28 (01/06/20 0500)  BP: (!) 89/62 (01/06/20 0500)  SpO2:  100 % (01/06/20 0500) Vital Signs (24h Range):  Temp:  [97.5 °F (36.4 °C)-98.1 °F (36.7 °C)] 98.1 °F (36.7 °C)  Pulse:  [76-85] 85  Resp:  [13-28] 28  SpO2:  [96 %-100 %] 100 %  BP: (89-97)/(51-65) 89/62   Weight: 78.2 kg (172 lb 6.4 oz)  Body mass index is 25.46 kg/m².      Intake/Output Summary (Last 24 hours) at 1/6/2020 0557  Last data filed at 1/6/2020 0500  Gross per 24 hour   Intake --   Output 950 ml   Net -950 ml       Physical Exam   Constitutional: He is oriented to person, place, and time. He appears well-developed and well-nourished.   HENT:   Head: Normocephalic and atraumatic.   Eyes: Pupils are equal, round, and reactive to light. EOM are normal.   Neck: Normal range of motion. No JVD present. No thyromegaly present.   Cardiovascular: Normal rate, regular rhythm and normal heart sounds.   No murmur heard.  Pulmonary/Chest: Effort normal and breath sounds normal. He has no wheezes. He has no rales.   Abdominal: Soft. Bowel sounds are normal. He exhibits no distension. There is no tenderness.   Staples in midline for surgical incision  Colostomy and Urostomy bag +, surrounding skin with no erythma   Musculoskeletal: He exhibits no edema.   Neurological: He is alert and oriented to person, place, and time. No cranial nerve deficit.   Psychiatric: He has a normal mood and affect. Judgment normal.       Vents:     Lines/Drains/Airways     Central Venous Catheter Line                 Port A Cath Single Lumen -- days          Drain                 Ileostomy 12/22/19 1704 Standard (Ewelina, end) LUQ 14 days         Urostomy 12/22/19 1654 ileal conduit RUQ 14 days          Peripheral Intravenous Line                 Peripheral IV - Single Lumen 01/06/20 0500 20 G Anterior;Right Upper Arm less than 1 day              Significant Labs:    CBC/Anemia Profile:  No results for input(s): WBC, HGB, HCT, PLT, MCV, RDW, IRON, FERRITIN, RETIC, FOLATE, ILCVAQPK31, OCCULTBLOOD in the last 48 hours.     Chemistries:  No  results for input(s): NA, K, CL, CO2, BUN, CREATININE, CALCIUM, ALBUMIN, PROT, BILITOT, ALKPHOS, ALT, AST, GLUCOSE, MG, PHOS in the last 48 hours.    All pertinent labs within the past 24 hours have been reviewed.    Significant Imaging: I have reviewed and interpreted all pertinent imaging results/findings within the past 24 hours.

## 2020-01-06 NOTE — PLAN OF CARE
CM met with patient at the bedside to discuss D/C POC needs. Patient AAO x's 3 and able to verify demographics in the chart are correct. CM name and contact number listed on the patient's white board.  CM provided explanation of discharge plan process. CM left blue folder at the bedside with explanation of qualification for placement and facility resources. Patient expressed understanding. CM remains available for any further patient needs or concerns.     Lala Juarez MD  [address] [facility] [address] / [place] [address]      [id number] - [place], [place] - [address] AT [facility] [address]  [address]  [facility] [address]  Phone: [phone] Fax: [phone]      Extended Emergency Contact Information  Primary Emergency Contact: feliberto wolfe  Mobile Phone: [phone]  Relation: Sister  Secondary Emergency Contact: malikjazzy  Mobile Phone: [phone]  Relation:     Future Appointments   Date Time Provider Department Center   1/10/2020  1:40 PM [facility] UROLOGY [facility]IC [facility] UROLOGY [facility]   1/13/2020  9:30 AM ANNETTE Narvaez [facility] ENTERO [facility]   1/13/2020  2:15 PM Ck Herron MD [facility] GENSUR [facility]          01/06/20 1217   Discharge Assessment   Assessment Type Discharge Planning Assessment   Confirmed/corrected address and phone number on facesheet? Yes   Assessment information obtained from? Patient   Prior to hospitilization cognitive status: Alert/Oriented   Prior to hospitalization functional status: Independent   Current cognitive status: Alert/Oriented   Current Functional Status: Independent   Lives With alone  (but states that he will be staying with sister post discharge)   Able to Return to Prior Arrangements other (see comments)  (TBD)   Is patient able to care for self after discharge? Unable to determine at this time (comments)   Who are your caregiver(s) and their phone number(s)? sister Carmona, [phone]    Patient currently being followed by outpatient case management? No   Patient currently receives any other outside agency services? No   Equipment Currently Used at Home none   Do you have any problems affording any of your prescribed medications? No   Is the patient taking medications as prescribed? yes   Does the patient have transportation home? Yes   Transportation Anticipated family or friend will provide   Does the patient receive services at the Coumadin Clinic? No   Discharge Plan A Home with family   Discharge Plan B Home   DME Needed Upon Discharge  other (see comments)  (TBD)   Patient/Family in Agreement with Plan yes     Ricco Talley RN MSN  Critical Care-   Ext. [phone]

## 2020-01-06 NOTE — ASSESSMENT & PLAN NOTE
54 YOM with history cystoprostatectomy and b/l orchiectomy 2/2 metastatic prostate cancer and bladder cancer, presenting with JORGE ALBERTO and hyponatremia     -- patient is currently having good urine output and responding well to fluid resuscitation, there is no urologic intervention that is required at this time given stent in adequate position across the anastomosis and urinary catheter in place   -- recommend leaving the red rubber catheter and stent in place and maintaining follow up 1/10 in our clinic, we will reassess if he remains inpatient at that time   -- continue fluid resuscitation per primary  -- continue to trend Cr

## 2020-01-06 NOTE — HPI
Kiko Gruber is a 54 y.o. with metastatic prostate cancer on chemo , HTN who was transferred from the [facility] after presenting for Dizziness.    Patient started having lightheadedness and tingling/numbness of his fingers, fatigue and toes for 2 days went to the OSH ED, He also noticed his UOP was reduced in his urostomy bag. OSH CTAP showed Minimal to moderate hydronephrosis  (left) following ureteral stent and resolution of perinephric edema, reduced from before (12/22/19), Colon diverticulosis. He was initially hypotensive and received 3L of IVF following which his BP started to improve and his MAPS have remained >65. OSH labs notable for Na 111, BUN/Cr 82/4.75. Patient is a smoker 2-3 ppd x 28 years, since last 1 week reduced to 1 ppd     Off note he was discharged from [facility] after he was found to have SBO, underwent ex lap with small bowel resection with ileostomy creation and ureterneocystostomy by urology. So patient was transferred here to have surgical services available

## 2020-01-06 NOTE — SUBJECTIVE & OBJECTIVE
Past Medical History:   Diagnosis Date    Cancer     Prostate    Hypertension        Past Surgical History:   Procedure Laterality Date    ABDOMINAL SURGERY      REIMPLANT URETER IN BLADDER Left 12/22/2019    Procedure: URETERONEOCYSTOSTOMY reimplant to conduit;  Surgeon: Manish Styles MD;  Location: [facility] OR [address] [place] [address];  Service: Urology;  Laterality: Left;       Review of patient's allergies indicates:  No Known Allergies    Family History     None          Tobacco Use    Smoking status: Current Every Day Smoker     Packs/day: 3.00     Years: 18.00     Pack years: 54.00     Types: Cigarettes     Start date: 1991    Smokeless tobacco: Never Used   Substance and Sexual Activity    Alcohol use: Yes     Alcohol/week: 70.0 standard drinks     Types: 70 Cans of beer per week     Frequency: 4 or more times a week     Drinks per session: 10 or more     Binge frequency: Monthly    Drug use: Never    Sexual activity: Yes     Partners: Female     Birth control/protection: None       Review of Systems   Constitutional: Positive for fatigue. Negative for activity change, appetite change and fever.   HENT: Negative for facial swelling and hearing loss.    Eyes: Negative for discharge and itching.   Respiratory: Negative for chest tightness and shortness of breath.    Cardiovascular: Negative for chest pain and leg swelling.   Gastrointestinal: Positive for diarrhea. Negative for abdominal distention, abdominal pain, constipation, nausea and vomiting.   Genitourinary: Negative for difficulty urinating, dysuria, flank pain, hematuria, nocturia and urgency.   Musculoskeletal: Negative for arthralgias, back pain and neck pain.   Skin: Negative for color change and pallor.   Neurological: Positive for dizziness and light-headedness. Negative for facial asymmetry.   Hematological: Negative for adenopathy.   Psychiatric/Behavioral: Negative for agitation and decreased concentration. The patient is not nervous/anxious.         Objective:     Temp:  [97.5 °F (36.4 °C)-98.3 °F (36.8 °C)] 98.3 °F (36.8 °C)  Pulse:  [69-90] 85  Resp:  [13-28] 20  SpO2:  [96 %-100 %] 97 %  BP: ()/(47-69) 92/54     Body mass index is 25.46 kg/m².    Date 01/06/20 0700 - 01/07/20 0659   Shift 1440-9780 9971-2967 3172-4496 24 Hour Total   INTAKE   I.V.(mL/kg) 568.3(7.3)   568.3(7.3)   IV Piggyback 500   500   Shift Total(mL/kg) 1068.3(13.7)   1068.3(13.7)   OUTPUT   Urine(mL/kg/hr) 1300   1300   Stool 400   400   Shift Total(mL/kg) 1700(21.7)   1700(21.7)   Weight (kg) 78.2 78.2 78.2 78.2          Drains     Drain                 Ileostomy 12/22/19 1704 Standard (Ewelina, end) LUQ 14 days         Urostomy 12/22/19 1654 ileal conduit RUQ 14 days                Physical Exam   Constitutional: He is oriented to person, place, and time. He appears well-developed and well-nourished. No distress.   HENT:   Head: Normocephalic and atraumatic.   Eyes: No scleral icterus.   Neck: No tracheal deviation present.   Cardiovascular: Normal rate.    Pulmonary/Chest: Effort normal.   Abdominal: Soft.   Midline incision staples intact, no induration or erythema   Ileostomy left of midline draining light brown sucus  Urostomy right of midline draining clear light yellow urine    Musculoskeletal: Normal range of motion.   Neurological: He is alert and oriented to person, place, and time.   Skin: Skin is warm and dry. He is not diaphoretic.     Psychiatric: He has a normal mood and affect. His behavior is normal.       Significant Labs:    BMP:  Recent Labs   Lab 01/06/20  0513 01/06/20  0753 01/06/20  1147   * 117*  117* 118*  118*   K 4.3 4.1  4.1 4.0  4.0   CL 91* 89*  89* 89*  89*   CO2 17* 16*  16* 20*  20*   BUN 52* 50*  50* 47*  47*   CREATININE 3.2* 3.0*  3.0* 2.4*  2.4*   CALCIUM 7.5* 7.7*  7.7* 8.1*  8.1*       CBC:  Recent Labs   Lab 12/31/19  0450 01/01/20  0625 01/06/20  0513   WBC 10.65 10.57 8.34   HGB 11.5* 10.9* 9.6*   HCT 35.4* 34.3*  28.5*   * 460* 405*       All pertinent labs results from the past 24 hours have been reviewed.    Significant Imaging:  CT: I have reviewed all results within the past 24 hours and my personal findings are:  as below   CT from OSH 1/5 reviewed  Left kidney with mild hydronephrosis, proximal stent coil just distal to the UPJ, conduit decompressed with red rubber in good position, no urinoma present.

## 2020-01-06 NOTE — ASSESSMENT & PLAN NOTE
Patient BUN/ Cr is - 82/4.73  from a baseline Cr of 1    Likely from prerenal in setting of dehydration from poor po intake, patient with previous hydronpehrosis  Received 3L of IVF now BUN/Cr is 52/3.2    - Renal USG to evaluate for hydronephrosis as OSH CTAP showed some left hydronephrosis  -Monitor RFT  -Strict I/O  -Avoid Nephrotoxic drugs  -Renally dose Medications

## 2020-01-06 NOTE — ASSESSMENT & PLAN NOTE
Patient presented to OSH with Na of 111, found to be hypovolemic received 3L of IVF. Currently asymptomatic   1/120 Na was 135    - Sodium in OMC went up 119  - Started on D5 drip in setting of overcorrection  - BMP Q2hr, adjust accodingly not to overcorrect (Goal 117 in 24 hours)

## 2020-01-06 NOTE — H&P
[facility]  Critical Care Medicine  History & Physical    Patient Name: Kiko Gruber  MRN: [id number]  Admission Date: 1/6/2020  Hospital Length of Stay: 0 days  Code Status: Full Code  Attending Physician: Sadaf Jose MD   Primary Care Provider: Lala Juarez MD   Principal Problem: <principal problem not specified>    Subjective:     HPI:  Kiko Gruber is a 54 y.o. with metastatic prostate cancer on chemo , HTN who was transferred from the [facility] after presenting for Dizziness.    Patient started having lightheadedness and tingling/numbness of his fingers, fatigue and toes for 2 days went to the OSH ED, He also noticed his UOP was reduced in his urostomy bag. OSH CTAP showed Minimal to moderate hydronephrosis  (left) following ureteral stent and resolution of perinephric edema, reduced from before (12/22/19), Colon diverticulosis. He was initially hypotensive and received 3L of IVF following which his BP started to improve and his MAPS have remained >65. OSH labs notable for Na 111, BUN/Cr 82/4.75. Patient is a smoker 2-3 ppd x 28 years, since last 1 week reduced to 1 ppd     Off note he was discharged from [facility] after he was found to have SBO, underwent ex lap with small bowel resection with ileostomy creation and ureterneocystostomy by urology. So patient was transferred here to have surgical services available          Hospital/ICU Course:  No notes on file     Past Medical History:   Diagnosis Date    Cancer     Prostate    Hypertension        Past Surgical History:   Procedure Laterality Date    ABDOMINAL SURGERY      REIMPLANT URETER IN BLADDER Left 12/22/2019    Procedure: URETERONEOCYSTOSTOMY reimplant to conduit;  Surgeon: Manish Styles MD;  Location: [facility] OR [address] [place] [address];  Service: Urology;  Laterality: Left;       Review of patient's allergies indicates:  No Known Allergies    Family History     None        Tobacco Use    Smoking status: Current Every Day  Smoker     Packs/day: 3.00     Years: 18.00     Pack years: 54.00     Types: Cigarettes     Start date: 1991    Smokeless tobacco: Never Used   Substance and Sexual Activity    Alcohol use: Yes     Alcohol/week: 70.0 standard drinks     Types: 70 Cans of beer per week     Frequency: 4 or more times a week     Drinks per session: 10 or more     Binge frequency: Monthly    Drug use: Never    Sexual activity: Yes     Partners: Female     Birth control/protection: None      Review of Systems   Constitutional: Positive for fatigue. Negative for appetite change and fever.   HENT: Negative for rhinorrhea and sore throat.    Respiratory: Negative for cough and shortness of breath.    Cardiovascular: Negative for chest pain, palpitations and leg swelling.   Gastrointestinal: Negative for abdominal distention, abdominal pain, nausea and vomiting.        Colostomy bag   Genitourinary:        Urostomy bag with urine, output has improved   Skin: Positive for wound (Surgical inciision). Negative for rash.   Neurological: Negative for light-headedness (Resolved) and headaches.        Tingling, numbness of fingers and toes resolved   Hematological: Does not bruise/bleed easily.   Psychiatric/Behavioral: Negative for agitation and confusion.     Objective:     Vital Signs (Most Recent):  Temp: 98.1 °F (36.7 °C) (01/06/20 0435)  Pulse: 85 (01/06/20 0500)  Resp: (!) 28 (01/06/20 0500)  BP: (!) 89/62 (01/06/20 0500)  SpO2: 100 % (01/06/20 0500) Vital Signs (24h Range):  Temp:  [97.5 °F (36.4 °C)-98.1 °F (36.7 °C)] 98.1 °F (36.7 °C)  Pulse:  [76-85] 85  Resp:  [13-28] 28  SpO2:  [96 %-100 %] 100 %  BP: (89-97)/(51-65) 89/62   Weight: 78.2 kg (172 lb 6.4 oz)  Body mass index is 25.46 kg/m².      Intake/Output Summary (Last 24 hours) at 1/6/2020 0557  Last data filed at 1/6/2020 0500  Gross per 24 hour   Intake --   Output 950 ml   Net -950 ml       Physical Exam   Constitutional: He is oriented to person, place, and time. He appears  well-developed and well-nourished.   HENT:   Head: Normocephalic and atraumatic.   Eyes: Pupils are equal, round, and reactive to light. EOM are normal.   Neck: Normal range of motion. No JVD present. No thyromegaly present.   Cardiovascular: Normal rate, regular rhythm and normal heart sounds.   No murmur heard.  Pulmonary/Chest: Effort normal and breath sounds normal. He has no wheezes. He has no rales.   Abdominal: Soft. Bowel sounds are normal. He exhibits no distension. There is no tenderness.   Staples in midline for surgical incision  Colostomy and Urostomy bag +, surrounding skin with no erythma   Musculoskeletal: He exhibits no edema.   Neurological: He is alert and oriented to person, place, and time. No cranial nerve deficit.   Psychiatric: He has a normal mood and affect. Judgment normal.       Vents:     Lines/Drains/Airways     Central Venous Catheter Line                 Port A Cath Single Lumen -- days          Drain                 Ileostomy 12/22/19 1704 Standard (Ewelina, alvina) LUQ 14 days         Urostomy 12/22/19 1654 ileal conduit RUQ 14 days          Peripheral Intravenous Line                 Peripheral IV - Single Lumen 01/06/20 0500 20 G Anterior;Right Upper Arm less than 1 day              Significant Labs:    CBC/Anemia Profile:  No results for input(s): WBC, HGB, HCT, PLT, MCV, RDW, IRON, FERRITIN, RETIC, FOLATE, NLIQNDCC16, OCCULTBLOOD in the last 48 hours.     Chemistries:  No results for input(s): NA, K, CL, CO2, BUN, CREATININE, CALCIUM, ALBUMIN, PROT, BILITOT, ALKPHOS, ALT, AST, GLUCOSE, MG, PHOS in the last 48 hours.    All pertinent labs within the past 24 hours have been reviewed.    Significant Imaging: I have reviewed and interpreted all pertinent imaging results/findings within the past 24 hours.    Assessment/Plan:     Cardiac/Vascular  HTN (hypertension)  At home takes Lisinopril HCTZ 10/12.5    - hold in setting of lower BP, resume when appropriate  - Monitor  BP          Renal/  JORGE ALBERTO (acute kidney injury)  Patient BUN/ Cr is - 82/4.73  from a baseline Cr of 1    Likely from prerenal in setting of dehydration from poor po intake, patient with previous hydronpehrosis  Received 3L of IVF now BUN/Cr is 52/3.2    - Renal USG to evaluate for hydronephrosis as OSH CTAP showed some left hydronephrosis  -Monitor RFT  -Strict I/O  -Avoid Nephrotoxic drugs  -Renally dose Medications    Hyponatremia  Patient presented to OSH with Na of 111, found to be hypovolemic received 3L of IVF. Currently asymptomatic   1/120 Na was 135    - Sodium in OMC went up 119  - Started on D5 drip in setting of overcorrection  - BMP Q2hr, adjust accodingly not to overcorrect (Goal 117 in 24 hours)    Hypomagnesemia  Mg <0.7      - Replace appropriately and monitor    Oncology  Prostate cancer  Patient with metastatic prostate cancer on chemo, received last 12/19 and missed next dose since was in hospital for SBO, underwent ex lap with small bowel resection with ileostomy creation and ureterneocystostomy by urology.   Patient's care is completely in [facility]    - Takes Prednisone 5mg BID    Other  Nicotine dependence  Patient is a smoker 2-3 ppd x 28 years, since last 1 week reduced to 1 ppd     Nicotine patch       Critical secondary to Patient has a condition that poses threat to life and bodily function: Hyponatremia     Critical care was time spent personally by me on the following activities: development of treatment plan with patient or surrogate and bedside caregivers, discussions with consultants, evaluation of patient's response to treatment, examination of patient, ordering and performing treatments and interventions, ordering and review of laboratory studies, ordering and review of radiographic studies, pulse oximetry, re-evaluation of patient's condition. This critical care time did not overlap with that of any other provider or involve time for any procedures.     Evy  EDWIN Quiroz MD  Critical Care Medicine  [facility]

## 2020-01-07 LAB
ALBUMIN SERPL BCP-MCNC: 2.9 G/DL (ref 3.5–5.2)
ALP SERPL-CCNC: 88 U/L (ref 55–135)
ALT SERPL W/O P-5'-P-CCNC: 33 U/L (ref 10–44)
ANION GAP SERPL CALC-SCNC: 12 MMOL/L (ref 8–16)
ANION GAP SERPL CALC-SCNC: 14 MMOL/L (ref 8–16)
AST SERPL-CCNC: 13 U/L (ref 10–40)
BASOPHILS # BLD AUTO: 0.05 K/UL (ref 0–0.2)
BASOPHILS NFR BLD: 0.6 % (ref 0–1.9)
BILIRUB SERPL-MCNC: 0.2 MG/DL (ref 0.1–1)
BUN SERPL-MCNC: 35 MG/DL (ref 6–20)
BUN SERPL-MCNC: 37 MG/DL (ref 6–20)
BUN SERPL-MCNC: 37 MG/DL (ref 6–20)
BUN SERPL-MCNC: 38 MG/DL (ref 6–20)
CALCIUM SERPL-MCNC: 8.5 MG/DL (ref 8.7–10.5)
CALCIUM SERPL-MCNC: 9.3 MG/DL (ref 8.7–10.5)
CALCIUM SERPL-MCNC: 9.3 MG/DL (ref 8.7–10.5)
CALCIUM SERPL-MCNC: 9.4 MG/DL (ref 8.7–10.5)
CHLORIDE SERPL-SCNC: 92 MMOL/L (ref 95–110)
CHLORIDE SERPL-SCNC: 93 MMOL/L (ref 95–110)
CHLORIDE SERPL-SCNC: 95 MMOL/L (ref 95–110)
CHLORIDE SERPL-SCNC: 95 MMOL/L (ref 95–110)
CO2 SERPL-SCNC: 15 MMOL/L (ref 23–29)
CO2 SERPL-SCNC: 16 MMOL/L (ref 23–29)
CREAT SERPL-MCNC: 1.5 MG/DL (ref 0.5–1.4)
CREAT SERPL-MCNC: 1.5 MG/DL (ref 0.5–1.4)
CREAT SERPL-MCNC: 1.7 MG/DL (ref 0.5–1.4)
CREAT SERPL-MCNC: 1.7 MG/DL (ref 0.5–1.4)
DIFFERENTIAL METHOD: ABNORMAL
EOSINOPHIL # BLD AUTO: 0.1 K/UL (ref 0–0.5)
EOSINOPHIL NFR BLD: 0.8 % (ref 0–8)
ERYTHROCYTE [DISTWIDTH] IN BLOOD BY AUTOMATED COUNT: 13.4 % (ref 11.5–14.5)
EST. GFR  (AFRICAN AMERICAN): 51.3 ML/MIN/1.73 M^2
EST. GFR  (AFRICAN AMERICAN): 51.3 ML/MIN/1.73 M^2
EST. GFR  (AFRICAN AMERICAN): 59.7 ML/MIN/1.73 M^2
EST. GFR  (AFRICAN AMERICAN): 59.7 ML/MIN/1.73 M^2
EST. GFR  (NON AFRICAN AMERICAN): 44.4 ML/MIN/1.73 M^2
EST. GFR  (NON AFRICAN AMERICAN): 44.4 ML/MIN/1.73 M^2
EST. GFR  (NON AFRICAN AMERICAN): 51.7 ML/MIN/1.73 M^2
EST. GFR  (NON AFRICAN AMERICAN): 51.7 ML/MIN/1.73 M^2
GLUCOSE SERPL-MCNC: 110 MG/DL (ref 70–110)
GLUCOSE SERPL-MCNC: 137 MG/DL (ref 70–110)
GLUCOSE SERPL-MCNC: 96 MG/DL (ref 70–110)
GLUCOSE SERPL-MCNC: 96 MG/DL (ref 70–110)
HCT VFR BLD AUTO: 31.9 % (ref 40–54)
HGB BLD-MCNC: 11.1 G/DL (ref 14–18)
IMM GRANULOCYTES # BLD AUTO: 0.4 K/UL (ref 0–0.04)
IMM GRANULOCYTES NFR BLD AUTO: 4.4 % (ref 0–0.5)
LYMPHOCYTES # BLD AUTO: 1.1 K/UL (ref 1–4.8)
LYMPHOCYTES NFR BLD: 12.6 % (ref 18–48)
MAGNESIUM SERPL-MCNC: 1.4 MG/DL (ref 1.6–2.6)
MCH RBC QN AUTO: 29.8 PG (ref 27–31)
MCHC RBC AUTO-ENTMCNC: 34.8 G/DL (ref 32–36)
MCV RBC AUTO: 86 FL (ref 82–98)
MONOCYTES # BLD AUTO: 0.7 K/UL (ref 0.3–1)
MONOCYTES NFR BLD: 8.2 % (ref 4–15)
NEUTROPHILS # BLD AUTO: 6.7 K/UL (ref 1.8–7.7)
NEUTROPHILS NFR BLD: 73.4 % (ref 38–73)
NRBC BLD-RTO: 0 /100 WBC
PHOSPHATE SERPL-MCNC: 3.7 MG/DL (ref 2.7–4.5)
PLATELET # BLD AUTO: 469 K/UL (ref 150–350)
PMV BLD AUTO: 9.3 FL (ref 9.2–12.9)
POTASSIUM SERPL-SCNC: 3.5 MMOL/L (ref 3.5–5.1)
POTASSIUM SERPL-SCNC: 4.2 MMOL/L (ref 3.5–5.1)
POTASSIUM SERPL-SCNC: 4.2 MMOL/L (ref 3.5–5.1)
POTASSIUM SERPL-SCNC: 4.8 MMOL/L (ref 3.5–5.1)
PROT SERPL-MCNC: 7.4 G/DL (ref 6–8.4)
RBC # BLD AUTO: 3.72 M/UL (ref 4.6–6.2)
SODIUM SERPL-SCNC: 120 MMOL/L (ref 136–145)
SODIUM SERPL-SCNC: 122 MMOL/L (ref 136–145)
SODIUM SERPL-SCNC: 123 MMOL/L (ref 136–145)
SODIUM SERPL-SCNC: 123 MMOL/L (ref 136–145)
WBC # BLD AUTO: 9.07 K/UL (ref 3.9–12.7)

## 2020-01-07 PROCEDURE — S4991 NICOTINE PATCH NONLEGEND: HCPCS | Performed by: STUDENT IN AN ORGANIZED HEALTH CARE EDUCATION/TRAINING PROGRAM

## 2020-01-07 PROCEDURE — 97162 PT EVAL MOD COMPLEX 30 MIN: CPT

## 2020-01-07 PROCEDURE — 97165 OT EVAL LOW COMPLEX 30 MIN: CPT

## 2020-01-07 PROCEDURE — 99233 SBSQ HOSP IP/OBS HIGH 50: CPT | Mod: ,,, | Performed by: INTERNAL MEDICINE

## 2020-01-07 PROCEDURE — 25000003 PHARM REV CODE 250: Performed by: STUDENT IN AN ORGANIZED HEALTH CARE EDUCATION/TRAINING PROGRAM

## 2020-01-07 PROCEDURE — 99233 PR SUBSEQUENT HOSPITAL CARE,LEVL III: ICD-10-PCS | Mod: ,,, | Performed by: INTERNAL MEDICINE

## 2020-01-07 PROCEDURE — 51702 INSERT TEMP BLADDER CATH: CPT

## 2020-01-07 PROCEDURE — 51701 INSERT BLADDER CATHETER: CPT

## 2020-01-07 PROCEDURE — 80048 BASIC METABOLIC PNL TOTAL CA: CPT

## 2020-01-07 PROCEDURE — 20000000 HC ICU ROOM

## 2020-01-07 PROCEDURE — 84100 ASSAY OF PHOSPHORUS: CPT

## 2020-01-07 PROCEDURE — 63600175 PHARM REV CODE 636 W HCPCS: Performed by: STUDENT IN AN ORGANIZED HEALTH CARE EDUCATION/TRAINING PROGRAM

## 2020-01-07 PROCEDURE — 83735 ASSAY OF MAGNESIUM: CPT

## 2020-01-07 PROCEDURE — 99233 PR SUBSEQUENT HOSPITAL CARE,LEVL III: ICD-10-PCS | Mod: 24,,, | Performed by: UROLOGY

## 2020-01-07 PROCEDURE — 80053 COMPREHEN METABOLIC PANEL: CPT

## 2020-01-07 PROCEDURE — 80048 BASIC METABOLIC PNL TOTAL CA: CPT | Mod: 91

## 2020-01-07 PROCEDURE — 99233 SBSQ HOSP IP/OBS HIGH 50: CPT | Mod: 24,,, | Performed by: UROLOGY

## 2020-01-07 PROCEDURE — 85025 COMPLETE CBC W/AUTO DIFF WBC: CPT

## 2020-01-07 RX ORDER — POTASSIUM CHLORIDE 20 MEQ/1
40 TABLET, EXTENDED RELEASE ORAL ONCE
Status: COMPLETED | OUTPATIENT
Start: 2020-01-07 | End: 2020-01-07

## 2020-01-07 RX ORDER — DIPHENOXYLATE HYDROCHLORIDE AND ATROPINE SULFATE 2.5; .025 MG/1; MG/1
1 TABLET ORAL 2 TIMES DAILY
Status: DISCONTINUED | OUTPATIENT
Start: 2020-01-07 | End: 2020-01-09

## 2020-01-07 RX ORDER — MAGNESIUM SULFATE HEPTAHYDRATE 40 MG/ML
2 INJECTION, SOLUTION INTRAVENOUS ONCE
Status: COMPLETED | OUTPATIENT
Start: 2020-01-07 | End: 2020-01-07

## 2020-01-07 RX ORDER — ENOXAPARIN SODIUM 100 MG/ML
40 INJECTION SUBCUTANEOUS EVERY 24 HOURS
Status: DISCONTINUED | OUTPATIENT
Start: 2020-01-07 | End: 2020-01-11 | Stop reason: HOSPADM

## 2020-01-07 RX ORDER — CEFAZOLIN SODIUM 1 G/3ML
2 INJECTION, POWDER, FOR SOLUTION INTRAMUSCULAR; INTRAVENOUS ONCE
Status: COMPLETED | OUTPATIENT
Start: 2020-01-07 | End: 2020-01-07

## 2020-01-07 RX ORDER — LOPERAMIDE HYDROCHLORIDE 2 MG/1
4 CAPSULE ORAL 4 TIMES DAILY
Status: DISCONTINUED | OUTPATIENT
Start: 2020-01-07 | End: 2020-01-11 | Stop reason: HOSPADM

## 2020-01-07 RX ORDER — PANTOPRAZOLE SODIUM 40 MG/1
40 TABLET, DELAYED RELEASE ORAL DAILY
Status: DISCONTINUED | OUTPATIENT
Start: 2020-01-07 | End: 2020-01-11 | Stop reason: HOSPADM

## 2020-01-07 RX ADMIN — DIPHENOXYLATE HYDROCHLORIDE AND ATROPINE SULFATE 1 TABLET: 2.5; .025 TABLET ORAL at 03:01

## 2020-01-07 RX ADMIN — DIPHENOXYLATE HYDROCHLORIDE AND ATROPINE SULFATE 1 TABLET: 2.5; .025 TABLET ORAL at 08:01

## 2020-01-07 RX ADMIN — Medication 1 PATCH: at 08:01

## 2020-01-07 RX ADMIN — LOPERAMIDE HYDROCHLORIDE 4 MG: 2 CAPSULE ORAL at 08:01

## 2020-01-07 RX ADMIN — CEFAZOLIN 2 G: 1 INJECTION, POWDER, FOR SOLUTION INTRAMUSCULAR; INTRAVENOUS at 01:01

## 2020-01-07 RX ADMIN — MAGNESIUM SULFATE IN WATER 2 G: 40 INJECTION, SOLUTION INTRAVENOUS at 08:01

## 2020-01-07 RX ADMIN — LOPERAMIDE HYDROCHLORIDE 4 MG: 2 CAPSULE ORAL at 01:01

## 2020-01-07 RX ADMIN — LOPERAMIDE HYDROCHLORIDE 4 MG: 2 CAPSULE ORAL at 05:01

## 2020-01-07 RX ADMIN — POTASSIUM CHLORIDE 40 MEQ: 1500 TABLET, EXTENDED RELEASE ORAL at 06:01

## 2020-01-07 RX ADMIN — PREDNISONE 10 MG: 10 TABLET ORAL at 08:01

## 2020-01-07 RX ADMIN — PANTOPRAZOLE SODIUM 40 MG: 40 TABLET, DELAYED RELEASE ORAL at 11:01

## 2020-01-07 RX ADMIN — ENOXAPARIN SODIUM 40 MG: 100 INJECTION SUBCUTANEOUS at 05:01

## 2020-01-07 NOTE — PROGRESS NOTES
[facility]  General Surgery  Progress Note    Subjective:     History of Present Illness:  No notes on file    Post-Op Info:  * No surgery found *         Interval History: No acute events overnight. Tolerating diet. States he feels well. Still >2L output from ostomy.    Medications:  Continuous Infusions:   dextrose 5 % Stopped (01/06/20 2324)     Scheduled Meds:   loperamide  4 mg Oral QID    magnesium sulfate IVPB  2 g Intravenous Once    nicotine  1 patch Transdermal Daily    predniSONE  10 mg Oral Daily    scopolamine  1 patch Transdermal Q3 Days     PRN Meds:lorazepam, miconazole NITRATE 2 %, oxyCODONE, sodium chloride 0.9%     Review of patient's allergies indicates:  No Known Allergies  Objective:     Vital Signs (Most Recent):  Temp: 97.9 °F (36.6 °C) (01/07/20 0300)  Pulse: 82 (01/07/20 0500)  Resp: 14 (01/07/20 0500)  BP: 95/64 (01/07/20 0500)  SpO2: 100 % (01/07/20 0500) Vital Signs (24h Range):  Temp:  [97.9 °F (36.6 °C)-98.4 °F (36.9 °C)] 97.9 °F (36.6 °C)  Pulse:  [69-97] 82  Resp:  [13-30] 14  SpO2:  [96 %-100 %] 100 %  BP: ()/(51-81) 95/64     Weight: 78.2 kg (172 lb 6.4 oz)  Body mass index is 25.46 kg/m².    Intake/Output - Last 3 Shifts       01/05 0700 - 01/06 0659 01/06 0700 - 01/07 0659 01/07 0700 - 01/08 0659    P.O.  240     I.V. (mL/kg) 50 (0.6) 1708.3 (21.8)     IV Piggyback  500     Total Intake(mL/kg) 50 (0.6) 2448.3 (31.3)     Urine (mL/kg/hr) 550 3950 (2.1)     Stool 400 2950     Total Output 950 6900     Net -900 -4451.7            Stool Occurrence 1 x            Physical Exam   Constitutional: He is oriented to person, place, and time. He appears well-developed and well-nourished. No distress.   Cardiovascular: Normal rate and regular rhythm.   Pulmonary/Chest: Effort normal. No respiratory distress.   Abdominal: Soft. He exhibits no distension and no mass. There is no tenderness. There is no rebound.   Ostomy with liquid output   Neurological: He is  alert and oriented to person, place, and time.   Skin: Skin is warm and dry.       Significant Labs:  CBC:   Recent Labs   Lab 01/07/20  0435   WBC 9.07   RBC 3.72*   HGB 11.1*   HCT 31.9*   *   MCV 86   MCH 29.8   MCHC 34.8     CMP:   Recent Labs   Lab 01/07/20  0435   GLU 96  96   CALCIUM 9.3  9.3   ALBUMIN 2.9*   PROT 7.4   *  123*   K 4.2  4.2   CO2 16*  16*   CL 95  95   BUN 37*  37*   CREATININE 1.5*  1.5*   ALKPHOS 88   ALT 33   AST 13   BILITOT 0.2       Significant Diagnostics:      Assessment/Plan:     Hyponatremia  53 yo male s/p ex lap with end ileostomy for a small bowel obstruction and neoureterocystostomy placement who presented to the ED with hyponatremia and JORGE ALBERTO due to high ostomy output.    Plan:  - Will start lomotil today, given patient's continued high ostomy output  - Rest of care per MICU  - Will continue to follow the patient        Kaitlyn Singletary MD  General Surgery  [facility]

## 2020-01-07 NOTE — ASSESSMENT & PLAN NOTE
Patient presented to OSH with Na of 111, found to be hypovolemic received 3L of IVF. Currently asymptomatic   Sodium in OMC went up 119. Na now improved to 122.     -- D5 drip discontinued.   -- BMP Q4hr

## 2020-01-07 NOTE — ASSESSMENT & PLAN NOTE
55 yo male s/p ex lap with end ileostomy for a small bowel obstruction and neoureterocystostomy placement who presented to the ED with hyponatremia and JORGE ALBERTO due to high ostomy output.    Plan:  - Will start lomotil today, given patient's continued high ostomy output  - Rest of care per MICU  - Will continue to follow the patient

## 2020-01-07 NOTE — SUBJECTIVE & OBJECTIVE
Interval History:  Red rubber still attached via stitch but loose in the ostomy appliance   Cr improving, good UOP  No complaints     Objective:     Temp:  [97.5 °F (36.4 °C)-98.4 °F (36.9 °C)] 97.5 °F (36.4 °C)  Pulse:  [] 101  Resp:  [13-30] 29  SpO2:  [96 %-100 %] 100 %  BP: ()/(51-81) 104/77     Body mass index is 25.46 kg/m².           Drains     Drain                 Ileostomy 12/22/19 1704 Standard (Ewelina, end) LUQ 14 days         Urostomy 12/22/19 1654 ileal conduit RUQ 14 days                Physical Exam   Constitutional: He is oriented to person, place, and time. He appears well-developed and well-nourished. No distress.   HENT:   Head: Normocephalic and atraumatic.   Eyes: No scleral icterus.   Neck: No tracheal deviation present.   Cardiovascular: Normal rate.    Pulmonary/Chest: Effort normal.   Abdominal: Soft.   Midline incision staples intact, no induration or erythema   Ileostomy left of midline draining light brown sucus  Urostomy right of midline draining clear light yellow urine    Musculoskeletal: Normal range of motion.   Neurological: He is alert and oriented to person, place, and time.   Skin: Skin is warm and dry. He is not diaphoretic.     Psychiatric: He has a normal mood and affect. His behavior is normal.       Significant Labs:    BMP:  Recent Labs   Lab 01/06/20 2030 01/07/20  0030 01/07/20  0435   * 120* 123*  123*   K 4.8 4.8 4.2  4.2   CL 92* 92* 95  95   CO2 17* 16* 16*  16*   BUN 40* 38* 37*  37*   CREATININE 1.8* 1.7* 1.5*  1.5*   CALCIUM 8.3* 8.5* 9.3  9.3       CBC:  Recent Labs   Lab 01/01/20  0625 01/06/20  0513 01/07/20  0435   WBC 10.57 8.34 9.07   HGB 10.9* 9.6* 11.1*   HCT 34.3* 28.5* 31.9*   * 405* 469*       All pertinent labs results from the past 24 hours have been reviewed.    Significant Imaging:  CT: I have reviewed all results within the past 24 hours and my personal findings are:  as below   CT from OSH 1/5 reviewed  Left kidney  with mild hydronephrosis, proximal stent coil just distal to the UPJ, conduit decompressed with red rubber in good position, no urinoma present.

## 2020-01-07 NOTE — NURSING
Called and spoke with Johan with [facility] reported to him that pt's is sinus tach on monitor from 120s to 140s. Pt denies any complaints of chest pain or discomfort. Also reported pt's last lab results. Stated that he will place orders.

## 2020-01-07 NOTE — ASSESSMENT & PLAN NOTE
54 YOM with history cystoprostatectomy and b/l orchiectomy 2/2 metastatic prostate cancer and bladder cancer, presenting with JORGE ALBERTO and hyponatremia     -- patient is currently having good urine output and responding well to fluid resuscitation, there is no urologic intervention that is required at this time given stent in adequate position across the anastomosis  --will replace red rubber and rec it stays in place for 1 more week. Will reschedule clinic appointment with us   -- continue fluid resuscitation per primary  -- continue to trend Cr

## 2020-01-07 NOTE — PROVIDER TRANSFER
[facility] Medicine ICU Stepdown Acceptance Note    Date of Admission: 1/6/2020  Date of Transfer / Stepdown: 1/7/2020  [place], C/J, L, Onc (IV chemo w/in 1 month), Gyn/Onc, or other special case?: no   ICU team stepping patient down: [facility]   ICU team member giving verbal handoff:  [id number]  Accepting  team: ORIANA    Brief History of Present Illness:      Kiko Gruber is a 54 y.o. Male with metastatic prostate cancer on chemo, s/p ex lap with small bowel resection and end ileostomy placement with ureterneocystostomy by Urology on 12/22/19 admitted for hyponatremia, hypomagnesemia and JORGE ALBERTO due to high ostomy output. Evaluated by Gen Surg and Urology. On Loperamide and Lomotil. Hyponatremia and JORGE ALBERTO improving.     Hospital/ICU Course:     Sodium over-corrected initially and started in D5 drip for 24 hours. Currently Na is 122. He has been started on Loperamide and Lomotil. Electrolyte derangement likely due to high ostomy output, goal of </= 1L ostomy output/day. Remains in ICU overnight.    Consultants and Procedures:     Consultants:   Consults (From admission, onward)        Status Ordering Provider     Inpatient consult to General Surgery  Once     Provider:  (Not yet assigned)    Completed ALANNAH CHARLES     Inpatient consult to Urology  Once     Provider:  (Not yet assigned)    Completed ALANNAH CHARLES          Procedures:      Transfer Information:     Code status: Full Code    Diet: Diet Adult Regular (IDDSI Level 7) [facility]    Physical Activity: ad norbert    To Do / Pending Studies / Follow ups:    Monitor Na    Patient has been accepted by Hospital Medicine Team IMD, who will assume care of the patient upon arrival to the floor from the ICU. Please contact ICU team with any concerns prior to arrival. Please contact [facility] Medicine at [phone] or [phone] (please do NOT leave a voicemail) when patient arrives to the floor.    Michelle Ray MD  Department of Hospital Medicine  Contact Information 7 AM - 7  PM  Spectralink # [phone]  Pager [phone]

## 2020-01-07 NOTE — PLAN OF CARE
Problem: Occupational Therapy Goal  Goal: Occupational Therapy Goal  Description  Skilled OT services not necessary at this time secondary to patient performing ADLs at [facility]. Patient in agreement. Please re-consult OT if change in patient's functional status is noted.    Outcome: Met Maria [place] OTR/L  Pager #: [phone]  1/7/2020

## 2020-01-07 NOTE — PLAN OF CARE
Problem: Physical Therapy Goal  Goal: Physical Therapy Goal  Outcome: Met     Eval completed. Pt safe with functional mobility.

## 2020-01-07 NOTE — PROGRESS NOTES
Urology Progress Note    - Patient's red rubber catheter in his urostomy stoma had fallen out. I placed a new 16 Fr red rubber catheter per stoma and sutured it in place using 2-0 nylon. In addition, his left ureteral stent migrated distally per recent CT scan at outside hospital. I advanced his stent proximally using a guidewire and sutured it in place using 2-0 nylon. All of this was done using sterile technique. Will give dose of IV cefazolin due to instrumentation.  - STAT KUB ordered to assess stent positioning.  - Will arrange for urology clinic follow-up on 1/17/20 to have red rubber removed.    RENE Granados MD  Urology, PGY-2  Pager: [phone]

## 2020-01-07 NOTE — PLAN OF CARE
CMICU DAILY GOALS       A: Awake    RASS: Goal -    Actual -     Restraint necessity:    B: Breath   SBT: NA   C: Coordinate A & B, analgesics/sedatives   Pain: managed    SAT: NA  D: Delirium   CAM-ICU: Overall CAM-ICU: Negative  E: Early Mobility   MOVE Screen: Pass   Activity: Activity Management: activity adjusted per tolerance  FAS: Feeding/Nutrition   Diet order: Diet/Nutrition Received: regular,   Fluid restriction:    T: Thrombus   DVT prophylaxis: VTE Required Core Measure: (SCDs) Sequential compression device initiated/maintained  H: HOB Elevation   Head of Bed (HOB): HOB at 20-30 degrees  U: Ulcer Prophylaxis   GI: yes  G: Glucose control   managed    S: Skin   Bundle compliance: yes     Date: 1/6/2020  B: Bowel Function   diarrhea   I: Indwelling Catheters   Macias necessity:     CVC necessity: No   IPAD offered: No  D: De-escalation Antibx   No  Plan for the day   Trend BMP  Family/Goals of care/Code Status   Code Status: Full Code     No acute events throughout day, VS and assessment per flow sheet, patient progressing towards goals as tolerated, plan of care reviewed with Kiko Gruber and family, all concerns addressed, will continue to monitor.

## 2020-01-07 NOTE — PROGRESS NOTES
[facility]  Critical Care Medicine  Progress Note    Patient Name: Kiko Gruber  MRN: [id number]  Admission Date: 1/6/2020  Hospital Length of Stay: 1 days  Code Status: Full Code  Attending Provider: Sadaf Jose MD  Primary Care Provider: Lala Juarez MD   Principal Problem: <principal problem not specified>    Subjective:     HPI:  Kiko Gruber is a 54 y.o. with metastatic prostate cancer on chemo , HTN who was transferred from the [facility] after presenting for Dizziness.    Patient started having lightheadedness and tingling/numbness of his fingers, fatigue and toes for 2 days went to the OSH ED, He also noticed his UOP was reduced in his urostomy bag. OSH CTAP showed Minimal to moderate hydronephrosis  (left) following ureteral stent and resolution of perinephric edema, reduced from before (12/22/19), Colon diverticulosis. He was initially hypotensive and received 3L of IVF following which his BP started to improve and his MAPS have remained >65. OSH labs notable for Na 111, BUN/Cr 82/4.75. Patient is a smoker 2-3 ppd x 28 years, since last 1 week reduced to 1 ppd     Off note he was discharged from [facility] after he was found to have SBO, underwent ex lap with small bowel resection with ileostomy creation and ureterneocystostomy by urology. So patient was transferred here to have surgical services available          Hospital/ICU Course:  No notes on file    Interval History/Significant Events:   No acute events over night.   Pt has no complaints. Denies abd pain or n/v. No focal numbness or tingling.     Review of Systems   Constitutional: Negative for appetite change, chills, fatigue and fever.   HENT: Negative for postnasal drip and rhinorrhea.    Respiratory: Negative for cough, chest tightness and shortness of breath.    Cardiovascular: Negative for chest pain and palpitations.   Gastrointestinal: Negative for abdominal pain, nausea and vomiting.   Genitourinary: Negative  for difficulty urinating.   Neurological: Negative for dizziness and weakness.     Objective:     Vital Signs (Most Recent):  Temp: 97.5 °F (36.4 °C) (01/07/20 0700)  Pulse: 95 (01/07/20 1000)  Resp: (!) 24 (01/07/20 1000)  BP: 110/82 (01/07/20 1000)  SpO2: 100 % (01/07/20 1000) Vital Signs (24h Range):  Temp:  [97.5 °F (36.4 °C)-98.4 °F (36.9 °C)] 97.5 °F (36.4 °C)  Pulse:  [] 95  Resp:  [14-30] 24  SpO2:  [96 %-100 %] 100 %  BP: ()/(51-84) 110/82   Weight: 78.2 kg (172 lb 6.4 oz)  Body mass index is 25.46 kg/m².      Intake/Output Summary (Last 24 hours) at 1/7/2020 1031  Last data filed at 1/7/2020 1000  Gross per 24 hour   Intake 1673.33 ml   Output 6525 ml   Net -4851.67 ml       Physical Exam   Constitutional: He is oriented to person, place, and time. He appears well-developed and well-nourished. No distress.   Eyes: Conjunctivae are normal.   Neck: Normal range of motion. Neck supple.   Cardiovascular: Normal rate and regular rhythm.   Pulmonary/Chest: Effort normal and breath sounds normal. No respiratory distress. He has no wheezes.   Abdominal: Soft. Bowel sounds are normal. There is no tenderness.   Midline surgical incision. Ileostomy site in LLQ with green liquid output. Urostomy site in RLQ. No signs of infection.    Musculoskeletal: Normal range of motion.   Neurological: He is alert and oriented to person, place, and time.   Skin: Skin is warm and dry. He is not diaphoretic.   Psychiatric: He has a normal mood and affect.   Nursing note and vitals reviewed.           Lines/Drains/Airways     Central Venous Catheter Line                 Port A Cath Single Lumen -- days          Drain                 Ileostomy 12/22/19 1704 Standard ([place], end) LUQ 15 days         Urostomy 12/22/19 1654 ileal conduit RUQ 15 days          Peripheral Intravenous Line                 Peripheral IV - Single Lumen 01/06/20 0500 20 G Anterior;Right Upper Arm 1 day              Significant Labs:    CBC/Anemia  Profile:  Recent Labs   Lab 01/06/20  0513 01/07/20  0435   WBC 8.34 9.07   HGB 9.6* 11.1*   HCT 28.5* 31.9*   * 469*   MCV 87 86   RDW 13.5 13.4        Chemistries:  Recent Labs   Lab 01/06/20  0513  01/06/20  1147  01/07/20  0030 01/07/20  0435 01/07/20  0815   *   < > 118*  118*   < > 120* 123*  123* 122*   K 4.3   < > 4.0  4.0   < > 4.8 4.2  4.2 3.5   CL 91*   < > 89*  89*   < > 92* 95  95 93*   CO2 17*   < > 20*  20*   < > 16* 16*  16* 15*   BUN 52*   < > 47*  47*   < > 38* 37*  37* 35*   CREATININE 3.2*   < > 2.4*  2.4*   < > 1.7* 1.5*  1.5* 1.7*   CALCIUM 7.5*   < > 8.1*  8.1*   < > 8.5* 9.3  9.3 9.4   ALBUMIN 2.5*  --   --   --   --  2.9*  --    PROT 6.1  --   --   --   --  7.4  --    BILITOT 0.2  --   --   --   --  0.2  --    ALKPHOS 76  --   --   --   --  88  --    ALT 31  --   --   --   --  33  --    AST 15  --   --   --   --  13  --    MG <0.7*  --  1.9  --   --  1.4*  --    PHOS 5.4*  --   --   --   --  3.7  --     < > = values in this interval not displayed.     Urine Studies:   Recent Labs   Lab 01/06/20  0846   COLORU Yellow   APPEARANCEUA Clear   PHUR 6.0   SPECGRAV 1.005   PROTEINUA Negative   GLUCUA Negative   KETONESU Negative   BILIRUBINUA Negative   OCCULTUA 1+*   NITRITE Negative   LEUKOCYTESUR 3+*   RBCUA 1   WBCUA 6*   BACTERIA Few*   SQUAMEPITHEL 0     All pertinent labs within the past 24 hours have been reviewed.    Significant Imaging:  Reviewed       ABG  No results for input(s): PH, PO2, PCO2, HCO3, BE in the last 168 hours.  Assessment/Plan:     Cardiac/Vascular  HTN (hypertension)  Home medications: Lisinopril HCTZ 10/12.5  Home meds held in setting of lower BP, resume when appropriate    -- Monitor BP  -- Initiate home medication when appropriate           Renal/  JORGE ALBERTO (acute kidney injury)  Patient BUN/ Cr is - 82/4.73  from a baseline Cr of 1. BUN/Cr is 52/3.2 following 3L IVF. Likely from prerenal in setting of dehydration from poor po intake. No  hydronephrosis seen on Renal US.       -- Monitor RFT  -- Strict I/O  -- Avoid Nephrotoxic drugs  -- Renally dose Medications    Hyponatremia  Patient presented to OSH with Na of 111, found to be hypovolemic received 3L of IVF. Currently asymptomatic   Sodium in [facility] went up 119. Na now improved to 122.     -- D5 drip discontinued.   -- BMP Q4hr    Hypomagnesemia  Hypomagnesemia <0.7 on admission.     - Replace appropriately and monitor    Oncology  Prostate cancer  Patient with metastatic prostate cancer on chemo, received last 12/19 and missed next dose since was in hospital for SBO, underwent ex lap with small bowel resection with ileostomy creation and ureterneocystostomy by urology.   Patient's care is completely in [facility]. Takes Prednisone 5mg BID.     -- Prednisone 10 mg Daily      Other  Nicotine dependence  Patient is a smoker 2-3 ppd x 28 years, since last 1 week reduced to 1 ppd     -- Nicotine patch       Critical Care Daily Checklist:    A: Awake: RASS Goal/Actual Goal:    Actual: [place] Agitation Sedation Scale (RASS): Alert and calm   B: Spontaneous Breathing Trial Performed?     C: SAT & SBT Coordinated?  yes                      D: Delirium: CAM-ICU Overall CAM-ICU: Negative   E: Early Mobility Performed? Yes   F: Feeding Goal:    Status:     Current Diet Order   Procedures    Diet Adult Regular (IDDSI Level 7) [facility]     Order Specific Question:   Indicate patient location for additional diet options:     Answer:   [facility]      AS: Analgesia/Sedation none   T: Thromboembolic Prophylaxis none   H: HOB > 300 No   U: Stress Ulcer Prophylaxis (if needed) none   G: Glucose Control None    B: Bowel Function Stool Occurrence: 1   I: Indwelling Catheter (Lines & Macias) Necessity Port and PIV   D: De-escalation of Antimicrobials/Pharmacotherapies n/a    Plan for the day/ETD     Code Status:  Family/Goals of Care: Full Code         Critical secondary to Patient has a  condition that poses threat to life and bodily function: hyponatremia      Critical care was time spent personally by me on the following activities: development of treatment plan with patient or surrogate and bedside caregivers, discussions with consultants, evaluation of patient's response to treatment, examination of patient, ordering and performing treatments and interventions, ordering and review of laboratory studies, ordering and review of radiographic studies, pulse oximetry, re-evaluation of patient's condition. This critical care time did not overlap with that of any other provider or involve time for any procedures.     Ariela Pierre MD  Critical Care Medicine  [facility]

## 2020-01-07 NOTE — ASSESSMENT & PLAN NOTE
Patient BUN/ Cr is - 82/4.73  from a baseline Cr of 1. BUN/Cr is 52/3.2 following 3L IVF. Likely from prerenal in setting of dehydration from poor po intake. No hydronephrosis seen on Renal US.       -- Monitor RFT  -- Strict I/O  -- Avoid Nephrotoxic drugs  -- Renally dose Medications

## 2020-01-07 NOTE — ASSESSMENT & PLAN NOTE
Patient with metastatic prostate cancer on chemo, received last 12/19 and missed next dose since was in hospital for SBO, underwent ex lap with small bowel resection with ileostomy creation and ureterneocystostomy by urology.   Patient's care is completely in [facility]. Takes Prednisone 5mg BID.     -- Prednisone 10 mg Daily

## 2020-01-07 NOTE — PLAN OF CARE
CMICU DAILY GOALS       A: Awake    RASS: Goal -    Actual -     Restraint necessity:    B: Breath   SBT: Not intubated   C: Coordinate A & B, analgesics/sedatives   Pain: managed    SAT: Not intubated  D: Delirium   CAM-ICU: Overall CAM-ICU: Negative  E: Early Mobility   MOVE Screen: Pass   Activity: Activity Management: activity adjusted per tolerance  FAS: Feeding/Nutrition   Diet order: Diet/Nutrition Received: regular,   Fluid restriction:    T: Thrombus   DVT prophylaxis: VTE Required Core Measure: Pharmacological prophylaxis initiated/maintained  H: HOB Elevation   Head of Bed (HOB): HOB at 30-45 degrees  U: Ulcer Prophylaxis   GI: yes  G: Glucose control   managed    S: Skin   Bundle compliance: yes     Date: 01/07/20 dayshift  B: Bowel Function   Ileostomy frequent stool    I: Indwelling Catheters   Macias necessity:     CVC necessity: Yes   IPAD offered: Yes  D: De-escalation Antibx   No  Plan for the day   Continue to trend labs, monitor neuro status, and step down.  Family/Goals of care/Code Status   Code Status: Full Code     No acute events throughout day, VS and assessment per flow sheet, patient progressing towards goals as tolerated, plan of care reviewed with Kiko Gruber and family, all concerns addressed, will continue to monitor.

## 2020-01-07 NOTE — PLAN OF CARE
No acute events throughout day. See vital signs and assessments in flowsheets. See below for updates on today's progress.     Cardiovascular: HR sinus rhythm. Systolic -90s, MAPs >65.     Neurological: AAOx4, denies pain    Gastrointestinal: Illeostomy in place to LUQ, draining liquid yellow-brown stool. Pt denies nausea, abd pain. Midline incision to abdomen with staples in place. No redness, scant amount dried drainage.    Genitourinary: Urostomy to RUQ, ~550cc clear yellow urine out.    Endocrine: Trending BMP    Patient progressing towards goals as tolerated, plan of care communicated and reviewed with Kiko Gruber and family. All concerns addressed. Will continue to monitor.     Problem: Adult Inpatient Plan of Care  Goal: Plan of Care Review  Outcome: Ongoing, Progressing     Problem: Adult Inpatient Plan of Care  Goal: Patient-Specific Goal (Individualization)  Outcome: Ongoing, Progressing

## 2020-01-07 NOTE — PLAN OF CARE
Handoff     Primary Team: Networked reference to record PCT  Room Number: 6085/6085 A     Patient Name: Kiko Gruber MRN: [id number]     Date of Birth: [id number] Allergies: Patient has no known allergies.     Age: 54 y.o. Admit Date: 1/6/2020     Sex: male  BMI: Body mass index is 25.46 kg/m².     Code Status: Full Code        Illness Level (current clinical status): Watcher - No    Reason for Admission: Hyponatremia    Brief HPI (pertinent PMH and diagnosis or differential diagnosis): 54 y.o. Male with metastatic prostate cancer on chemo, s/p ex lap with small bowel resection and end ileostomy placement with ureterneocystostomy by urology on 12/22/19 admitted for hyponatremia, hypomagnesemia and JORGE ALBERTO 2/2 high ostomy output. Evaluated bu gen surg and urology. On Loperamide and Lomotil. Hyponatremia and JORGE ALBERTO improving.     Hospital Course (updated, brief assessment by system or problem, significant events):  Sodium overcorrected initially and started in D5 drip for 24 hours. Currently Na is 122. He has been started on Loperamide and Lomotil. Electrolyte derangement likely due to high ostomy output, goal of </= 1L ostomy output/day.     Tasks (specific, using if-then statements): Continue q4hrs BNP to monitor Na.     Contingency Plan (special circumstances anticipated and plan):   Call urology with any issues with Urostomy.     Estimated Discharge Date: 01/09/20    Discharge Disposition: Home or Self Care    Mentored By: Dr. Sadaf Jose

## 2020-01-07 NOTE — PT/OT/SLP EVAL
Physical Therapy Evaluation/Discharge    Patient Name:  Kiko Gruber   MRN:  [id number]    Recommendations:     Discharge Recommendations:  home   Discharge Equipment Recommendations: none   Barriers to discharge: None    Assessment:     Kiko Gruber is a 54 y.o. male admitted with a medical diagnosis of hyponatremia.  He presents with the following impairments/functional limitations:  impaired cardiopulmonary response to activity. PT evaluation complete and no goals established. Pt is functioning at high level and does not required acute care physical therapy services. Education provided to ambulate in hallway 3x/day with RN in order to maintain strength and endurance. Pt verbalized understanding. Please re-consult if functional mobility changes. Anticipate d/c to home; no needs.     Rehab Prognosis: Good     Recent Surgery: * No surgery found *      Plan:     · D/c form acute PT    Subjective     Chief Complaint: being in the hospital   Patient/Family Comments/goals: to get better and return home   Pain/Comfort:  · Pain Rating 1: 0/10  · Pain Rating Post-Intervention 1: 0/10    Patients cultural, spiritual, Spiritism conflicts given the current situation: no    Living Environment:  Pt lives with sister in a [facility] with 3 BERE and B HR  Prior to admission, patients level of function was indep with ambulation and ADLs.  Equipment used at home: none.  DME owned (not currently used): none.  Upon discharge, patient will have assistance from sister.    Objective:     Communicated with RN prior to session.  Patient found HOB elevated with telemetry, pulse ox (continuous), blood pressure cuff, central line, peripheral IV(ileostomy, urostomy )  upon PT entry to room.    General Precautions: Standard, fall   Orthopedic Precautions:N/A   Braces: N/A     Exams:  · Cognitive Exam:    · AAOx4  · Follows multistep commands  · Communication clear/fluent   · RLE ROM: WFL  · RLE Strength: WFL  · LLE ROM: WFL  · LLE Strength:  WFL    Functional Mobility:  · Bed Mobility:     · Scooting to place B feet on floor: independent   · Scooting supine with HOB elevated up in bed: independence  · Supine to Sit: independence  · Sit to Supine: independence  · Transfers:     · Sit to Stand:  independence with no AD from EOB   · Gait: 400ft with independence (outside room)  · No LOB with horizontal head turns, change of speed, or change in direction  · No SOB   · No gait deviations noted       Therapeutic Activities and Exercises:  Educated pt on PT role/POC  Educated pt on importance of OOB activity and daily ambulation   Pt verbalized understanding     AM-PAC 6 CLICK MOBILITY  Total Score:24     Patient left HOB elevated with all lines intact, call button in reach and RN notified.    GOALS:   Multidisciplinary Problems     Physical Therapy Goals     Not on file          Multidisciplinary Problems (Resolved)        Problem: Physical Therapy Goal    Goal Priority Disciplines Outcome Goal Variances Interventions   Physical Therapy Goal   (Resolved)     PT, PT/OT Met                     History:     Past Medical History:   Diagnosis Date    Cancer     Prostate    Hypertension        Past Surgical History:   Procedure Laterality Date    ABDOMINAL SURGERY      REIMPLANT URETER IN BLADDER Left 12/22/2019    Procedure: URETERONEOCYSTOSTOMY reimplant to conduit;  Surgeon: Manish Styles MD;  Location: [facility] OR [address] [place] [address];  Service: Urology;  Laterality: Left;       Time Tracking:     PT Received On: 01/07/20  PT Start Time: 1042     PT Stop Time: 1057  PT Total Time (min): 15 min     Billable Minutes: Evaluation 15      Angela Whitmore PT, DPT  1/7/2020  [phone]

## 2020-01-07 NOTE — PT/OT/SLP EVAL
Occupational Therapy   Evaluation and Discharge Note    Name: Kiko Gruber  MRN: [id number]  Admitting Diagnosis:  <principal problem not specified>      Recommendations:     Discharge Recommendations: home  Discharge Equipment Recommendations:  none  Barriers to discharge:  None    Assessment:     Kiko Gruber is a 54 y.o. male with a medical diagnosis of hyponatremia. Patient admitted 1/6 as transfer from H 2* dizziness. Patient with diagnosis of metastatic prostate cancer and receiving chemotherapy. At this time, patient is functioning at their prior level of function and does not require further acute OT services.     Plan:     During this hospitalization, patient does not require further acute OT services.  Please re-consult if situation changes.    · Plan of Care Reviewed with: patient    Subjective     Chief Complaint: none stated  Patient/Family Comments/goals: to go home    Occupational Profile:  Living Environment: Patient lives alone but currently is living with his sister in [facility] with 3 BERE with B handrails. Patient has a walk in shower.  Previous level of function: Independent with ADLs, IADLs, ambulation using no AD.  Roles and Routines: Patient no longer works. 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  Equipment Used at home: none  Assistance upon Discharge: Sister will be able to assist    Pain/Comfort:  · Pain Rating 1: 0/10    Patients cultural, spiritual, Latter day conflicts given the current situation: no    Objective:     Communicated with: RN prior to session. Patient found HOB elevated with telemetry, peripheral IV, pulse ox (continuous), blood pressure cuff, urostomy, ileostomy upon OT entry to room. PT present for co-evaluation.    General Precautions: Standard,     Orthopedic Precautions:N/A   Braces: N/A     Occupational Performance:    Bed Mobility:    · Patient completed Scooting/Bridging with independence  · Patient completed Supine to Sit with independence  · Patient completed Sit to Supine  with independence    Functional Mobility/Transfers:  · Patient completed Sit <> Stand Transfer with independence  with  no assistive device   · Functional Mobility: Patient ambulated community distance in hallway with supervision/independence.   · Cues provided for pacing/slowing down during gait.    Activities of Daily Living:  · Upper Body Dressing: independence donning gown sitting EOB  · Lower Body Dressing: independence donning shoes sitting EOB    Cognitive/Visual Perceptual:  Cognitive/Psychosocial Skills:     -       Oriented to: Person, Place, Time and Situation   -       Follows Commands/attention:Follows multistep  commands  -       Communication: clear/fluent  -       Memory: No Deficits noted  -       Safety awareness/insight to disability: intact   -       Mood/Affect/Coping skills/emotional control: Appropriate to situation and Cooperative    Physical Exam:  Upper Extremity Range of Motion:     -       Right Upper Extremity: WNL  -       Left Upper Extremity: WNL  Upper Extremity Strength:    -       Right Upper Extremity: WNL  -       Left Upper Extremity: WNL   Strength:    -       Right Upper Extremity: WNL  -       Left Upper Extremity: WNL  Fine Motor Coordination:    -       Intact  Gross motor coordination:   WFL    AMPAC 6 Click ADL:  AMPAC Total Score: 23    Treatment & Education:   Therapist provided facilitation and instruction of proper body mechanics, energy conservation, and fall prevention strategies during tasks listed above.   Instructed patient to sit in bedside chair daily to increase OOB/activity tolerance.   Educated patient on OT POC and answered all questions within OT scope of practice.   Whiteboard updated   Education:    Patient left HOB elevated with all lines intact and call button in reach    GOALS:   Multidisciplinary Problems     Occupational Therapy Goals     Not on file          Multidisciplinary Problems (Resolved)        Problem: Occupational Therapy Goal     Goal Priority Disciplines Outcome Interventions   Occupational Therapy Goal   (Resolved)     OT, PT/OT Met    Description:  Skilled OT services not necessary at this time secondary to patient performing ADLs at OF. Patient in agreement. Please re-consult OT if change in patient's functional status is noted.                     History:     Past Medical History:   Diagnosis Date    Cancer     Prostate    Hypertension        Past Surgical History:   Procedure Laterality Date    ABDOMINAL SURGERY      REIMPLANT URETER IN BLADDER Left 12/22/2019    Procedure: URETERONEOCYSTOSTOMY reimplant to conduit;  Surgeon: Manish Styles MD;  Location: [facility] OR [address] [place] [address];  Service: Urology;  Laterality: Left;       Time Tracking:     OT Date of Treatment: 01/07/20  OT Start Time: 1038  OT Stop Time: 1053  OT Total Time (min): 15 min    Billable Minutes:Evaluation 15    Maria Guadalupe [place] OT  1/7/2020

## 2020-01-07 NOTE — PLAN OF CARE
See vital signs and assessments in flowsheets. See below for updates on today's progress.     Cardiovascular: HR sinus rhyth    Neurological: Afebrile. Denies pain.    Gastrointestinal: Large mostly liquid output from illeostomy, ~1800cc. Appetite good.    Genitourinary: Urostomy draining appropriately, clear yellow urine. ~1550cc urine.    Endocrine: Q4 BMP    Infusions: D5 paused per primary team    Patient progressing towards goals as tolerated, plan of care communicated and reviewed with Kiko Yasmani and family. All concerns addressed. Will continue to monitor.     Problem: Adult Inpatient Plan of Care  Goal: Plan of Care Review  1/7/2020 0716 by Beatriz Galvan RN  Outcome: Ongoing, Progressing  1/6/2020 1924 by Beatriz Galvan RN  Outcome: Ongoing, Progressing     Problem: Adult Inpatient Plan of Care  Goal: Patient-Specific Goal (Individualization)  1/7/2020 0716 by Beatriz Galvan, RN  Outcome: Ongoing, Progressing  1/6/2020 1924 by Beatriz Galvan, RN  Outcome: Ongoing, Progressing

## 2020-01-07 NOTE — PROGRESS NOTES
Urology coming to bedside to replace red rubber catheter that came out last night. Pouch ready with carlos attachment for them to replace urostomy bag after placement.   Ileostomy bag intact.     Will follow PRN.  Blanquita Dubois RN CN [facility]   [phone]

## 2020-01-07 NOTE — SUBJECTIVE & OBJECTIVE
Interval History: No acute events overnight. Tolerating diet. States he feels well. Still >2L output from ostomy.    Medications:  Continuous Infusions:   dextrose 5 % Stopped (01/06/20 2324)     Scheduled Meds:   loperamide  4 mg Oral QID    magnesium sulfate IVPB  2 g Intravenous Once    nicotine  1 patch Transdermal Daily    predniSONE  10 mg Oral Daily    scopolamine  1 patch Transdermal Q3 Days     PRN Meds:lorazepam, miconazole NITRATE 2 %, oxyCODONE, sodium chloride 0.9%     Review of patient's allergies indicates:  No Known Allergies  Objective:     Vital Signs (Most Recent):  Temp: 97.9 °F (36.6 °C) (01/07/20 0300)  Pulse: 82 (01/07/20 0500)  Resp: 14 (01/07/20 0500)  BP: 95/64 (01/07/20 0500)  SpO2: 100 % (01/07/20 0500) Vital Signs (24h Range):  Temp:  [97.9 °F (36.6 °C)-98.4 °F (36.9 °C)] 97.9 °F (36.6 °C)  Pulse:  [69-97] 82  Resp:  [13-30] 14  SpO2:  [96 %-100 %] 100 %  BP: ()/(51-81) 95/64     Weight: 78.2 kg (172 lb 6.4 oz)  Body mass index is 25.46 kg/m².    Intake/Output - Last 3 Shifts       01/05 0700 - 01/06 0659 01/06 0700 - 01/07 0659 01/07 0700 - 01/08 0659    P.O.  240     I.V. (mL/kg) 50 (0.6) 1708.3 (21.8)     IV Piggyback  500     Total Intake(mL/kg) 50 (0.6) 2448.3 (31.3)     Urine (mL/kg/hr) 550 3950 (2.1)     Stool 400 2950     Total Output 950 6900     Net -900 -4451.7            Stool Occurrence 1 x            Physical Exam   Constitutional: He is oriented to person, place, and time. He appears well-developed and well-nourished. No distress.   Cardiovascular: Normal rate and regular rhythm.   Pulmonary/Chest: Effort normal. No respiratory distress.   Abdominal: Soft. He exhibits no distension and no mass. There is no tenderness. There is no rebound.   Ostomy with liquid output   Neurological: He is alert and oriented to person, place, and time.   Skin: Skin is warm and dry.       Significant Labs:  CBC:   Recent Labs   Lab 01/07/20  0435   WBC 9.07   RBC 3.72*   HGB 11.1*    HCT 31.9*   *   MCV 86   MCH 29.8   MCHC 34.8     CMP:   Recent Labs   Lab 01/07/20  0435   GLU 96  96   CALCIUM 9.3  9.3   ALBUMIN 2.9*   PROT 7.4   *  123*   K 4.2  4.2   CO2 16*  16*   CL 95  95   BUN 37*  37*   CREATININE 1.5*  1.5*   ALKPHOS 88   ALT 33   AST 13   BILITOT 0.2       Significant Diagnostics:

## 2020-01-07 NOTE — SUBJECTIVE & OBJECTIVE
Interval History/Significant Events:   No acute events over night.   Pt has no complaints. Denies abd pain or n/v. No focal numbness or tingling.     Review of Systems   Constitutional: Negative for appetite change, chills, fatigue and fever.   HENT: Negative for postnasal drip and rhinorrhea.    Respiratory: Negative for cough, chest tightness and shortness of breath.    Cardiovascular: Negative for chest pain and palpitations.   Gastrointestinal: Negative for abdominal pain, nausea and vomiting.   Genitourinary: Negative for difficulty urinating.   Neurological: Negative for dizziness and weakness.     Objective:     Vital Signs (Most Recent):  Temp: 97.5 °F (36.4 °C) (01/07/20 0700)  Pulse: 95 (01/07/20 1000)  Resp: (!) 24 (01/07/20 1000)  BP: 110/82 (01/07/20 1000)  SpO2: 100 % (01/07/20 1000) Vital Signs (24h Range):  Temp:  [97.5 °F (36.4 °C)-98.4 °F (36.9 °C)] 97.5 °F (36.4 °C)  Pulse:  [] 95  Resp:  [14-30] 24  SpO2:  [96 %-100 %] 100 %  BP: ()/(51-84) 110/82   Weight: 78.2 kg (172 lb 6.4 oz)  Body mass index is 25.46 kg/m².      Intake/Output Summary (Last 24 hours) at 1/7/2020 1031  Last data filed at 1/7/2020 1000  Gross per 24 hour   Intake 1673.33 ml   Output 6525 ml   Net -4851.67 ml       Physical Exam   Constitutional: He is oriented to person, place, and time. He appears well-developed and well-nourished. No distress.   Eyes: Conjunctivae are normal.   Neck: Normal range of motion. Neck supple.   Cardiovascular: Normal rate and regular rhythm.   Pulmonary/Chest: Effort normal and breath sounds normal. No respiratory distress. He has no wheezes.   Abdominal: Soft. Bowel sounds are normal. There is no tenderness.   Midline surgical incision. Ileostomy site in LLQ with green liquid output. Urostomy site in RLQ. No signs of infection.    Musculoskeletal: Normal range of motion.   Neurological: He is alert and oriented to person, place, and time.   Skin: Skin is warm and dry. He is not  diaphoretic.   Psychiatric: He has a normal mood and affect.   Nursing note and vitals reviewed.           Lines/Drains/Airways     Central Venous Catheter Line                 Port A Cath Single Lumen -- days          Drain                 Ileostomy 12/22/19 1704 Standard (alvina Theodore) LUQ 15 days         Urostomy 12/22/19 1654 ileal conduit RUQ 15 days          Peripheral Intravenous Line                 Peripheral IV - Single Lumen 01/06/20 0500 20 G Anterior;Right Upper Arm 1 day              Significant Labs:    CBC/Anemia Profile:  Recent Labs   Lab 01/06/20  0513 01/07/20  0435   WBC 8.34 9.07   HGB 9.6* 11.1*   HCT 28.5* 31.9*   * 469*   MCV 87 86   RDW 13.5 13.4        Chemistries:  Recent Labs   Lab 01/06/20  0513  01/06/20  1147  01/07/20  0030 01/07/20  0435 01/07/20  0815   *   < > 118*  118*   < > 120* 123*  123* 122*   K 4.3   < > 4.0  4.0   < > 4.8 4.2  4.2 3.5   CL 91*   < > 89*  89*   < > 92* 95  95 93*   CO2 17*   < > 20*  20*   < > 16* 16*  16* 15*   BUN 52*   < > 47*  47*   < > 38* 37*  37* 35*   CREATININE 3.2*   < > 2.4*  2.4*   < > 1.7* 1.5*  1.5* 1.7*   CALCIUM 7.5*   < > 8.1*  8.1*   < > 8.5* 9.3  9.3 9.4   ALBUMIN 2.5*  --   --   --   --  2.9*  --    PROT 6.1  --   --   --   --  7.4  --    BILITOT 0.2  --   --   --   --  0.2  --    ALKPHOS 76  --   --   --   --  88  --    ALT 31  --   --   --   --  33  --    AST 15  --   --   --   --  13  --    MG <0.7*  --  1.9  --   --  1.4*  --    PHOS 5.4*  --   --   --   --  3.7  --     < > = values in this interval not displayed.     Urine Studies:   Recent Labs   Lab 01/06/20  0846   COLORU Yellow   APPEARANCEUA Clear   PHUR 6.0   SPECGRAV 1.005   PROTEINUA Negative   GLUCUA Negative   KETONESU Negative   BILIRUBINUA Negative   OCCULTUA 1+*   NITRITE Negative   LEUKOCYTESUR 3+*   RBCUA 1   WBCUA 6*   BACTERIA Few*   SQUAMEPITHEL 0     All pertinent labs within the past 24 hours have been reviewed.    Significant  Imaging:  Reviewed

## 2020-01-07 NOTE — ASSESSMENT & PLAN NOTE
Home medications: Lisinopril HCTZ 10/12.5  Home meds held in setting of lower BP, resume when appropriate    -- Monitor BP  -- Initiate home medication when appropriate

## 2020-01-07 NOTE — PROGRESS NOTES
[facility]  Urology  Progress Note    Patient Name: Kiko Gruber  MRN: [id number]  Admission Date: 1/6/2020  Hospital Length of Stay: 1 days  Code Status: Full Code   Attending Provider: Sadaf Jose MD   Primary Care Physician: Lala Juarez MD    Subjective:     HPI:  This is a 54 YOM with history of metastatic prostate CA with mets to small bowel s/p carmen orchiectomy, and bladder CA s/p cystoprostatectomy with creation of ileal conduit. He was admitted in 12/22/19 for SBO and underwent ex-lap with left ureteral resection requiring ureteral re-implant into the conduit.     He was recently admitted to [facility] due to feelings of dizziness and feeling generally unwell. He reports have concentrated urine output and high ostomy output, despite trying to match his losses with fluid intake. He was found to have an JORGE ALBERTO and was profoundly hyponatremic. He was fluid resuscitated and transferred [place] [facility] [place]. CT at the OSH showed a left ureteral stent traversing the new anastomosis and minimal left hydronephrosis. Urology was consulted for management of indwelling red rubber catheter and stent.     On evaluation, he is feeling much better. Still minimally hypotensive, but remains afebrile and non-tachycardic.     His Cr at OSH was 4.73 with baseline 1.0. It continues to trend down.   He is having excellent UOP.         Interval History:  Red rubber still attached via stitch but loose in the ostomy appliance   Cr improving, good UOP  No complaints     Objective:     Temp:  [97.5 °F (36.4 °C)-98.4 °F (36.9 °C)] 97.5 °F (36.4 °C)  Pulse:  [] 101  Resp:  [13-30] 29  SpO2:  [96 %-100 %] 100 %  BP: ()/(51-81) 104/77     Body mass index is 25.46 kg/m².           Drains     Drain                 Ileostomy 12/22/19 1704 Standard (Ewelina, end) LUQ 14 days         Urostomy 12/22/19 1654 ileal conduit RUQ 14 days                Physical Exam   Constitutional: He is oriented to  person, place, and time. He appears well-developed and well-nourished. No distress.   HENT:   Head: Normocephalic and atraumatic.   Eyes: No scleral icterus.   Neck: No tracheal deviation present.   Cardiovascular: Normal rate.    Pulmonary/Chest: Effort normal.   Abdominal: Soft.   Midline incision staples intact, no induration or erythema   Ileostomy left of midline draining light brown sucus  Urostomy right of midline draining clear light yellow urine    Musculoskeletal: Normal range of motion.   Neurological: He is alert and oriented to person, place, and time.   Skin: Skin is warm and dry. He is not diaphoretic.     Psychiatric: He has a normal mood and affect. His behavior is normal.       Significant Labs:    BMP:  Recent Labs   Lab 01/06/20  2030 01/07/20  0030 01/07/20  0435   * 120* 123*  123*   K 4.8 4.8 4.2  4.2   CL 92* 92* 95  95   CO2 17* 16* 16*  16*   BUN 40* 38* 37*  37*   CREATININE 1.8* 1.7* 1.5*  1.5*   CALCIUM 8.3* 8.5* 9.3  9.3       CBC:  Recent Labs   Lab 01/01/20  0625 01/06/20  0513 01/07/20  0435   WBC 10.57 8.34 9.07   HGB 10.9* 9.6* 11.1*   HCT 34.3* 28.5* 31.9*   * 405* 469*       All pertinent labs results from the past 24 hours have been reviewed.    Significant Imaging:  CT: I have reviewed all results within the past 24 hours and my personal findings are:  as below   CT from OSH 1/5 reviewed  Left kidney with mild hydronephrosis, proximal stent coil just distal to the UPJ, conduit decompressed with red rubber in good position, no urinoma present.      Assessment/Plan:     JORGE ALBERTO (acute kidney injury)  54 YOM with history cystoprostatectomy and b/l orchiectomy 2/2 metastatic prostate cancer and bladder cancer, presenting with JORGE ALBERTO and hyponatremia     -- patient is currently having good urine output and responding well to fluid resuscitation, there is no urologic intervention that is required at this time given stent in adequate position across the  anastomosis  --will replace red rubber and rec it stays in place for 1 more week. Will reschedule clinic appointment with us   -- continue fluid resuscitation per primary  -- continue to trend Cr         VTE Risk Mitigation (From admission, onward)         Ordered     IP VTE HIGH RISK PATIENT  Once      01/06/20 8813                Nita Cardoza MD  Urology  [facility]

## 2020-01-08 LAB
ALBUMIN SERPL BCP-MCNC: 3 G/DL (ref 3.5–5.2)
ALBUMIN SERPL BCP-MCNC: 3.5 G/DL (ref 3.5–5.2)
ALP SERPL-CCNC: 119 U/L (ref 55–135)
ALP SERPL-CCNC: 121 U/L (ref 55–135)
ALT SERPL W/O P-5'-P-CCNC: 38 U/L (ref 10–44)
ALT SERPL W/O P-5'-P-CCNC: 42 U/L (ref 10–44)
ANION GAP SERPL CALC-SCNC: 12 MMOL/L (ref 8–16)
ANION GAP SERPL CALC-SCNC: 13 MMOL/L (ref 8–16)
AST SERPL-CCNC: 18 U/L (ref 10–40)
AST SERPL-CCNC: 32 U/L (ref 10–40)
BASOPHILS # BLD AUTO: 0.1 K/UL (ref 0–0.2)
BASOPHILS NFR BLD: 0.9 % (ref 0–1.9)
BILIRUB SERPL-MCNC: 0.2 MG/DL (ref 0.1–1)
BILIRUB SERPL-MCNC: 0.2 MG/DL (ref 0.1–1)
BUN SERPL-MCNC: 45 MG/DL (ref 6–20)
BUN SERPL-MCNC: 46 MG/DL (ref 6–20)
CALCIUM SERPL-MCNC: 9.1 MG/DL (ref 8.7–10.5)
CALCIUM SERPL-MCNC: 9.9 MG/DL (ref 8.7–10.5)
CHLORIDE SERPL-SCNC: 89 MMOL/L (ref 95–110)
CHLORIDE SERPL-SCNC: 93 MMOL/L (ref 95–110)
CO2 SERPL-SCNC: 16 MMOL/L (ref 23–29)
CO2 SERPL-SCNC: 22 MMOL/L (ref 23–29)
CREAT SERPL-MCNC: 2.2 MG/DL (ref 0.5–1.4)
CREAT SERPL-MCNC: 2.6 MG/DL (ref 0.5–1.4)
DIFFERENTIAL METHOD: ABNORMAL
EOSINOPHIL # BLD AUTO: 0.1 K/UL (ref 0–0.5)
EOSINOPHIL NFR BLD: 0.8 % (ref 0–8)
ERYTHROCYTE [DISTWIDTH] IN BLOOD BY AUTOMATED COUNT: 13.8 % (ref 11.5–14.5)
EST. GFR  (AFRICAN AMERICAN): 30.7 ML/MIN/1.73 M^2
EST. GFR  (AFRICAN AMERICAN): 37.6 ML/MIN/1.73 M^2
EST. GFR  (NON AFRICAN AMERICAN): 26.6 ML/MIN/1.73 M^2
EST. GFR  (NON AFRICAN AMERICAN): 32.5 ML/MIN/1.73 M^2
GLUCOSE SERPL-MCNC: 122 MG/DL (ref 70–110)
GLUCOSE SERPL-MCNC: 94 MG/DL (ref 70–110)
HCT VFR BLD AUTO: 36.2 % (ref 40–54)
HGB BLD-MCNC: 12.1 G/DL (ref 14–18)
IMM GRANULOCYTES # BLD AUTO: 0.4 K/UL (ref 0–0.04)
IMM GRANULOCYTES NFR BLD AUTO: 3.6 % (ref 0–0.5)
LYMPHOCYTES # BLD AUTO: 1.9 K/UL (ref 1–4.8)
LYMPHOCYTES NFR BLD: 16.9 % (ref 18–48)
MAGNESIUM SERPL-MCNC: 1.9 MG/DL (ref 1.6–2.6)
MCH RBC QN AUTO: 29.7 PG (ref 27–31)
MCHC RBC AUTO-ENTMCNC: 33.4 G/DL (ref 32–36)
MCV RBC AUTO: 89 FL (ref 82–98)
MONOCYTES # BLD AUTO: 1 K/UL (ref 0.3–1)
MONOCYTES NFR BLD: 8.9 % (ref 4–15)
NEUTROPHILS # BLD AUTO: 7.7 K/UL (ref 1.8–7.7)
NEUTROPHILS NFR BLD: 68.9 % (ref 38–73)
NRBC BLD-RTO: 0 /100 WBC
PHOSPHATE SERPL-MCNC: 5.6 MG/DL (ref 2.7–4.5)
PLATELET # BLD AUTO: 552 K/UL (ref 150–350)
PMV BLD AUTO: 9.2 FL (ref 9.2–12.9)
POTASSIUM SERPL-SCNC: 4.9 MMOL/L (ref 3.5–5.1)
POTASSIUM SERPL-SCNC: 5.4 MMOL/L (ref 3.5–5.1)
PROT SERPL-MCNC: 7.4 G/DL (ref 6–8.4)
PROT SERPL-MCNC: 8.4 G/DL (ref 6–8.4)
RBC # BLD AUTO: 4.08 M/UL (ref 4.6–6.2)
SODIUM SERPL-SCNC: 122 MMOL/L (ref 136–145)
SODIUM SERPL-SCNC: 123 MMOL/L (ref 136–145)
WBC # BLD AUTO: 11.19 K/UL (ref 3.9–12.7)

## 2020-01-08 PROCEDURE — 63600175 PHARM REV CODE 636 W HCPCS: Performed by: STUDENT IN AN ORGANIZED HEALTH CARE EDUCATION/TRAINING PROGRAM

## 2020-01-08 PROCEDURE — 25000003 PHARM REV CODE 250: Performed by: STUDENT IN AN ORGANIZED HEALTH CARE EDUCATION/TRAINING PROGRAM

## 2020-01-08 PROCEDURE — 11000001 HC ACUTE MED/SURG PRIVATE ROOM

## 2020-01-08 PROCEDURE — 25000003 PHARM REV CODE 250: Performed by: NURSE PRACTITIONER

## 2020-01-08 PROCEDURE — 80053 COMPREHEN METABOLIC PANEL: CPT

## 2020-01-08 PROCEDURE — 84100 ASSAY OF PHOSPHORUS: CPT

## 2020-01-08 PROCEDURE — 94761 N-INVAS EAR/PLS OXIMETRY MLT: CPT

## 2020-01-08 PROCEDURE — S4991 NICOTINE PATCH NONLEGEND: HCPCS | Performed by: STUDENT IN AN ORGANIZED HEALTH CARE EDUCATION/TRAINING PROGRAM

## 2020-01-08 PROCEDURE — 83735 ASSAY OF MAGNESIUM: CPT

## 2020-01-08 PROCEDURE — 85025 COMPLETE CBC W/AUTO DIFF WBC: CPT

## 2020-01-08 PROCEDURE — 63600175 PHARM REV CODE 636 W HCPCS: Performed by: NURSE PRACTITIONER

## 2020-01-08 PROCEDURE — 99233 SBSQ HOSP IP/OBS HIGH 50: CPT | Mod: ,,, | Performed by: INTERNAL MEDICINE

## 2020-01-08 PROCEDURE — 99233 PR SUBSEQUENT HOSPITAL CARE,LEVL III: ICD-10-PCS | Mod: ,,, | Performed by: INTERNAL MEDICINE

## 2020-01-08 RX ADMIN — DIPHENOXYLATE HYDROCHLORIDE AND ATROPINE SULFATE 1 TABLET: 2.5; .025 TABLET ORAL at 08:01

## 2020-01-08 RX ADMIN — LOPERAMIDE HYDROCHLORIDE 4 MG: 2 CAPSULE ORAL at 05:01

## 2020-01-08 RX ADMIN — SODIUM CHLORIDE, SODIUM LACTATE, POTASSIUM CHLORIDE, AND CALCIUM CHLORIDE 1000 ML: .6; .31; .03; .02 INJECTION, SOLUTION INTRAVENOUS at 12:01

## 2020-01-08 RX ADMIN — ENOXAPARIN SODIUM 40 MG: 100 INJECTION SUBCUTANEOUS at 05:01

## 2020-01-08 RX ADMIN — Medication 1 PATCH: at 08:01

## 2020-01-08 RX ADMIN — LOPERAMIDE HYDROCHLORIDE 4 MG: 2 CAPSULE ORAL at 10:01

## 2020-01-08 RX ADMIN — DIPHENOXYLATE HYDROCHLORIDE AND ATROPINE SULFATE 1 TABLET: 2.5; .025 TABLET ORAL at 10:01

## 2020-01-08 RX ADMIN — PREDNISONE 10 MG: 10 TABLET ORAL at 08:01

## 2020-01-08 RX ADMIN — LOPERAMIDE HYDROCHLORIDE 4 MG: 2 CAPSULE ORAL at 08:01

## 2020-01-08 RX ADMIN — LOPERAMIDE HYDROCHLORIDE 4 MG: 2 CAPSULE ORAL at 12:01

## 2020-01-08 RX ADMIN — SODIUM BICARBONATE: 84 INJECTION, SOLUTION INTRAVENOUS at 12:01

## 2020-01-08 RX ADMIN — PANTOPRAZOLE SODIUM 40 MG: 40 TABLET, DELAYED RELEASE ORAL at 08:01

## 2020-01-08 NOTE — SUBJECTIVE & OBJECTIVE
Interval History/Significant Events:   No acute events over night.   Pt has no complaints. Denies abd pain or n/v. Reports poor PO intake.  Ostomy output has decreased and consistency is not as watery as yesterday.      Review of Systems   Constitutional: Negative for appetite change, chills, fatigue and fever.   HENT: Negative for postnasal drip and rhinorrhea.    Respiratory: Negative for cough, chest tightness and shortness of breath.    Cardiovascular: Negative for chest pain and palpitations.   Gastrointestinal: Negative for abdominal pain, nausea and vomiting.   Genitourinary: Negative for difficulty urinating.   Neurological: Negative for dizziness and weakness.     Objective:     Vital Signs (Most Recent):  Temp: 98 °F (36.7 °C) (01/08/20 0300)  Pulse: (!) 116 (01/08/20 0900)  Resp: 19 (01/08/20 0900)  BP: 103/75 (01/08/20 0900)  SpO2: (!) 84 % (01/08/20 0900) Vital Signs (24h Range):  Temp:  [97.6 °F (36.4 °C)-98 °F (36.7 °C)] 98 °F (36.7 °C)  Pulse:  [] 116  Resp:  [13-34] 19  SpO2:  [84 %-100 %] 84 %  BP: ()/(63-95) 103/75   Weight: 78.8 kg (173 lb 11.6 oz)  Body mass index is 25.65 kg/m².      Intake/Output Summary (Last 24 hours) at 1/8/2020 1017  Last data filed at 1/8/2020 0800  Gross per 24 hour   Intake 410 ml   Output 2280 ml   Net -1870 ml       Physical Exam   Constitutional: He is oriented to person, place, and time. He appears well-developed and well-nourished. No distress.   Eyes: Conjunctivae are normal.   Neck: Normal range of motion. Neck supple.   Cardiovascular: Normal rate and regular rhythm.   Pulmonary/Chest: Effort normal and breath sounds normal. No respiratory distress. He has no wheezes.   Abdominal: Soft. Bowel sounds are normal. There is no tenderness.   Midline surgical incision, staples in place. Ileostomy site in LLQ with yellow-green output. Urostomy site in RLQ. No signs of infection.    Musculoskeletal: Normal range of motion.   Neurological: He is alert and  oriented to person, place, and time.   Skin: Skin is warm and dry. He is not diaphoretic.   Psychiatric: He has a normal mood and affect.   Nursing note and vitals reviewed.           Lines/Drains/Airways     Central Venous Catheter Line                 Port A Cath Single Lumen -- days          Drain                 Ileostomy 12/22/19 1704 Standard (Ewelina, alvina) LUQ 16 days         Urostomy 12/22/19 1654 ileal conduit RUQ 16 days          Peripheral Intravenous Line                 Peripheral IV - Single Lumen 01/06/20 0500 20 G Anterior;Right Upper Arm 2 days              Significant Labs:    CBC/Anemia Profile:  Recent Labs   Lab 01/07/20  0435 01/08/20  0350   WBC 9.07 11.19   HGB 11.1* 12.1*   HCT 31.9* 36.2*   * 552*   MCV 86 89   RDW 13.4 13.8        Chemistries:  Recent Labs   Lab 01/06/20  1147  01/07/20  0435 01/07/20  0815 01/08/20  0350   *  118*   < > 123*  123* 122* 122*   K 4.0  4.0   < > 4.2  4.2 3.5 5.4*   CL 89*  89*   < > 95  95 93* 93*   CO2 20*  20*   < > 16*  16* 15* 16*   BUN 47*  47*   < > 37*  37* 35* 45*   CREATININE 2.4*  2.4*   < > 1.5*  1.5* 1.7* 2.6*   CALCIUM 8.1*  8.1*   < > 9.3  9.3 9.4 9.9   ALBUMIN  --   --  2.9*  --  3.5   PROT  --   --  7.4  --  8.4   BILITOT  --   --  0.2  --  0.2   ALKPHOS  --   --  88  --  119   ALT  --   --  33  --  38   AST  --   --  13  --  18   MG 1.9  --  1.4*  --  1.9   PHOS  --   --  3.7  --  5.6*    < > = values in this interval not displayed.     All pertinent labs within the past 24 hours have been reviewed.    Significant Imaging:  Reviewed

## 2020-01-08 NOTE — NURSING TRANSFER
Nursing Transfer Note      1/8/2020     Transfer To:     Transfer via wheelchair    Transported by [email or web address]    Medicines sent: yes     Chart send with patient: Yes    Upon arrival to floor: patient oriented to room, call bell in reach and bed in lowest position

## 2020-01-08 NOTE — ASSESSMENT & PLAN NOTE
Home medications: Lisinopril HCTZ 10/12.5  Home meds held in setting of lower BP, resume when appropriate    -- Monitor BP  -- Restart home medication when appropriate

## 2020-01-08 NOTE — HOSPITAL COURSE
54 y.o. M admitted to ICU for hypotension, hyponatremia and JORGE ALBERTO 2/2 increased ostomy output. Na initially rapidly corrected from 111 to 119 and pt was placed on D5 for 24 hours. Na trending up appropriately. Pt was evaluated by Gen Surg initiated Loperamide and Lomotil. Ostomy output decreased, but still above goals of 1L. Cr trending up and pt was administered 1L LR bolus and bicarb drip at 200 cc/hr. Pt stepped down and awaiting bed.

## 2020-01-08 NOTE — ASSESSMENT & PLAN NOTE
Patient BUN/ Cr is - 82/4.73  from a baseline Cr of 1. BUN/Cr is 52/3.2 following 3L IVF. Cr trending up. Likely from prerenal in setting of dehydration from poor po intake and increased ostomy ouput. No hydronephrosis seen on Renal US.     -- 1L LR bolus  -- Bicarb drip @ 200 cc/hr   -- Monitor RFT  -- Strict I/O  -- Avoid Nephrotoxic drugs  -- Renally dose Medications

## 2020-01-08 NOTE — ASSESSMENT & PLAN NOTE
Patient presented to OSH with Na of 111, found to be hypovolemic received 3L of IVF. Currently asymptomatic   Sodium in [facility] went up 119. Na stable at 122.     -- monitor closely with BMP Q4hr

## 2020-01-08 NOTE — PROGRESS NOTES
GENERAL SURGERY PROGRESS NOTE    Patient seen and examined at bedside. Bedside nurse overnight reporting significant decrease in output relative to the night prior. Recorded as 2150 mL past 24h (1300 AM, 850 PM). Contents slightly thicker contents in ostomy appliance than yesterday morning. Continue current anti-motility regimen. Replace losses 1:1 with crystalloid. Patient with JORGE ALBERTO (Cr 2.6) secondary to large volume losses in recent days. Will need resuscitation today.      Benjie Nevarez MD  Surgery Resident, PGY-V  Pager: [phone]  1/8/2020 9:15 AM

## 2020-01-08 NOTE — ASSESSMENT & PLAN NOTE
Patient with metastatic prostate cancer on chemo, received last 12/19 and missed next dose since was in hospital for SBO, underwent ex lap with small bowel resection with ileostomy creation and ureterneocystostomy by urology.   Patient's care is completely in [facility]. Takes Prednisone 5mg BID.     -- Prednisone 10 mg Daily

## 2020-01-08 NOTE — PROGRESS NOTES
[facility]  Critical Care Medicine  Progress Note    Patient Name: Kiko Gruber  MRN: [id number]  Admission Date: 1/6/2020  Hospital Length of Stay: 2 days  Code Status: Full Code  Attending Provider: Michelle Ray MD  Primary Care Provider: Lala Juarez MD   Principal Problem: Hyponatremia    Subjective:     HPI:  Kiko Gruber is a 54 y.o. with metastatic prostate cancer on chemo , HTN who was transferred from the [facility] after presenting for Dizziness.    Patient started having lightheadedness and tingling/numbness of his fingers, fatigue and toes for 2 days went to the OSH ED, He also noticed his UOP was reduced in his urostomy bag. OSH CTAP showed Minimal to moderate hydronephrosis  (left) following ureteral stent and resolution of perinephric edema, reduced from before (12/22/19), Colon diverticulosis. He was initially hypotensive and received 3L of IVF following which his BP started to improve and his MAPS have remained >65. OSH labs notable for Na 111, BUN/Cr 82/4.75. Patient is a smoker 2-3 ppd x 28 years, since last 1 week reduced to 1 ppd     Off note he was discharged from [facility] after he was found to have SBO, underwent ex lap with small bowel resection with ileostomy creation and ureterneocystostomy by urology. So patient was transferred here to have surgical services available          Hospital/ICU Course:  54 y.o. M admitted to ICU for hypotension, hyponatremia and JORGE ALBERTO 2/2 increased ostomy output. Na initially rapidly corrected from 111 to 119 and pt was placed on D5 for 24 hours. Na trending up appropriately. Pt was evaluated by Gen Surg initiated Loperamide and Lomotil. Ostomy output decreased, but still above goals of 1L. Cr trending up and pt was administered 1L LR bolus and bicarb drip at 200 cc/hr. Pt stepped down and awaiting bed.      Interval History/Significant Events:   No acute events over night.   Pt has no complaints. Denies abd pain or n/v. Reports  poor PO intake.  Ostomy output has decreased and consistency is not as watery as yesterday.      Review of Systems   Constitutional: Negative for appetite change, chills, fatigue and fever.   HENT: Negative for postnasal drip and rhinorrhea.    Respiratory: Negative for cough, chest tightness and shortness of breath.    Cardiovascular: Negative for chest pain and palpitations.   Gastrointestinal: Negative for abdominal pain, nausea and vomiting.   Genitourinary: Negative for difficulty urinating.   Neurological: Negative for dizziness and weakness.     Objective:     Vital Signs (Most Recent):  Temp: 98 °F (36.7 °C) (01/08/20 0300)  Pulse: (!) 116 (01/08/20 0900)  Resp: 19 (01/08/20 0900)  BP: 103/75 (01/08/20 0900)  SpO2: (!) 84 % (01/08/20 0900) Vital Signs (24h Range):  Temp:  [97.6 °F (36.4 °C)-98 °F (36.7 °C)] 98 °F (36.7 °C)  Pulse:  [] 116  Resp:  [13-34] 19  SpO2:  [84 %-100 %] 84 %  BP: ()/(63-95) 103/75   Weight: 78.8 kg (173 lb 11.6 oz)  Body mass index is 25.65 kg/m².      Intake/Output Summary (Last 24 hours) at 1/8/2020 1017  Last data filed at 1/8/2020 0800  Gross per 24 hour   Intake 410 ml   Output 2280 ml   Net -1870 ml       Physical Exam   Constitutional: He is oriented to person, place, and time. He appears well-developed and well-nourished. No distress.   Eyes: Conjunctivae are normal.   Neck: Normal range of motion. Neck supple.   Cardiovascular: Normal rate and regular rhythm.   Pulmonary/Chest: Effort normal and breath sounds normal. No respiratory distress. He has no wheezes.   Abdominal: Soft. Bowel sounds are normal. There is no tenderness.   Midline surgical incision, staples in place. Ileostomy site in LLQ with yellow-green output. Urostomy site in RLQ. No signs of infection.    Musculoskeletal: Normal range of motion.   Neurological: He is alert and oriented to person, place, and time.   Skin: Skin is warm and dry. He is not diaphoretic.   Psychiatric: He has a normal mood  and affect.   Nursing note and vitals reviewed.           Lines/Drains/Airways     Central Venous Catheter Line                 Port A Cath Single Lumen -- days          Drain                 Ileostomy 12/22/19 1704 Standard (Ewelina, alvina) LUQ 16 days         Urostomy 12/22/19 1654 ileal conduit RUQ 16 days          Peripheral Intravenous Line                 Peripheral IV - Single Lumen 01/06/20 0500 20 G Anterior;Right Upper Arm 2 days              Significant Labs:    CBC/Anemia Profile:  Recent Labs   Lab 01/07/20  0435 01/08/20  0350   WBC 9.07 11.19   HGB 11.1* 12.1*   HCT 31.9* 36.2*   * 552*   MCV 86 89   RDW 13.4 13.8        Chemistries:  Recent Labs   Lab 01/06/20  1147  01/07/20  0435 01/07/20  0815 01/08/20  0350   *  118*   < > 123*  123* 122* 122*   K 4.0  4.0   < > 4.2  4.2 3.5 5.4*   CL 89*  89*   < > 95  95 93* 93*   CO2 20*  20*   < > 16*  16* 15* 16*   BUN 47*  47*   < > 37*  37* 35* 45*   CREATININE 2.4*  2.4*   < > 1.5*  1.5* 1.7* 2.6*   CALCIUM 8.1*  8.1*   < > 9.3  9.3 9.4 9.9   ALBUMIN  --   --  2.9*  --  3.5   PROT  --   --  7.4  --  8.4   BILITOT  --   --  0.2  --  0.2   ALKPHOS  --   --  88  --  119   ALT  --   --  33  --  38   AST  --   --  13  --  18   MG 1.9  --  1.4*  --  1.9   PHOS  --   --  3.7  --  5.6*    < > = values in this interval not displayed.     All pertinent labs within the past 24 hours have been reviewed.    Significant Imaging:  Reviewed       ABG  No results for input(s): PH, PO2, PCO2, HCO3, BE in the last 168 hours.  Assessment/Plan:     Cardiac/Vascular  HTN (hypertension)  Home medications: Lisinopril HCTZ 10/12.5  Home meds held in setting of lower BP, resume when appropriate    -- Monitor BP  -- Restart home medication when appropriate           Renal/  * Hyponatremia  Patient presented to OSH with Na of 111, found to be hypovolemic received 3L of IVF. Currently asymptomatic   Sodium in [facility] went up 119. Na stable at 122.     -- monitor  closely with BMP Q4hr    JORGE ALBERTO (acute kidney injury)  Patient BUN/ Cr is - 82/4.73  from a baseline Cr of 1. BUN/Cr is 52/3.2 following 3L IVF. Cr trending up. Likely from prerenal in setting of dehydration from poor po intake and increased ostomy ouput. No hydronephrosis seen on Renal US.     -- 1L LR bolus  -- Bicarb drip @ 200 cc/hr   -- Monitor RFT  -- Strict I/O  -- Avoid Nephrotoxic drugs  -- Renally dose Medications    Hypomagnesemia  Monitor Daily and replace as appropriate.     Oncology  Prostate cancer  Patient with metastatic prostate cancer on chemo, received last 12/19 and missed next dose since was in hospital for SBO, underwent ex lap with small bowel resection with ileostomy creation and ureterneocystostomy by urology.   Patient's care is completely in [facility]. Takes Prednisone 5mg BID.     -- Prednisone 10 mg Daily      Other  Nicotine dependence  Patient is a smoker 2-3 ppd x 28 years, since last 1 week reduced to 1 ppd     -- Nicotine patch       Critical Care Daily Checklist:    A: Awake: RASS Goal/Actual Goal:    Actual: [place] Agitation Sedation Scale (RASS): Alert and calm   B: Spontaneous Breathing Trial Performed?  n/a   C: SAT & SBT Coordinated?  n/a                      D: Delirium: CAM-ICU Overall CAM-ICU: Negative   E: Early Mobility Performed? Yes   F: Feeding Goal:    Status:     Current Diet Order   Procedures    Diet Adult Regular (IDDSI Level 7) [facility]     Order Specific Question:   Indicate patient location for additional diet options:     Answer:   [facility]      AS: Analgesia/Sedation n/a   T: Thromboembolic Prophylaxis Lovenox   H: HOB > 300 Yes   U: Stress Ulcer Prophylaxis (if needed) n/a   G: Glucose Control n/a   B: Bowel Function Stool Occurrence: 1   I: Indwelling Catheter (Lines & Macias) Necessity PIV, urostomy and ileostomy    D: De-escalation of Antimicrobials/Pharmacotherapies n/a    Plan for the day/ETD Step down to floor    Code  Status:  Family/Goals of Care: Full Code         Critical secondary to Patient has a condition that poses threat to life and bodily function: hyponatremia and JORGE ALBERTO      Critical care was time spent personally by me on the following activities: development of treatment plan with patient or surrogate and bedside caregivers, discussions with consultants, evaluation of patient's response to treatment, examination of patient, ordering and performing treatments and interventions, ordering and review of laboratory studies, ordering and review of radiographic studies, pulse oximetry, re-evaluation of patient's condition. This critical care time did not overlap with that of any other provider or involve time for any procedures.     Ariela Pierre MD  Critical Care Medicine  [facility]

## 2020-01-08 NOTE — PLAN OF CARE
CMICU DAILY GOALS       A: Awake    RASS: Goal -  0 alert and calm  Actual -  0 alert and calm   Restraint necessity:  n/a  B: Breath   SBT: Not intubated   C: Coordinate A & B, analgesics/sedatives   Pain: managed    SAT: Not intubated  D: Delirium   CAM-ICU: Overall CAM-ICU: Negative  E: Early Mobility   MOVE Screen: Pass   Activity: Activity Management: activity adjusted per tolerance, activity clustered for rest period  FAS: Feeding/Nutrition   Diet order: Diet/Nutrition Received: regular,   Fluid restriction:    T: Thrombus   DVT prophylaxis: VTE Required Core Measure: Pharmacological prophylaxis initiated/maintained  H: HOB Elevation   Head of Bed (HOB): HOB flat  U: Ulcer Prophylaxis   GI: yes  G: Glucose control   managed    S: Skin   Bundle compliance: yes     Date: 1/7  B: Bowel Function   Goal to decrease output from ostomy    I: Indwelling Catheters   Macias necessity:     CVC necessity: No   IPAD offered: Yes  D: De-escalation Antibx   Yes  Plan for the day   Monitor electrolytes. Step down as appropriate  Family/Goals of care/Code Status   Code Status: Full Code     No acute events throughout day, VS and assessment per flow sheet, patient progressing towards goals as tolerated, plan of care reviewed with Kiko Gruber and family, all concerns addressed, will continue to monitor.    Problem: Adult Inpatient Plan of Care  Goal: Plan of Care Review  Outcome: Ongoing, Progressing    Problem: Adult Inpatient Plan of Care  Goal: Optimal Comfort and Wellbeing  Outcome: Ongoing, Progressing

## 2020-01-08 NOTE — PLAN OF CARE
Pt has no acute events during shift. Pt is AAOx4, VSS, Pt is ambulatory with standby assist. Pt is a unit collect. [facility]

## 2020-01-09 LAB
ALBUMIN SERPL BCP-MCNC: 3.1 G/DL (ref 3.5–5.2)
ALP SERPL-CCNC: 123 U/L (ref 55–135)
ALT SERPL W/O P-5'-P-CCNC: 47 U/L (ref 10–44)
ANION GAP SERPL CALC-SCNC: 10 MMOL/L (ref 8–16)
AST SERPL-CCNC: 25 U/L (ref 10–40)
BASOPHILS # BLD AUTO: 0.11 K/UL (ref 0–0.2)
BASOPHILS NFR BLD: 1.1 % (ref 0–1.9)
BILIRUB SERPL-MCNC: 0.2 MG/DL (ref 0.1–1)
BUN SERPL-MCNC: 46 MG/DL (ref 6–20)
CALCIUM SERPL-MCNC: 10.1 MG/DL (ref 8.7–10.5)
CHLORIDE SERPL-SCNC: 92 MMOL/L (ref 95–110)
CO2 SERPL-SCNC: 22 MMOL/L (ref 23–29)
CREAT SERPL-MCNC: 1.9 MG/DL (ref 0.5–1.4)
DIFFERENTIAL METHOD: ABNORMAL
EOSINOPHIL # BLD AUTO: 0.1 K/UL (ref 0–0.5)
EOSINOPHIL NFR BLD: 1 % (ref 0–8)
ERYTHROCYTE [DISTWIDTH] IN BLOOD BY AUTOMATED COUNT: 13.6 % (ref 11.5–14.5)
EST. GFR  (AFRICAN AMERICAN): 44.9 ML/MIN/1.73 M^2
EST. GFR  (NON AFRICAN AMERICAN): 38.8 ML/MIN/1.73 M^2
GLUCOSE SERPL-MCNC: 103 MG/DL (ref 70–110)
HCT VFR BLD AUTO: 31.8 % (ref 40–54)
HGB BLD-MCNC: 11.1 G/DL (ref 14–18)
IMM GRANULOCYTES # BLD AUTO: 0.21 K/UL (ref 0–0.04)
IMM GRANULOCYTES NFR BLD AUTO: 2.1 % (ref 0–0.5)
LYMPHOCYTES # BLD AUTO: 2.2 K/UL (ref 1–4.8)
LYMPHOCYTES NFR BLD: 22.6 % (ref 18–48)
MAGNESIUM SERPL-MCNC: 1.2 MG/DL (ref 1.6–2.6)
MCH RBC QN AUTO: 30.7 PG (ref 27–31)
MCHC RBC AUTO-ENTMCNC: 34.9 G/DL (ref 32–36)
MCV RBC AUTO: 88 FL (ref 82–98)
MONOCYTES # BLD AUTO: 1 K/UL (ref 0.3–1)
MONOCYTES NFR BLD: 9.7 % (ref 4–15)
NEUTROPHILS # BLD AUTO: 6.3 K/UL (ref 1.8–7.7)
NEUTROPHILS NFR BLD: 63.5 % (ref 38–73)
NRBC BLD-RTO: 0 /100 WBC
PHOSPHATE SERPL-MCNC: 3.7 MG/DL (ref 2.7–4.5)
PLATELET # BLD AUTO: 481 K/UL (ref 150–350)
PMV BLD AUTO: 9.5 FL (ref 9.2–12.9)
POTASSIUM SERPL-SCNC: 4.6 MMOL/L (ref 3.5–5.1)
PROT SERPL-MCNC: 7.3 G/DL (ref 6–8.4)
RBC # BLD AUTO: 3.62 M/UL (ref 4.6–6.2)
SODIUM SERPL-SCNC: 124 MMOL/L (ref 136–145)
WBC # BLD AUTO: 9.92 K/UL (ref 3.9–12.7)

## 2020-01-09 PROCEDURE — 63600175 PHARM REV CODE 636 W HCPCS: Performed by: STUDENT IN AN ORGANIZED HEALTH CARE EDUCATION/TRAINING PROGRAM

## 2020-01-09 PROCEDURE — 99232 PR SUBSEQUENT HOSPITAL CARE,LEVL II: ICD-10-PCS | Mod: ,,, | Performed by: INTERNAL MEDICINE

## 2020-01-09 PROCEDURE — 84100 ASSAY OF PHOSPHORUS: CPT

## 2020-01-09 PROCEDURE — 63600175 PHARM REV CODE 636 W HCPCS: Performed by: INTERNAL MEDICINE

## 2020-01-09 PROCEDURE — S4991 NICOTINE PATCH NONLEGEND: HCPCS | Performed by: STUDENT IN AN ORGANIZED HEALTH CARE EDUCATION/TRAINING PROGRAM

## 2020-01-09 PROCEDURE — 83735 ASSAY OF MAGNESIUM: CPT

## 2020-01-09 PROCEDURE — 25000003 PHARM REV CODE 250: Performed by: STUDENT IN AN ORGANIZED HEALTH CARE EDUCATION/TRAINING PROGRAM

## 2020-01-09 PROCEDURE — 11000001 HC ACUTE MED/SURG PRIVATE ROOM

## 2020-01-09 PROCEDURE — 80053 COMPREHEN METABOLIC PANEL: CPT

## 2020-01-09 PROCEDURE — 25000003 PHARM REV CODE 250: Performed by: INTERNAL MEDICINE

## 2020-01-09 PROCEDURE — 25000003 PHARM REV CODE 250: Performed by: GENERAL PRACTICE

## 2020-01-09 PROCEDURE — 85025 COMPLETE CBC W/AUTO DIFF WBC: CPT

## 2020-01-09 PROCEDURE — 99232 SBSQ HOSP IP/OBS MODERATE 35: CPT | Mod: ,,, | Performed by: INTERNAL MEDICINE

## 2020-01-09 PROCEDURE — 94761 N-INVAS EAR/PLS OXIMETRY MLT: CPT

## 2020-01-09 RX ORDER — DIPHENOXYLATE HYDROCHLORIDE AND ATROPINE SULFATE 2.5; .025 MG/1; MG/1
2 TABLET ORAL 2 TIMES DAILY
Status: DISCONTINUED | OUTPATIENT
Start: 2020-01-09 | End: 2020-01-09

## 2020-01-09 RX ORDER — SODIUM CHLORIDE 9 MG/ML
INJECTION, SOLUTION INTRAVENOUS CONTINUOUS
Status: DISCONTINUED | OUTPATIENT
Start: 2020-01-09 | End: 2020-01-11 | Stop reason: HOSPADM

## 2020-01-09 RX ORDER — LANOLIN ALCOHOL/MO/W.PET/CERES
400 CREAM (GRAM) TOPICAL 2 TIMES DAILY
Status: DISCONTINUED | OUTPATIENT
Start: 2020-01-09 | End: 2020-01-10

## 2020-01-09 RX ORDER — DIPHENOXYLATE HYDROCHLORIDE AND ATROPINE SULFATE 2.5; .025 MG/1; MG/1
2 TABLET ORAL 2 TIMES DAILY
Status: DISCONTINUED | OUTPATIENT
Start: 2020-01-10 | End: 2020-01-11 | Stop reason: HOSPADM

## 2020-01-09 RX ORDER — MAGNESIUM SULFATE HEPTAHYDRATE 40 MG/ML
2 INJECTION, SOLUTION INTRAVENOUS ONCE
Status: COMPLETED | OUTPATIENT
Start: 2020-01-09 | End: 2020-01-10

## 2020-01-09 RX ORDER — MAGNESIUM SULFATE HEPTAHYDRATE 40 MG/ML
2 INJECTION, SOLUTION INTRAVENOUS
Status: COMPLETED | OUTPATIENT
Start: 2020-01-09 | End: 2020-01-09

## 2020-01-09 RX ORDER — DIPHENOXYLATE HYDROCHLORIDE AND ATROPINE SULFATE 2.5; .025 MG/1; MG/1
1 TABLET ORAL 2 TIMES DAILY
Status: DISCONTINUED | OUTPATIENT
Start: 2020-01-10 | End: 2020-01-09

## 2020-01-09 RX ADMIN — DIPHENOXYLATE HYDROCHLORIDE AND ATROPINE SULFATE 1 TABLET: 2.5; .025 TABLET ORAL at 09:01

## 2020-01-09 RX ADMIN — PREDNISONE 10 MG: 10 TABLET ORAL at 09:01

## 2020-01-09 RX ADMIN — ENOXAPARIN SODIUM 40 MG: 100 INJECTION SUBCUTANEOUS at 04:01

## 2020-01-09 RX ADMIN — Medication 400 MG: at 04:01

## 2020-01-09 RX ADMIN — MAGNESIUM SULFATE IN WATER 2 G: 40 INJECTION, SOLUTION INTRAVENOUS at 09:01

## 2020-01-09 RX ADMIN — LOPERAMIDE HYDROCHLORIDE 4 MG: 2 CAPSULE ORAL at 01:01

## 2020-01-09 RX ADMIN — LOPERAMIDE HYDROCHLORIDE 4 MG: 2 CAPSULE ORAL at 04:01

## 2020-01-09 RX ADMIN — DIPHENOXYLATE HYDROCHLORIDE AND ATROPINE SULFATE 2 TABLET: 2.5; .025 TABLET ORAL at 09:01

## 2020-01-09 RX ADMIN — MAGNESIUM SULFATE IN WATER 2 G: 40 INJECTION, SOLUTION INTRAVENOUS at 05:01

## 2020-01-09 RX ADMIN — SCOPALAMINE 1 PATCH: 1 PATCH, EXTENDED RELEASE TRANSDERMAL at 07:01

## 2020-01-09 RX ADMIN — MAGNESIUM SULFATE IN WATER 2 G: 40 INJECTION, SOLUTION INTRAVENOUS at 11:01

## 2020-01-09 RX ADMIN — LOPERAMIDE HYDROCHLORIDE 4 MG: 2 CAPSULE ORAL at 09:01

## 2020-01-09 RX ADMIN — PANTOPRAZOLE SODIUM 40 MG: 40 TABLET, DELAYED RELEASE ORAL at 09:01

## 2020-01-09 RX ADMIN — Medication 1 PATCH: at 09:01

## 2020-01-09 RX ADMIN — SODIUM CHLORIDE: 0.9 INJECTION, SOLUTION INTRAVENOUS at 05:01

## 2020-01-09 NOTE — PROGRESS NOTES
Ostomy nurse follow up.    Urostomy and ileostomy appliance changed.     Urostomy with stent and red rubber in place. Peristomal skin intact and protected with skin prep. New pouch applied and attached to carlos bag.     Ileostomy appliance changed. Patient said they changed it last night but cut hole too big. Stoma is now approx 25 mm as it is no longer edematous. Excoriation noted to peristomal skin. AF powder and spray applied. New pouch cut to fit and applied. Patient with supplies for home. He is comfortable managing the ostomies himself.   Please see Gen Surg note for home ostomy care.   Blanquita Dubois RN CWCN CN   [phone]

## 2020-01-09 NOTE — PLAN OF CARE
01/09/20 0845   Post-Acute Status   Post-Acute Authorization Other   Other Status No Post-Acute Service Needs

## 2020-01-09 NOTE — PROGRESS NOTES
GENERAL SURGERY PROGRESS NOTE    Patient seen and examined at bedside. Ileostomy output decreased past 24h. Total of 1405 mL (755 mL + 2 unmeasured in AM, 650 mL in PM). Patient states he is leaving today. Reminded him that leaving AMA and not taking his anti-motility agents is the exact reason he is currently in the hospital with JORGE ALBERTO and hydration issues, which are improving but not yet returned to baseline. Continue loperamide 4 mg QID.  Increased Lomotil to 2 tablets BID. Goal is ileostomy output < 1 L over 24h to prevent dehydration. See below general ileostomy care instructions should patient leave today.      OSTOMY CARE:  General Instructions:  - Instruct patient/caregiver to empty bag when full and PRN.   - Change and clean site every 48 hours.  - Monitor skin integrity.  - Monitor the appearance of the stoma. The tissue should be moist and pink or red in color.  - Increased risk of dehydration for patients with an ileostomy. Recommended daily fluid intake is 8 to 10 eight-ounce drinks or enough to match ostomy losses.  - Encourage the patient to monitor bowel function every day by recording outputs.     Bridger LOPEZ if:  - Purple, black, or white stoma   - Severe cramps lasting more than 6 hours  - Severe watery discharge from the stoma that does not resovle  - No output or markedly decreased output from the ostomy for an extended period of time  - Excessive bleeding from stoma  - Swelling of stoma to more than 1/2-inch larger than usual  - Inward retraction of stoma to below skin level  - Severe skin irritation or deep ulcers  - Bulging or other changes in the abdomen    Notify ostomy care nurse if:  - Frequent leaking of pouching system  - Change in size or appearance of stoma, causing discomfort or problems with pouch  - Skin rash or rawness  - Weight gain or loss that causes problems with pouch      Benjie Nevarez MD  Surgery Resident, PGY-V  Pager: [phone]  1/9/2020 8:13 AM

## 2020-01-10 LAB
ALBUMIN SERPL BCP-MCNC: 3.1 G/DL (ref 3.5–5.2)
ALP SERPL-CCNC: 115 U/L (ref 55–135)
ALT SERPL W/O P-5'-P-CCNC: 53 U/L (ref 10–44)
ANION GAP SERPL CALC-SCNC: 8 MMOL/L (ref 8–16)
ANION GAP SERPL CALC-SCNC: 9 MMOL/L (ref 8–16)
AST SERPL-CCNC: 26 U/L (ref 10–40)
BASOPHILS # BLD AUTO: 0.13 K/UL (ref 0–0.2)
BASOPHILS NFR BLD: 1.3 % (ref 0–1.9)
BILIRUB SERPL-MCNC: 0.2 MG/DL (ref 0.1–1)
BUN SERPL-MCNC: 38 MG/DL (ref 6–20)
BUN SERPL-MCNC: 45 MG/DL (ref 6–20)
CALCIUM SERPL-MCNC: 9 MG/DL (ref 8.7–10.5)
CALCIUM SERPL-MCNC: 9.6 MG/DL (ref 8.7–10.5)
CHLORIDE SERPL-SCNC: 100 MMOL/L (ref 95–110)
CHLORIDE SERPL-SCNC: 95 MMOL/L (ref 95–110)
CO2 SERPL-SCNC: 21 MMOL/L (ref 23–29)
CO2 SERPL-SCNC: 21 MMOL/L (ref 23–29)
CREAT SERPL-MCNC: 1.4 MG/DL (ref 0.5–1.4)
CREAT SERPL-MCNC: 1.4 MG/DL (ref 0.5–1.4)
DIFFERENTIAL METHOD: ABNORMAL
EOSINOPHIL # BLD AUTO: 0.2 K/UL (ref 0–0.5)
EOSINOPHIL NFR BLD: 1.6 % (ref 0–8)
ERYTHROCYTE [DISTWIDTH] IN BLOOD BY AUTOMATED COUNT: 13.7 % (ref 11.5–14.5)
EST. GFR  (AFRICAN AMERICAN): >60 ML/MIN/1.73 M^2
EST. GFR  (AFRICAN AMERICAN): >60 ML/MIN/1.73 M^2
EST. GFR  (NON AFRICAN AMERICAN): 56.2 ML/MIN/1.73 M^2
EST. GFR  (NON AFRICAN AMERICAN): 56.2 ML/MIN/1.73 M^2
GLUCOSE SERPL-MCNC: 107 MG/DL (ref 70–110)
GLUCOSE SERPL-MCNC: 94 MG/DL (ref 70–110)
HCT VFR BLD AUTO: 31.8 % (ref 40–54)
HGB BLD-MCNC: 10.7 G/DL (ref 14–18)
IMM GRANULOCYTES # BLD AUTO: 0.26 K/UL (ref 0–0.04)
IMM GRANULOCYTES NFR BLD AUTO: 2.5 % (ref 0–0.5)
LYMPHOCYTES # BLD AUTO: 2 K/UL (ref 1–4.8)
LYMPHOCYTES NFR BLD: 19 % (ref 18–48)
MAGNESIUM SERPL-MCNC: 2.7 MG/DL (ref 1.6–2.6)
MCH RBC QN AUTO: 30.1 PG (ref 27–31)
MCHC RBC AUTO-ENTMCNC: 33.6 G/DL (ref 32–36)
MCV RBC AUTO: 89 FL (ref 82–98)
MONOCYTES # BLD AUTO: 0.7 K/UL (ref 0.3–1)
MONOCYTES NFR BLD: 6.7 % (ref 4–15)
NEUTROPHILS # BLD AUTO: 7.1 K/UL (ref 1.8–7.7)
NEUTROPHILS NFR BLD: 68.9 % (ref 38–73)
NRBC BLD-RTO: 0 /100 WBC
PHOSPHATE SERPL-MCNC: 3.9 MG/DL (ref 2.7–4.5)
PLATELET # BLD AUTO: 499 K/UL (ref 150–350)
PMV BLD AUTO: 9.4 FL (ref 9.2–12.9)
POTASSIUM SERPL-SCNC: 4.8 MMOL/L (ref 3.5–5.1)
POTASSIUM SERPL-SCNC: 5.2 MMOL/L (ref 3.5–5.1)
PROT SERPL-MCNC: 7.2 G/DL (ref 6–8.4)
RBC # BLD AUTO: 3.56 M/UL (ref 4.6–6.2)
SODIUM SERPL-SCNC: 125 MMOL/L (ref 136–145)
SODIUM SERPL-SCNC: 129 MMOL/L (ref 136–145)
WBC # BLD AUTO: 10.35 K/UL (ref 3.9–12.7)

## 2020-01-10 PROCEDURE — 63600175 PHARM REV CODE 636 W HCPCS: Performed by: INTERNAL MEDICINE

## 2020-01-10 PROCEDURE — 83735 ASSAY OF MAGNESIUM: CPT

## 2020-01-10 PROCEDURE — 25000003 PHARM REV CODE 250: Performed by: STUDENT IN AN ORGANIZED HEALTH CARE EDUCATION/TRAINING PROGRAM

## 2020-01-10 PROCEDURE — 99232 PR SUBSEQUENT HOSPITAL CARE,LEVL II: ICD-10-PCS | Mod: ,,, | Performed by: INTERNAL MEDICINE

## 2020-01-10 PROCEDURE — 99232 SBSQ HOSP IP/OBS MODERATE 35: CPT | Mod: ,,, | Performed by: INTERNAL MEDICINE

## 2020-01-10 PROCEDURE — S4991 NICOTINE PATCH NONLEGEND: HCPCS | Performed by: STUDENT IN AN ORGANIZED HEALTH CARE EDUCATION/TRAINING PROGRAM

## 2020-01-10 PROCEDURE — 97802 MEDICAL NUTRITION INDIV IN: CPT

## 2020-01-10 PROCEDURE — 25000003 PHARM REV CODE 250: Performed by: INTERNAL MEDICINE

## 2020-01-10 PROCEDURE — 85025 COMPLETE CBC W/AUTO DIFF WBC: CPT

## 2020-01-10 PROCEDURE — 80053 COMPREHEN METABOLIC PANEL: CPT

## 2020-01-10 PROCEDURE — 80048 BASIC METABOLIC PNL TOTAL CA: CPT

## 2020-01-10 PROCEDURE — 11000001 HC ACUTE MED/SURG PRIVATE ROOM

## 2020-01-10 PROCEDURE — 84100 ASSAY OF PHOSPHORUS: CPT

## 2020-01-10 PROCEDURE — 94761 N-INVAS EAR/PLS OXIMETRY MLT: CPT

## 2020-01-10 PROCEDURE — 63600175 PHARM REV CODE 636 W HCPCS: Performed by: STUDENT IN AN ORGANIZED HEALTH CARE EDUCATION/TRAINING PROGRAM

## 2020-01-10 RX ADMIN — LOPERAMIDE HYDROCHLORIDE 4 MG: 2 CAPSULE ORAL at 05:01

## 2020-01-10 RX ADMIN — SODIUM CHLORIDE: 0.9 INJECTION, SOLUTION INTRAVENOUS at 12:01

## 2020-01-10 RX ADMIN — PANTOPRAZOLE SODIUM 40 MG: 40 TABLET, DELAYED RELEASE ORAL at 09:01

## 2020-01-10 RX ADMIN — LOPERAMIDE HYDROCHLORIDE 4 MG: 2 CAPSULE ORAL at 09:01

## 2020-01-10 RX ADMIN — DIPHENOXYLATE HYDROCHLORIDE AND ATROPINE SULFATE 2 TABLET: 2.5; .025 TABLET ORAL at 09:01

## 2020-01-10 RX ADMIN — Medication 1 PATCH: at 09:01

## 2020-01-10 RX ADMIN — ENOXAPARIN SODIUM 40 MG: 100 INJECTION SUBCUTANEOUS at 05:01

## 2020-01-10 RX ADMIN — LOPERAMIDE HYDROCHLORIDE 4 MG: 2 CAPSULE ORAL at 12:01

## 2020-01-10 RX ADMIN — PREDNISONE 10 MG: 10 TABLET ORAL at 09:01

## 2020-01-10 NOTE — PROGRESS NOTES
[facility] Hospital Medicine  Progress Note    Patient Name: Kiko Gruber  MRN: [id number]  Patient Class: IP- Inpatient   Admission Date: 1/6/2020  Length of Stay: 4 days  Attending Physician: Michelle Ray MD  Primary Care Provider: Lala Juarez MD    [facility] Medicine Team: [facility] D Michelle Ray MD    HPI:   54 y.o. Male with metastatic prostate cancer on chemo, s/p ex lap with small bowel resection and end ileostomy placement with ureterneocystostomy by Urology on 12/22/19 admitted for hyponatremia, hypomagnesemia and JORGE ALBERTO due to high ostomy output. Evaluated by Gen Surg and Urology. On Loperamide and Lomotil. Hyponatremia and JORGE ALBETRO improving.     Subjective:     Principal Problem:Hyponatremia    Interval History:  Patient remains eager to go home. Colostomy output reduced.    Review of Systems   Constitutional: Negative for fever.   Respiratory: Negative for shortness of breath.      Objective:     Vital Signs (Most Recent):  Temp: 96.8 °F (36 °C) (01/10/20 1144)  Pulse: 79 (01/10/20 1144)  Resp: 18 (01/10/20 1144)  BP: 111/71 (01/10/20 1144)  SpO2: 100 % (01/10/20 1309) Vital Signs (24h Range):  Temp:  [96.5 °F (35.8 °C)-98.4 °F (36.9 °C)] 96.8 °F (36 °C)  Pulse:  [79-94] 79  Resp:  [14-18] 18  SpO2:  [99 %-100 %] 100 %  BP: ()/(62-82) 111/71     Weight: 78.8 kg (173 lb 11.6 oz)  Body mass index is 25.65 kg/m².    Intake/Output Summary (Last 24 hours) at 1/10/2020 1331  Last data filed at 1/10/2020 0742  Gross per 24 hour   Intake 1750 ml   Output 3700 ml   Net -1950 ml      Physical Exam   Eyes: Conjunctivae and lids are normal.   Cardiovascular: S1 normal and S2 normal.   Pulmonary/Chest: Effort normal and breath sounds normal.   Abdominal: Soft. Bowel sounds are normal. There is no tenderness.   Ileostomy, colostomy   Musculoskeletal: He exhibits no edema.   Neurological: He is alert. He is not disoriented.       Significant Labs:     CBC:   Recent Labs    Lab 01/09/20  0648 01/10/20  0510   WBC 9.92 10.35   HGB 11.1* 10.7*   HCT 31.8* 31.8*   * 499*     CMP:   Recent Labs   Lab 01/08/20  1745 01/09/20  1030 01/10/20  0510   * 124* 125*   K 4.9 4.6 4.8   CL 89* 92* 95   CO2 22* 22* 21*   * 103 94   BUN 46* 46* 45*   CREATININE 2.2* 1.9* 1.4   CALCIUM 9.1 10.1 9.6   PROT 7.4 7.3 7.2   ALBUMIN 3.0* 3.1* 3.1*   BILITOT 0.2 0.2 0.2   ALKPHOS 121 123 115   AST 32 25 26   ALT 42 47* 53*   ANIONGAP 12 10 9   EGFRNONAA 32.5* 38.8* 56.2*     Magnesium:   Recent Labs   Lab 01/09/20  0648 01/10/20  0510   MG 1.2* 2.7*     Significant Imaging: .    Assessment/Plan:      Active Diagnoses:    Diagnosis Date Noted POA    PRINCIPAL PROBLEM:  Hyponatremia [E87.1] 12/30/2019 Yes    JORGE ALBERTO (acute kidney injury) [N17.9] 01/06/2020 Yes    Prostate cancer [C61] 01/06/2020 Yes    Nicotine dependence [F17.200] 12/30/2019 Yes    Hypomagnesemia [E83.42] 12/30/2019 Yes    HTN (hypertension) [I10] 12/30/2019 Yes      Problems Resolved During this Admission:     Overview / Hospital Course:   Sodium over-corrected initially and started on D5 drip for 24 hours. Na improved to122. He has been started on Loperamide and Lomotil. Electrolyte derangement likely due to high ostomy output, goal of </= 1L ostomy output/day.     Inpatient Medications prescribed for management of Current Problems:   Scheduled Meds:    diphenoxylate-atropine 2.5-0.025 mg  2 tablet Oral BID    enoxaparin  40 mg Subcutaneous Daily    loperamide  4 mg Oral QID    nicotine  1 patch Transdermal Daily    pantoprazole  40 mg Oral Daily    predniSONE  10 mg Oral Daily    scopolamine  1 patch Transdermal Q3 Days     Continuous Infusions:    sodium chloride 0.9% 75 mL/hr at 01/10/20 1238     As Needed: miconazole NITRATE 2 %, oxyCODONE, sodium chloride 0.9%    Assessment and Plan by Problem    HTN (hypertension)  Home medications: Lisinopril-HCTZ 10/12.5  Home meds held in setting of lower BP, resume  when appropriate  -- Monitor BP     Hyponatremia  Volume depletion  Patient presented to OSH with Na of 111, found to be hypovolemic received 3L of IVF.   Currently asymptomatic   Sodium went up to 119. Na stable at 122 - 124.   -- monitor closely   -- resumed gentle hydration with NS  Ileostomy output decreased over past 24h. Continue loperamide 4 mg QID.  Lomotil increased to 2 tablets BID. Goal is ileostomy output < 1 L over 24h to prevent dehydration.   Continuing current management with NS     JORGE ALBERTO (acute kidney injury)  BUN/ sCr  - 82/4.73  from a baseline Cr of ~ 1. Likely from prerenal in setting of dehydration from poor po intake and increased ostomy ouput. No hydronephrosis seen on Renal US.   -- 1L LR bolus  -- Bicarb drip @ 200 cc/hr   -- Monitor RFT  -- Strict I/O  -- Avoid Nephrotoxic drugs  -- Renally dose Medications  -Improved     Hypomagnesemia  Monitor Daily and replace as appropriate.      Prostate cancer  Patient with metastatic prostate cancer on chemo, received last 12/19 and missed next dose since was in hospital for SBO, underwent ex lap with small bowel resection with ileostomy creation and ureterneocystostomy by Urology.   Patient's care is completely in [facility]. Takes Prednisone 5mg BID.   -- Prednisone 10 mg Daily     Nicotine dependence  Patient is a smoker 2-3 ppd x 28 years, since last 1 week reduced to 1 ppd   -- Nicotine patch    Diet Adult Regular (IDDSI Level 7) [facility]    Significant LDAs:     HIGH RISK CONDITION(S):   Patient has a condition that poses threat to life and bodily function: Acute Renal Failure     Discharge plan and follow up  Home or Self Care    VTE Risk Mitigation (From admission, onward)         Ordered     enoxaparin injection 40 mg  Daily      01/07/20 1056     IP VTE HIGH RISK PATIENT  Once      01/06/20 9338                Michelle Ray MD  Department of Hospital Medicine   [facility]

## 2020-01-10 NOTE — CONSULTS
"  [facility]  Adult Nutrition  Consult Note    SUMMARY     Recommendations  Pt with moderate malnutrition    Recommendation:   1. Continue regular diet, encourage good PO intake, tolerable meals   2. discontinue boost plus, pt refuses ONS    -Encouraged ONS at home  3. Suggest MVI   4. RD to monitor and follow up    Goals: pt to consume >85% of EEN/EPN by RD follow up   Nutrition Goal Status: new  Communication of RD Recs: other (comment)(POC)    Reason for Assessment    Reason For Assessment: consult  Diagnosis: (hyponatremia)  Relevant Medical History: HTN; Prostate cancer  Interdisciplinary Rounds: did not attend  General Information Comments: Pt resting in bed, endorses good appetite and intake. Pt received outside food for breakfast and states intake PTA good. Encouraged pt to eat more absorbant food, and avoid fast food/fried food. States he follows a good diet at home. Pt drinks pedilyte and gatorade as ONS at home, encouraged protein ONS to aid in intake. Pt verbalzed understanding. Dislikes boost, refuses any other ONS at this time. Endorses wt loss of ~25 lbs over past 5 years 2/2 chemo treatment.  lbs. NFPE completed, pt with moderate muscle and fat loss, meets criteria for moderate malnutrition.   Nutrition Discharge Planning: adequate intake via regular diet and ONS     Nutrition Risk Screen    Nutrition Risk Screen: other (see comments)(poor appetite, nausea)    Nutrition/Diet History    Patient Reported Diet/Restrictions/Preferences: general  Spiritual, Cultural Beliefs, Tenriism Practices, Values that Affect Care: no  Food Allergies: NKFA  Factors Affecting Nutritional Intake: None identified at this time    Anthropometrics    Temp: 98.4 °F (36.9 °C)  Height Method: Stated  Height: 5' 9" (175.3 cm)  Height (inches): 69 in  Weight Method: Bed Scale  Weight: 78.8 kg (173 lb 11.6 oz)  Weight (lb): 173.72 lb  Ideal Body Weight (IBW), Male: 160 lb  % Ideal Body Weight, Male " (lb): 107.75 %  BMI (Calculated): 25.6  BMI Grade: 25 - 29.9 - overweight  Usual Body Weight (UBW), k.18 kg  % Usual Body Weight: 84.74    Lab/Procedures/Meds    Pertinent Labs Reviewed: reviewed  Pertinent Labs Comments: Na 125; BUN 5; Mg 2.7  Pertinent Medications Reviewed: reviewed  Pertinent Medications Comments: prednisone; magnesium sulfate; enoxaparin     Estimated/Assessed Needs    Weight Used For Calorie Calculations: 78.8 kg (173 lb 11.6 oz)  Energy Calorie Requirements (kcal):  kcal/d  Energy Need Method: Quay-St Jeor(x1.3)  Protein Requirements:  g/day   Weight Used For Protein Calculations: 78.8 kg (173 lb 11.6 oz)  Fluid Requirements (mL): 1 mL/kcal or per MD  RDA Method (mL):     Nutrition Prescription Ordered    Current Diet Order: Regular diet  Oral Nutrition Supplement: boost plus     Evaluation of Received Nutrient/Fluid Intake    I/O: -10.5 L since admit  Energy Calories Required: meeting needs  Protein Required: meeting needs  Comments: LBM   Tolerance: tolerating  % Intake of Estimated Energy Needs: 75 - 100 %  % Meal Intake: 75 - 100 %    Nutrition Risk    Level of Risk/Frequency of Follow-up: low     Assessment and Plan    Nutrition Problem:   Moderate  Protein-Calorie Malnutrition  Malnutrition in the context of Chronic Illness/Injury    Related to (etiology):  Increased nutrient needs, chemo treatment, colostomy     Signs and Symptoms (as evidenced by):  Energy Intake: <75% of estimated energy requirement for x 1 week   Body Fat Depletion: mild and moderate depletion of orbitals and triceps   Muscle Mass Depletion: mild and moderate depletion of temples, clavicle region, interosseous muscle and lower extremities   Weight Loss: 15% x 5 years (per pt)     Interventions(treatment strategy):  Collaboration of care with other providers  [email or web address]     Nutrition Diagnosis Status:  New    Monitor and Evaluation    Food and Nutrient Intake: energy intake, food and  beverage intake  Food and Nutrient Adminstration: diet order  Anthropometric Measurements: weight, weight change  Biochemical Data, Medical Tests and Procedures: electrolyte and renal panel, lipid profile, gastrointestinal profile, glucose/endocrine profile, inflammatory profile  Nutrition-Focused Physical Findings: overall appearance     Malnutrition Assessment  Malnutrition Type: chronic illness  Weight Loss (Malnutrition): (15% x 5 years)  Energy Intake (Malnutrition): less than 75% for greater than 7 days  Subcutaneous Fat (Malnutrition): moderate depletion  Muscle Mass (Malnutrition): moderate depletion   Orbital Region (Subcutaneous Fat Loss): mild depletion  Upper Arm Region (Subcutaneous Fat Loss): mild depletion   [place] Region (Muscle Loss): moderate depletion  Clavicle Bone Region (Muscle Loss): moderate depletion  Clavicle and Acromion Bone Region (Muscle Loss): moderate depletion  Dorsal Hand (Muscle Loss): mild depletion  Anterior Thigh Region (Muscle Loss): mild depletion  Posterior Calf Region (Muscle Loss): mild depletion       Nutrition Follow-Up    RD Follow-up?: Yes

## 2020-01-10 NOTE — PLAN OF CARE
01/10/20 0903   Post-Acute Status   Post-Acute Authorization Other   Other Status No Post-Acute Service Needs

## 2020-01-10 NOTE — PHYSICIAN QUERY
"PT Name: Kiko Gruber  MR #: [id number]    Physician Query Form - Hematology Clarification      CDS/: Mary Ann Mays RN              Contact information:[email or web address]    This form is a permanent document in the medical record.      Query Date: January 10, 2020    By submitting this query, we are merely seeking further clarification of documentation. Please utilize your independent clinical judgment when addressing the question(s) below.    The Medical record contains the following:   Indicators  Supporting Clinical Findings Location in Medical Record    "Anemia" documented     X H & H =  Labs   X BP =                     HR=  Vitals Flowsheet    "GI bleeding" documented      Acute bleeding (Non GI site)      Transfusion(s)     X Treatment: CBC auto differential daily MD orders 1/6   X Other:  Lovenox 40mg SQ every 24 hours  NS infusion 75cc/hr continuous  Prednisone 10mg PO daily    54 y.o. Male with metastatic prostate cancer on chemo, s/p ex lap with small bowel resection and end ileostomy placement with ureterneocystostomy by Urology on 12/22/19 admitted for hyponatremia, hypomagnesemia and JORGE ALBERTO due to high ostomy output [facility] medicine 1/9     Provider, please specify diagnosis or diagnoses associated with above clinical findings.    [  ] Chronic blood loss anemia     [ x ] Anemia of chronic disease ( Specify chronic disease)       [ x ] Other (Specify): neoplastic   [  ] Clinically Undetermined       [  ] Other Hematological Diagnosis (please specify):     [  ] Clinically Undetermined       Please document in your progress notes daily for the duration of treatment, until resolved, and include in your discharge summary.                                                                                                      "

## 2020-01-10 NOTE — PLAN OF CARE
01/10/20 1419   Discharge Reassessment   Assessment Type Discharge Planning Reassessment   Provided patient/caregiver education on the expected discharge date and the discharge plan Yes   Do you have any problems affording any of your prescribed medications? No   Discharge Plan A Home with family   Discharge Plan B Home with family   DME Needed Upon Discharge  none   Patient choice form signed by patient/caregiver N/A   Anticipated Discharge Disposition Home   Can the patient answer the patient profile reliably? Yes, cognitively intact   Post-Acute Status   Post-Acute Authorization Other   Other Status No Post-Acute Service Needs

## 2020-01-10 NOTE — PLAN OF CARE
Problem: Malnutrition  Goal: Improved Nutritional Intake  Outcome: Ongoing, Progressing   Recommendations  Pt with moderate malnutrition    Recommendation:   1. Continue regular diet, encourage good PO intake, tolerable meals   2. discontinue boost plus, pt refuses ONS    -Encouraged ONS at home  3. Suggest MVI   4. RD to monitor and follow up    Goals: pt to consume >85% of EEN/EPN by RD follow up   Nutrition Goal Status: new  Communication of RD Recs: other (comment)(POC)

## 2020-01-10 NOTE — PROGRESS NOTES
[facility] Hospital Medicine  Progress Note    Patient Name: Kiko Gruber  MRN: [id number]  Patient Class: IP- Inpatient   Admission Date: 1/6/2020  Length of Stay: 3 days  Attending Physician: Michelle Ray MD  Primary Care Provider: Lala Juarez MD    [facility] Medicine Team: [facility]OSP MED D Michelle Ray MD    HPI:   54 y.o. Male with metastatic prostate cancer on chemo, s/p ex lap with small bowel resection and end ileostomy placement with ureterneocystostomy by Urology on 12/22/19 admitted for hyponatremia, hypomagnesemia and JORGE ALBERTO due to high ostomy output. Evaluated by Gen Surg and Urology. On Loperamide and Lomotil. Hyponatremia and JORGE ALBERTO improving.     Subjective:     Principal Problem:Hyponatremia    Interval History:  Patient eager to go home. Colostomy output reduced.    Review of Systems   Constitutional: Negative for fever.   Respiratory: Negative for shortness of breath.      Objective:     Vital Signs (Most Recent):  Temp: 98.4 °F (36.9 °C) (01/09/20 1522)  Pulse: 84 (01/09/20 1522)  Resp: 16 (01/09/20 1522)  BP: 104/67 (01/09/20 1522)  SpO2: 99 % (01/09/20 1522) Vital Signs (24h Range):  Temp:  [96.7 °F (35.9 °C)-98.4 °F (36.9 °C)] 98.4 °F (36.9 °C)  Pulse:  [84-93] 84  Resp:  [16-19] 16  SpO2:  [98 %-100 %] 99 %  BP: ()/(62-78) 104/67     Weight: 78.8 kg (173 lb 11.6 oz)  Body mass index is 25.65 kg/m².    Intake/Output Summary (Last 24 hours) at 1/9/2020 1849  Last data filed at 1/9/2020 0630  Gross per 24 hour   Intake --   Output 1300 ml   Net -1300 ml      Physical Exam   Eyes: Conjunctivae and lids are normal.   Cardiovascular: S1 normal and S2 normal.   Pulmonary/Chest: Effort normal and breath sounds normal.   Abdominal: Soft. Bowel sounds are normal. There is no tenderness.   Ileostomy, colostomy   Musculoskeletal: He exhibits no edema.   Neurological: He is alert. He is not disoriented.   Skin: No cyanosis. Nails show no clubbing.   Psychiatric: His  mood appears anxious.       Significant Labs:     CBC:   Recent Labs   Lab 01/08/20  0350 01/09/20  0648   WBC 11.19 9.92   HGB 12.1* 11.1*   HCT 36.2* 31.8*   * 481*     CMP:   Recent Labs   Lab 01/08/20  0350 01/08/20  1745 01/09/20  1030   * 123* 124*   K 5.4* 4.9 4.6   CL 93* 89* 92*   CO2 16* 22* 22*   GLU 94 122* 103   BUN 45* 46* 46*   CREATININE 2.6* 2.2* 1.9*   CALCIUM 9.9 9.1 10.1   PROT 8.4 7.4 7.3   ALBUMIN 3.5 3.0* 3.1*   BILITOT 0.2 0.2 0.2   ALKPHOS 119 121 123   AST 18 32 25   ALT 38 42 47*   ANIONGAP 13 12 10   EGFRNONAA 26.6* 32.5* 38.8*     Magnesium:   Recent Labs   Lab 01/08/20  0350 01/09/20  0648   MG 1.9 1.2*     Significant Imaging: .    Assessment/Plan:      Active Diagnoses:    Diagnosis Date Noted POA    PRINCIPAL PROBLEM:  Hyponatremia [E87.1] 12/30/2019 Yes    JORGE ALBERTO (acute kidney injury) [N17.9] 01/06/2020 Yes    Prostate cancer [C61] 01/06/2020 Yes    Nicotine dependence [F17.200] 12/30/2019 Yes    Hypomagnesemia [E83.42] 12/30/2019 Yes    HTN (hypertension) [I10] 12/30/2019 Yes      Problems Resolved During this Admission:     Overview / Hospital Course:   Sodium over-corrected initially and started on D5 drip for 24 hours. Na improved to122. He has been started on Loperamide and Lomotil. Electrolyte derangement likely due to high ostomy output, goal of </= 1L ostomy output/day.     Inpatient Medications prescribed for management of Current Problems:   Scheduled Meds:    diphenoxylate-atropine 2.5-0.025 mg  2 tablet Oral BID    enoxaparin  40 mg Subcutaneous Daily    loperamide  4 mg Oral QID    magnesium oxide  400 mg Oral BID    magnesium sulfate IVPB  2 g Intravenous Q2H    nicotine  1 patch Transdermal Daily    pantoprazole  40 mg Oral Daily    predniSONE  10 mg Oral Daily    scopolamine  1 patch Transdermal Q3 Days     Continuous Infusions:    sodium chloride 0.9% 50 mL/hr at 01/09/20 9761     As Needed: miconazole NITRATE 2 %, oxyCODONE, sodium  chloride 0.9%    Assessment and Plan by Problem    HTN (hypertension)  Home medications: Lisinopril-HCTZ 10/12.5  Home meds held in setting of lower BP, resume when appropriate  -- Monitor BP     Hyponatremia  Volume depletion  Patient presented to OSH with Na of 111, found to be hypovolemic received 3L of IVF.   Currently asymptomatic   Sodium went up to 119. Na stable at 122 - 124.   -- monitor closely   -- resumed gentle hydration with NS  Ileostomy output decreased over past 24h. Continue loperamide 4 mg QID.  Lomotil increased to 2 tablets BID. Goal is ileostomy output < 1 L over 24h to prevent dehydration.      JORGE ALBERTO (acute kidney injury)  BUN/ sCr  - 82/4.73  from a baseline Cr of ~ 1. Likely from prerenal in setting of dehydration from poor po intake and increased ostomy ouput. No hydronephrosis seen on Renal US.   -- 1L LR bolus  -- Bicarb drip @ 200 cc/hr   -- Monitor RFT  -- Strict I/O  -- Avoid Nephrotoxic drugs  -- Renally dose Medications     Hypomagnesemia  Monitor Daily and replace as appropriate.      Prostate cancer  Patient with metastatic prostate cancer on chemo, received last 12/19 and missed next dose since was in hospital for SBO, underwent ex lap with small bowel resection with ileostomy creation and ureterneocystostomy by Urology.   Patient's care is completely in [facility]. Takes Prednisone 5mg BID.   -- Prednisone 10 mg Daily     Nicotine dependence  Patient is a smoker 2-3 ppd x 28 years, since last 1 week reduced to 1 ppd   -- Nicotine patch    Diet Adult Regular (IDDSI Level 7) [facility]    Significant LDAs:     HIGH RISK CONDITION(S):   Patient has a condition that poses threat to life and bodily function: Acute Renal Failure     Discharge plan and follow up  Home or Self Care    VTE Risk Mitigation (From admission, onward)         Ordered     enoxaparin injection 40 mg  Daily      01/07/20 1056     IP VTE HIGH RISK PATIENT  Once      01/06/20 5359                 Michelle Ray MD  Department of Hospital Medicine   [facility]

## 2020-01-11 VITALS
TEMPERATURE: 98 F | BODY MASS INDEX: 25.73 KG/M2 | DIASTOLIC BLOOD PRESSURE: 82 MMHG | HEIGHT: 69 IN | RESPIRATION RATE: 20 BRPM | WEIGHT: 173.75 LBS | SYSTOLIC BLOOD PRESSURE: 119 MMHG | OXYGEN SATURATION: 97 % | HEART RATE: 89 BPM

## 2020-01-11 PROBLEM — E87.1 HYPONATREMIA: Status: RESOLVED | Noted: 2019-12-30 | Resolved: 2020-01-11

## 2020-01-11 PROBLEM — N17.9 AKI (ACUTE KIDNEY INJURY): Status: RESOLVED | Noted: 2020-01-06 | Resolved: 2020-01-11

## 2020-01-11 LAB
ALBUMIN SERPL BCP-MCNC: 2.9 G/DL (ref 3.5–5.2)
ALP SERPL-CCNC: 113 U/L (ref 55–135)
ALT SERPL W/O P-5'-P-CCNC: 45 U/L (ref 10–44)
ANION GAP SERPL CALC-SCNC: 9 MMOL/L (ref 8–16)
AST SERPL-CCNC: 18 U/L (ref 10–40)
BASOPHILS # BLD AUTO: 0.11 K/UL (ref 0–0.2)
BASOPHILS NFR BLD: 1.3 % (ref 0–1.9)
BILIRUB SERPL-MCNC: <0.1 MG/DL (ref 0.1–1)
BUN SERPL-MCNC: 36 MG/DL (ref 6–20)
CALCIUM SERPL-MCNC: 9.8 MG/DL (ref 8.7–10.5)
CHLORIDE SERPL-SCNC: 101 MMOL/L (ref 95–110)
CO2 SERPL-SCNC: 21 MMOL/L (ref 23–29)
CREAT SERPL-MCNC: 1.3 MG/DL (ref 0.5–1.4)
DIFFERENTIAL METHOD: ABNORMAL
EOSINOPHIL # BLD AUTO: 0.2 K/UL (ref 0–0.5)
EOSINOPHIL NFR BLD: 2.9 % (ref 0–8)
ERYTHROCYTE [DISTWIDTH] IN BLOOD BY AUTOMATED COUNT: 13.8 % (ref 11.5–14.5)
EST. GFR  (AFRICAN AMERICAN): >60 ML/MIN/1.73 M^2
EST. GFR  (NON AFRICAN AMERICAN): >60 ML/MIN/1.73 M^2
GLUCOSE SERPL-MCNC: 94 MG/DL (ref 70–110)
HCT VFR BLD AUTO: 30.9 % (ref 40–54)
HGB BLD-MCNC: 10.1 G/DL (ref 14–18)
IMM GRANULOCYTES # BLD AUTO: 0.17 K/UL (ref 0–0.04)
IMM GRANULOCYTES NFR BLD AUTO: 2 % (ref 0–0.5)
LYMPHOCYTES # BLD AUTO: 1.7 K/UL (ref 1–4.8)
LYMPHOCYTES NFR BLD: 20.7 % (ref 18–48)
MAGNESIUM SERPL-MCNC: 1.5 MG/DL (ref 1.6–2.6)
MCH RBC QN AUTO: 29.4 PG (ref 27–31)
MCHC RBC AUTO-ENTMCNC: 32.7 G/DL (ref 32–36)
MCV RBC AUTO: 90 FL (ref 82–98)
MONOCYTES # BLD AUTO: 0.7 K/UL (ref 0.3–1)
MONOCYTES NFR BLD: 8.5 % (ref 4–15)
NEUTROPHILS # BLD AUTO: 5.4 K/UL (ref 1.8–7.7)
NEUTROPHILS NFR BLD: 64.6 % (ref 38–73)
NRBC BLD-RTO: 0 /100 WBC
PHOSPHATE SERPL-MCNC: 4.1 MG/DL (ref 2.7–4.5)
PLATELET # BLD AUTO: 466 K/UL (ref 150–350)
PMV BLD AUTO: 9 FL (ref 9.2–12.9)
POTASSIUM SERPL-SCNC: 4.6 MMOL/L (ref 3.5–5.1)
PROT SERPL-MCNC: 6.9 G/DL (ref 6–8.4)
RBC # BLD AUTO: 3.43 M/UL (ref 4.6–6.2)
SODIUM SERPL-SCNC: 131 MMOL/L (ref 136–145)
WBC # BLD AUTO: 8.37 K/UL (ref 3.9–12.7)

## 2020-01-11 PROCEDURE — 25000003 PHARM REV CODE 250: Performed by: STUDENT IN AN ORGANIZED HEALTH CARE EDUCATION/TRAINING PROGRAM

## 2020-01-11 PROCEDURE — 80053 COMPREHEN METABOLIC PANEL: CPT

## 2020-01-11 PROCEDURE — 83735 ASSAY OF MAGNESIUM: CPT

## 2020-01-11 PROCEDURE — S4991 NICOTINE PATCH NONLEGEND: HCPCS | Performed by: STUDENT IN AN ORGANIZED HEALTH CARE EDUCATION/TRAINING PROGRAM

## 2020-01-11 PROCEDURE — 63600175 PHARM REV CODE 636 W HCPCS: Performed by: INTERNAL MEDICINE

## 2020-01-11 PROCEDURE — 84100 ASSAY OF PHOSPHORUS: CPT

## 2020-01-11 PROCEDURE — 94761 N-INVAS EAR/PLS OXIMETRY MLT: CPT

## 2020-01-11 PROCEDURE — 63600175 PHARM REV CODE 636 W HCPCS: Performed by: STUDENT IN AN ORGANIZED HEALTH CARE EDUCATION/TRAINING PROGRAM

## 2020-01-11 PROCEDURE — 25000003 PHARM REV CODE 250: Performed by: INTERNAL MEDICINE

## 2020-01-11 PROCEDURE — 99239 HOSP IP/OBS DSCHRG MGMT >30: CPT | Mod: ,,, | Performed by: INTERNAL MEDICINE

## 2020-01-11 PROCEDURE — 99239 PR HOSPITAL DISCHARGE DAY,>30 MIN: ICD-10-PCS | Mod: ,,, | Performed by: INTERNAL MEDICINE

## 2020-01-11 PROCEDURE — 85025 COMPLETE CBC W/AUTO DIFF WBC: CPT

## 2020-01-11 RX ORDER — MAGNESIUM SULFATE HEPTAHYDRATE 40 MG/ML
2 INJECTION, SOLUTION INTRAVENOUS
Status: COMPLETED | OUTPATIENT
Start: 2020-01-11 | End: 2020-01-11

## 2020-01-11 RX ORDER — LISINOPRIL AND HYDROCHLOROTHIAZIDE 10; 12.5 MG/1; MG/1
1 TABLET ORAL DAILY
Status: ON HOLD | COMMUNITY
Start: 2019-11-22 | End: 2020-01-11 | Stop reason: HOSPADM

## 2020-01-11 RX ORDER — HEPARIN 100 UNIT/ML
5 SYRINGE INTRAVENOUS ONCE
Status: DISCONTINUED | OUTPATIENT
Start: 2020-01-11 | End: 2020-01-11

## 2020-01-11 RX ORDER — HEPARIN 100 UNIT/ML
300 SYRINGE INTRAVENOUS ONCE
Status: DISCONTINUED | OUTPATIENT
Start: 2020-01-11 | End: 2020-01-11 | Stop reason: HOSPADM

## 2020-01-11 RX ORDER — PREDNISONE 5 MG/1
5 TABLET ORAL 2 TIMES DAILY
COMMUNITY
Start: 2019-12-16

## 2020-01-11 RX ORDER — DIPHENOXYLATE HYDROCHLORIDE AND ATROPINE SULFATE 2.5; .025 MG/1; MG/1
2 TABLET ORAL 2 TIMES DAILY
Qty: 120 TABLET | Refills: 1 | Status: SHIPPED | OUTPATIENT
Start: 2020-01-11

## 2020-01-11 RX ORDER — DIPHENOXYLATE HYDROCHLORIDE AND ATROPINE SULFATE 2.5; .025 MG/1; MG/1
2 TABLET ORAL 2 TIMES DAILY
Qty: 120 TABLET | Refills: 1 | Status: SHIPPED | OUTPATIENT
Start: 2020-01-11 | End: 2020-01-11

## 2020-01-11 RX ORDER — HYDROCODONE BITARTRATE AND ACETAMINOPHEN 10; 325 MG/1; MG/1
TABLET ORAL
COMMUNITY
Start: 2019-12-09

## 2020-01-11 RX ORDER — HEPARIN 100 UNIT/ML
100 SYRINGE INTRAVENOUS ONCE
Status: DISCONTINUED | OUTPATIENT
Start: 2020-01-11 | End: 2020-01-11

## 2020-01-11 RX ORDER — PREDNISONE 5 MG/1
10 TABLET ORAL 2 TIMES DAILY
Status: CANCELLED
Start: 2020-01-11

## 2020-01-11 RX ORDER — SCOLOPAMINE TRANSDERMAL SYSTEM 1 MG/1
1 PATCH, EXTENDED RELEASE TRANSDERMAL
Qty: 10 PATCH | Refills: 1 | Status: SHIPPED | OUTPATIENT
Start: 2020-01-11

## 2020-01-11 RX ORDER — MICONAZOLE NITRATE 2 %
POWDER (GRAM) TOPICAL
Qty: 85 G | Refills: 1 | Status: SHIPPED | OUTPATIENT
Start: 2020-01-11

## 2020-01-11 RX ORDER — PANTOPRAZOLE SODIUM 40 MG/1
40 TABLET, DELAYED RELEASE ORAL DAILY
Qty: 30 TABLET | Refills: 11 | Status: CANCELLED | OUTPATIENT
Start: 2020-01-12 | End: 2021-01-11

## 2020-01-11 RX ORDER — LOPERAMIDE HYDROCHLORIDE 2 MG/1
4 CAPSULE ORAL 4 TIMES DAILY
Qty: 240 CAPSULE | Refills: 1 | Status: SHIPPED | OUTPATIENT
Start: 2020-01-11

## 2020-01-11 RX ORDER — OMEPRAZOLE 40 MG/1
40 CAPSULE, DELAYED RELEASE ORAL DAILY
COMMUNITY
Start: 2019-10-25

## 2020-01-11 RX ADMIN — MAGNESIUM SULFATE IN WATER 2 G: 40 INJECTION, SOLUTION INTRAVENOUS at 10:01

## 2020-01-11 RX ADMIN — PANTOPRAZOLE SODIUM 40 MG: 40 TABLET, DELAYED RELEASE ORAL at 09:01

## 2020-01-11 RX ADMIN — DIPHENOXYLATE HYDROCHLORIDE AND ATROPINE SULFATE 2 TABLET: 2.5; .025 TABLET ORAL at 09:01

## 2020-01-11 RX ADMIN — LOPERAMIDE HYDROCHLORIDE 4 MG: 2 CAPSULE ORAL at 09:01

## 2020-01-11 RX ADMIN — LOPERAMIDE HYDROCHLORIDE 4 MG: 2 CAPSULE ORAL at 01:01

## 2020-01-11 RX ADMIN — PREDNISONE 10 MG: 10 TABLET ORAL at 09:01

## 2020-01-11 RX ADMIN — MAGNESIUM SULFATE IN WATER 2 G: 40 INJECTION, SOLUTION INTRAVENOUS at 09:01

## 2020-01-11 RX ADMIN — Medication 1 PATCH: at 09:01

## 2020-01-11 NOTE — PLAN OF CARE
Very happy about going home. No c/o pain, N/V. Sodium improving. Safety maintained during stay. Agrees to follow outpatient discharge recommendations.

## 2020-01-11 NOTE — DISCHARGE INSTRUCTIONS
Continue loperamide 4 mg QID and Lomotil 2 tablets BID.     Goal is ileostomy output < 1 L over 24 hours to prevent dehydration.     OSTOMY CARE:  General Instructions:  - Instruct patient/caregiver to empty bag when full and PRN.   - Change and clean site every 48 hours.  - Monitor skin integrity.  - Monitor the appearance of the stoma. The tissue should be moist and pink or red in color.  - Increased risk of dehydration for patients with an ileostomy. Recommended daily fluid intake is 8 to 10 eight-ounce drinks or enough to match ostomy losses.  - Encourage the patient to monitor bowel function every day by recording outputs.      Bridger LOPEZ if:  - Purple, black, or white stoma   - Severe cramps lasting more than 6 hours  - Severe watery discharge from the stoma that does not resovle  - No output or markedly decreased output from the ostomy for an extended period of time  - Excessive bleeding from stoma  - Swelling of stoma to more than 1/2-inch larger than usual  - Inward retraction of stoma to below skin level  - Severe skin irritation or deep ulcers  - Bulging or other changes in the abdomen     Notify ostomy care nurse if:  - Frequent leaking of pouching system  - Change in size or appearance of stoma, causing discomfort or problems with pouch  - Skin rash or rawness  - Weight gain or loss that causes problems with pouch

## 2020-01-11 NOTE — DISCHARGE SUMMARY
[facility] Hospital Medicine  Discharge Summary      Patient Name: Kiko Gruber  MRN: [id number]  Admission Date: 1/6/2020  Hospital Length of Stay: 5 days  Discharge Date and Time: 1/11/2020  4:03 PM  Attending Physician: Michelle Ray MD   Discharging Provider: Michelle Ray MD  Primary Care Provider: Lala Juarez MD    Hospital Medicine Team: [facility] Michelle Ray MD    HPI: *54 y.o. Male with metastatic prostate cancer on chemo, s/p ex lap with small bowel resection and end ileostomy placement with ureterneocystostomy by Urology on 12/22/19 admitted for hyponatremia, hypomagnesemia and JORGE ALBERTO due to high ostomy output. Evaluated by Gen Surg and Urology. On Loperamide and Lomotil. Hyponatremia and JORGE ALBERTO improving.     * No surgery found *      Hospital Course:  Sodium over-corrected initially and started on D5 drip for 24 hours. Na improved to122. He has been started on Loperamide and Lomotil. Electrolyte derangement likely due to high ostomy output, goal of </= 1L ostomy output/day.     HTN (hypertension)  Home medications: Lisinopril-HCTZ 10/12.5  Home meds held in setting of lower BP  Discontinued lisinopril and HCTZ due to hyperkalemia, hyponatremia, and JORGE ALBERTO     Hyponatremia  Volume depletion  Patient presented to OSH with Na of 111, found to be hypovolemic received 3L of IVF.   Currently asymptomatic   Sodium went up to 119. Na stable at 122 - 124.   -- monitor closely   -- resumed gentle hydration with NS  Ileostomy output decreased over past 24h. Continue loperamide 4 mg QID.  Lomotil increased to 2 tablets BID. Goal is ileostomy output < 1 L over 24h to prevent dehydration.   Continued current management with NS     JORGE ALBERTO (acute kidney injury)  BUN/ sCr  - 82/4.73  from a baseline Cr of ~ 1. Likely from prerenal in setting of dehydration from poor po intake and increased ostomy ouput. No hydronephrosis seen on Renal US.   -- 1L LR bolus  -- Bicarb drip @ 200  cc/hr   -- Monitor RFT  -- Strict I/O  -- Avoid Nephrotoxic drugs  -- Renally dose Medications  -Improved     Hypomagnesemia  Monitor Daily and replace as appropriate.      Prostate cancer  Patient with metastatic prostate cancer on chemo, received last 12/19 and missed next dose since was in hospital for SBO, underwent ex lap with small bowel resection with ileostomy creation and ureterneocystostomy by Urology.   Patient's care is completely in [facility]. Takes Prednisone 5mg BID.   -- Prednisone 10 mg Daily     Nicotine dependence  Patient is a smoker 2-3 ppd x 28 years, since last 1 week reduced to 1 ppd   -- Nicotine patch       Consults:   Consults (From admission, onward)        Status Ordering Provider     Inpatient consult to General Surgery  Once     Provider:  (Not yet assigned)    Completed ALANNAH CHARLES     Inpatient consult to Registered Dietitian/Nutritionist  Once     Provider:  (Not yet assigned)    Completed MIKHAIL MCCOLLUM     Inpatient consult to Urology  Once     Provider:  (Not yet assigned)    Completed ALANNAH CHARLES          Final Active Diagnoses:    Diagnosis Date Noted POA    Prostate cancer [C61] 01/06/2020 Yes    Nicotine dependence [F17.200] 12/30/2019 Yes    Hypomagnesemia [E83.42] 12/30/2019 Yes    HTN (hypertension) [I10] 12/30/2019 Yes      Problems Resolved During this Admission:    Diagnosis Date Noted Date Resolved POA    PRINCIPAL PROBLEM:  Hyponatremia [E87.1] 12/30/2019 01/11/2020 Yes    JORGE ALBERTO (acute kidney injury) [N17.9] 01/06/2020 01/11/2020 Yes      Discharged Condition: stable    Disposition: Home or Self Care    Follow Up:  Follow-up Information     [facility] On 1/17/2020.    Why:  Urology follow-up for red-rubber catheter removal from ileal conduit.  Contact information:  [address] [place] [address]           Go to Ck Herron MD.    Specialty:  General Surgery  Why:  As previously planned, As Scheduled  Contact  information:  [address]  [facility] [address]  [phone]             Go to ANNETTE Narvaez.    Specialties:  Colon and Rectal Surgery, Wound Care  Why:  As previously planned, As Scheduled  Contact information:  [address]  [facility] [address]  [phone]                 Future Appointments   Date Time Provider Department Center   1/13/2020  9:30 AM ANNETTE Narvaez [facility] ENTERO [facility]   1/13/2020  2:15 PM Ck Herron MD [facility] GENSUR [facility]   1/17/2020  2:00 PM [facility] UROLOGY PHYSICIAN CLINIC [facility] UROLOGY [facility]     Patient Instructions:      Diet Adult Regular     Notify your health care provider if you experience any of the following:  temperature >100.4     Notify your health care provider if you experience any of the following:  persistent nausea and vomiting or diarrhea     Notify your health care provider if you experience any of the following:  redness, tenderness, or signs of infection (pain, swelling, redness, odor or green/yellow discharge around incision site)     Notify your health care provider if you experience any of the following:  difficulty breathing or increased cough     Notify your health care provider if you experience any of the following:  persistent dizziness, light-headedness, or visual disturbances     Notify your health care provider if you experience any of the following:  increased confusion or weakness     No dressing needed     Activity as tolerated     Medications:  Reconciled Home Medications:      Medication List      START taking these medications    diphenoxylate-atropine 2.5-0.025 mg 2.5-0.025 mg per tablet  Commonly known as:  LOMOTIL  Take 2 tablets by mouth 2 (two) times daily.     loperamide 2 mg capsule  Commonly known as:  IMODIUM  Take 2 capsules (4 mg total) by mouth 4 (four) times daily.  Replaces:  loperamide 1 mg/7.5 mL solution     miconazole NITRATE 2 % 2 % top powder  Commonly known as:  MICOTIN  Apply topically as needed (for  peristomal excoriation).        CONTINUE taking these medications    HYDROcodone-acetaminophen  mg per tablet  Commonly known as:  NORCO  TK 1 T PO  Q 6 H PRN P     nicotine 14 mg/24 hr  Commonly known as:  NICODERM CQ  Place 1 patch onto the skin once daily. for 14 days     omeprazole 40 MG capsule  Commonly known as:  PRILOSEC  Take 40 mg by mouth once daily.     oxyCODONE 5 MG immediate release tablet  Commonly known as:  ROXICODONE  Take 1 tablet (5 mg total) by mouth every 4 (four) hours as needed.     predniSONE 5 MG tablet  Commonly known as:  DELTASONE  Take 5 mg by mouth 2 (two) times daily.     scopolamine 1.3-1.5 mg (1 mg over 3 days)  Commonly known as:  TRANSDERM-SCOP  Place 1 patch onto the skin Every 3 (three) days.        STOP taking these medications    lisinopril-hydrochlorothiazide 10-12.5 mg per tablet  Commonly known as:  PRINZIDE,ZESTORETIC     loperamide 1 mg/7.5 mL solution  Commonly known as:  IMODIUM  Replaced by:  loperamide 2 mg capsule            Significant Diagnostic Studies: Labs:   CMP   Recent Labs   Lab 01/10/20  0510 01/10/20  1734 01/11/20  0441   * 129* 131*   K 4.8 5.2* 4.6   CL 95 100 101   CO2 21* 21* 21*   GLU 94 107 94   BUN 45* 38* 36*   CREATININE 1.4 1.4 1.3   CALCIUM 9.6 9.0 9.8   PROT 7.2  --  6.9   ALBUMIN 3.1*  --  2.9*   BILITOT 0.2  --  <0.1*   ALKPHOS 115  --  113   AST 26  --  18   ALT 53*  --  45*   ANIONGAP 9 8 9   ESTGFRAFRICA >60.0 >60.0 >60.0   EGFRNONAA 56.2* 56.2* >60.0   , CBC   Recent Labs   Lab 01/10/20  0510 01/11/20  0441   WBC 10.35 8.37   HGB 10.7* 10.1*   HCT 31.8* 30.9*   * 466*     Results for orders placed or performed during the hospital encounter of 01/06/20   X-Ray Abdomen AP 1 View    Narrative    EXAMINATION:  XR ABDOMEN AP 1 VIEW    CLINICAL HISTORY:  Assess L ureteral stent positioning;    TECHNIQUE:  AP View(s) of the abdomen was performed.    COMPARISON:  12/30/2019    FINDINGS:  Single-view abdomen supine.    Left  renal stent has been placed, in this patient status post cystectomy.  Surgical change overlie the abdomen.  Prominent bowel loops are noted.  There may be associated free air, correlation with timing of surgery recommended.  Upright imaging could confirm the presence of free air.      Impression    As above      Electronically signed by: Stevan Goddard MD  Date:    01/07/2020  Time:    13:41     Indwelling Lines/Drains at time of discharge:   Lines/Drains/Airways     Central Venous Catheter Line                 Port A Cath Single Lumen -- days          Drain                 Ileostomy 12/22/19 1704 Standard (Ewelina, end) LUQ 19 days         Urostomy 12/22/19 1654 ileal conduit RUQ 19 days                Time spent on the discharge of patient: 50 minutes  Patient was seen and examined on the date of discharge and determined to be suitable for discharge.         Michelle Ray MD  Department of Hospital Medicine  [facility]

## 2020-01-11 NOTE — PLAN OF CARE
Continues on IV fluids,ileostomy and urostomy intact, no distress noted, will continue to monitor.

## 2020-01-13 ENCOUNTER — OFFICE VISIT (OUTPATIENT)
Dept: SURGERY | Facility: CLINIC | Age: 55
DRG: 872 | End: 2020-01-13
Payer: MEDICAID

## 2020-01-13 ENCOUNTER — OFFICE VISIT (OUTPATIENT)
Dept: WOUND CARE | Facility: CLINIC | Age: 55
DRG: 872 | End: 2020-01-13
Payer: MEDICAID

## 2020-01-13 ENCOUNTER — HOSPITAL ENCOUNTER (INPATIENT)
Facility: HOSPITAL | Age: 55
LOS: 3 days | Discharge: HOME | DRG: 872 | End: 2020-01-16
Attending: EMERGENCY MEDICINE | Admitting: EMERGENCY MEDICINE
Payer: MEDICAID

## 2020-01-13 VITALS
WEIGHT: 159.31 LBS | TEMPERATURE: 99 F | HEIGHT: 69 IN | BODY MASS INDEX: 23.6 KG/M2 | DIASTOLIC BLOOD PRESSURE: 60 MMHG | SYSTOLIC BLOOD PRESSURE: 94 MMHG | HEART RATE: 116 BPM

## 2020-01-13 VITALS — SYSTOLIC BLOOD PRESSURE: 92 MMHG | HEART RATE: 109 BPM | DIASTOLIC BLOOD PRESSURE: 61 MMHG

## 2020-01-13 DIAGNOSIS — K56.609 SBO (SMALL BOWEL OBSTRUCTION): Primary | ICD-10-CM

## 2020-01-13 DIAGNOSIS — Z43.2 ATTENTION TO ILEOSTOMY: Primary | ICD-10-CM

## 2020-01-13 DIAGNOSIS — Z71.89 ENCOUNTER FOR OSTOMY CARE EDUCATION: ICD-10-CM

## 2020-01-13 DIAGNOSIS — R50.9 FEBRILE ILLNESS, ACUTE: ICD-10-CM

## 2020-01-13 DIAGNOSIS — N10 PYELONEPHRITIS, ACUTE: Primary | ICD-10-CM

## 2020-01-13 DIAGNOSIS — Z43.6 ATTENTION TO UROSTOMY: ICD-10-CM

## 2020-01-13 DIAGNOSIS — R00.0 TACHYCARDIA: ICD-10-CM

## 2020-01-13 DIAGNOSIS — N12 PYELONEPHRITIS: ICD-10-CM

## 2020-01-13 PROBLEM — A41.9 SEPSIS: Status: ACTIVE | Noted: 2020-01-13

## 2020-01-13 LAB
ABO + RH BLD: NORMAL
ALBUMIN SERPL BCP-MCNC: 3.2 G/DL (ref 3.5–5.2)
ALP SERPL-CCNC: 131 U/L (ref 55–135)
ALT SERPL W/O P-5'-P-CCNC: 40 U/L (ref 10–44)
ANION GAP SERPL CALC-SCNC: 11 MMOL/L (ref 8–16)
APTT BLDCRRT: 30.1 SEC (ref 21–32)
AST SERPL-CCNC: 15 U/L (ref 10–40)
BACTERIA #/AREA URNS AUTO: ABNORMAL /HPF
BASOPHILS # BLD AUTO: 0.11 K/UL (ref 0–0.2)
BASOPHILS NFR BLD: 0.6 % (ref 0–1.9)
BILIRUB SERPL-MCNC: 0.4 MG/DL (ref 0.1–1)
BILIRUB UR QL STRIP: NEGATIVE
BLD GP AB SCN CELLS X3 SERPL QL: NORMAL
BUN SERPL-MCNC: 31 MG/DL (ref 6–20)
CALCIUM SERPL-MCNC: 10.1 MG/DL (ref 8.7–10.5)
CHLORIDE SERPL-SCNC: 91 MMOL/L (ref 95–110)
CLARITY UR REFRACT.AUTO: ABNORMAL
CO2 SERPL-SCNC: 19 MMOL/L (ref 23–29)
COLOR UR AUTO: YELLOW
CREAT SERPL-MCNC: 1.9 MG/DL (ref 0.5–1.4)
DIFFERENTIAL METHOD: ABNORMAL
EOSINOPHIL # BLD AUTO: 0 K/UL (ref 0–0.5)
EOSINOPHIL NFR BLD: 0.2 % (ref 0–8)
ERYTHROCYTE [DISTWIDTH] IN BLOOD BY AUTOMATED COUNT: 13.9 % (ref 11.5–14.5)
EST. GFR  (AFRICAN AMERICAN): 44.9 ML/MIN/1.73 M^2
EST. GFR  (NON AFRICAN AMERICAN): 38.8 ML/MIN/1.73 M^2
GLUCOSE SERPL-MCNC: 89 MG/DL (ref 70–110)
GLUCOSE UR QL STRIP: NEGATIVE
HCT VFR BLD AUTO: 32.8 % (ref 40–54)
HGB BLD-MCNC: 10.8 G/DL (ref 14–18)
HGB UR QL STRIP: ABNORMAL
HYALINE CASTS UR QL AUTO: 0 /LPF
IMM GRANULOCYTES # BLD AUTO: 0.26 K/UL (ref 0–0.04)
IMM GRANULOCYTES NFR BLD AUTO: 1.4 % (ref 0–0.5)
INFLUENZA A, MOLECULAR: NEGATIVE
INFLUENZA B, MOLECULAR: NEGATIVE
INR PPP: 1.1 (ref 0.8–1.2)
KETONES UR QL STRIP: NEGATIVE
LACTATE SERPL-SCNC: 1.1 MMOL/L (ref 0.5–2.2)
LACTATE SERPL-SCNC: 1.2 MMOL/L (ref 0.5–2.2)
LEUKOCYTE ESTERASE UR QL STRIP: ABNORMAL
LIPASE SERPL-CCNC: 31 U/L (ref 4–60)
LYMPHOCYTES # BLD AUTO: 0.7 K/UL (ref 1–4.8)
LYMPHOCYTES NFR BLD: 3.7 % (ref 18–48)
MAGNESIUM SERPL-MCNC: 1 MG/DL (ref 1.6–2.6)
MCH RBC QN AUTO: 29.4 PG (ref 27–31)
MCHC RBC AUTO-ENTMCNC: 32.9 G/DL (ref 32–36)
MCV RBC AUTO: 89 FL (ref 82–98)
MICROSCOPIC COMMENT: ABNORMAL
MONOCYTES # BLD AUTO: 1 K/UL (ref 0.3–1)
MONOCYTES NFR BLD: 5.4 % (ref 4–15)
NEUTROPHILS # BLD AUTO: 17 K/UL (ref 1.8–7.7)
NEUTROPHILS NFR BLD: 88.7 % (ref 38–73)
NITRITE UR QL STRIP: NEGATIVE
NRBC BLD-RTO: 0 /100 WBC
PH UR STRIP: 6 [PH] (ref 5–8)
PHOSPHATE SERPL-MCNC: 4.6 MG/DL (ref 2.7–4.5)
PLATELET # BLD AUTO: 364 K/UL (ref 150–350)
PMV BLD AUTO: 9.6 FL (ref 9.2–12.9)
POTASSIUM SERPL-SCNC: 5.5 MMOL/L (ref 3.5–5.1)
PROCALCITONIN SERPL IA-MCNC: 0.77 NG/ML
PROT SERPL-MCNC: 7.8 G/DL (ref 6–8.4)
PROT UR QL STRIP: ABNORMAL
PROTHROMBIN TIME: 11.3 SEC (ref 9–12.5)
RBC # BLD AUTO: 3.67 M/UL (ref 4.6–6.2)
RBC #/AREA URNS AUTO: 3 /HPF (ref 0–4)
SODIUM SERPL-SCNC: 121 MMOL/L (ref 136–145)
SP GR UR STRIP: 1 (ref 1–1.03)
SPECIMEN SOURCE: NORMAL
TROPONIN I SERPL DL<=0.01 NG/ML-MCNC: <0.006 NG/ML (ref 0–0.03)
URN SPEC COLLECT METH UR: ABNORMAL
WBC # BLD AUTO: 19.14 K/UL (ref 3.9–12.7)
WBC #/AREA URNS AUTO: 29 /HPF (ref 0–5)

## 2020-01-13 PROCEDURE — 99999 PR PBB SHADOW E&M-EST. PATIENT-LVL III: ICD-10-PCS | Mod: PBBFAC,,, | Performed by: SURGERY

## 2020-01-13 PROCEDURE — 25500020 PHARM REV CODE 255: Performed by: EMERGENCY MEDICINE

## 2020-01-13 PROCEDURE — 87040 BLOOD CULTURE FOR BACTERIA: CPT | Mod: 59

## 2020-01-13 PROCEDURE — 99291 CRITICAL CARE FIRST HOUR: CPT | Mod: 25

## 2020-01-13 PROCEDURE — 25000003 PHARM REV CODE 250: Performed by: INTERNAL MEDICINE

## 2020-01-13 PROCEDURE — 99999 PR PBB SHADOW E&M-EST. PATIENT-LVL III: CPT | Mod: PBBFAC,,, | Performed by: SURGERY

## 2020-01-13 PROCEDURE — 84484 ASSAY OF TROPONIN QUANT: CPT

## 2020-01-13 PROCEDURE — 93010 ELECTROCARDIOGRAM REPORT: CPT | Mod: ,,, | Performed by: INTERNAL MEDICINE

## 2020-01-13 PROCEDURE — 99223 1ST HOSP IP/OBS HIGH 75: CPT | Mod: ,,, | Performed by: INTERNAL MEDICINE

## 2020-01-13 PROCEDURE — 99202 OFFICE O/P NEW SF 15 MIN: CPT | Mod: S$PBB,,, | Performed by: CLINICAL NURSE SPECIALIST

## 2020-01-13 PROCEDURE — 87502 INFLUENZA DNA AMP PROBE: CPT

## 2020-01-13 PROCEDURE — 99024 PR POST-OP FOLLOW-UP VISIT: ICD-10-PCS | Mod: ,,, | Performed by: SURGERY

## 2020-01-13 PROCEDURE — 99223 PR INITIAL HOSPITAL CARE,LEVL III: ICD-10-PCS | Mod: ,,, | Performed by: INTERNAL MEDICINE

## 2020-01-13 PROCEDURE — 83690 ASSAY OF LIPASE: CPT

## 2020-01-13 PROCEDURE — 63600175 PHARM REV CODE 636 W HCPCS: Performed by: INTERNAL MEDICINE

## 2020-01-13 PROCEDURE — 99202 PR OFFICE/OUTPT VISIT, NEW, LEVL II, 15-29 MIN: ICD-10-PCS | Mod: S$PBB,,, | Performed by: CLINICAL NURSE SPECIALIST

## 2020-01-13 PROCEDURE — 99024 POSTOP FOLLOW-UP VISIT: CPT | Mod: ,,, | Performed by: SURGERY

## 2020-01-13 PROCEDURE — 84145 PROCALCITONIN (PCT): CPT

## 2020-01-13 PROCEDURE — 83735 ASSAY OF MAGNESIUM: CPT

## 2020-01-13 PROCEDURE — 96365 THER/PROPH/DIAG IV INF INIT: CPT

## 2020-01-13 PROCEDURE — 93010 EKG 12-LEAD: ICD-10-PCS | Mod: ,,, | Performed by: INTERNAL MEDICINE

## 2020-01-13 PROCEDURE — 99291 CRITICAL CARE FIRST HOUR: CPT | Mod: ,,, | Performed by: EMERGENCY MEDICINE

## 2020-01-13 PROCEDURE — 11000001 HC ACUTE MED/SURG PRIVATE ROOM

## 2020-01-13 PROCEDURE — 99291 PR CRITICAL CARE, E/M 30-74 MINUTES: ICD-10-PCS | Mod: ,,, | Performed by: EMERGENCY MEDICINE

## 2020-01-13 PROCEDURE — 99999 PR PBB SHADOW E&M-EST. PATIENT-LVL II: ICD-10-PCS | Mod: PBBFAC,,, | Performed by: CLINICAL NURSE SPECIALIST

## 2020-01-13 PROCEDURE — 85610 PROTHROMBIN TIME: CPT

## 2020-01-13 PROCEDURE — 96361 HYDRATE IV INFUSION ADD-ON: CPT

## 2020-01-13 PROCEDURE — 87086 URINE CULTURE/COLONY COUNT: CPT

## 2020-01-13 PROCEDURE — 84100 ASSAY OF PHOSPHORUS: CPT

## 2020-01-13 PROCEDURE — 86900 BLOOD TYPING SEROLOGIC ABO: CPT

## 2020-01-13 PROCEDURE — 80053 COMPREHEN METABOLIC PANEL: CPT

## 2020-01-13 PROCEDURE — 81001 URINALYSIS AUTO W/SCOPE: CPT

## 2020-01-13 PROCEDURE — 83605 ASSAY OF LACTIC ACID: CPT

## 2020-01-13 PROCEDURE — 99213 OFFICE O/P EST LOW 20 MIN: CPT | Mod: PBBFAC,25,27 | Performed by: SURGERY

## 2020-01-13 PROCEDURE — 99212 OFFICE O/P EST SF 10 MIN: CPT | Mod: PBBFAC,25 | Performed by: CLINICAL NURSE SPECIALIST

## 2020-01-13 PROCEDURE — 85730 THROMBOPLASTIN TIME PARTIAL: CPT

## 2020-01-13 PROCEDURE — 85025 COMPLETE CBC W/AUTO DIFF WBC: CPT

## 2020-01-13 PROCEDURE — 63600175 PHARM REV CODE 636 W HCPCS: Performed by: EMERGENCY MEDICINE

## 2020-01-13 PROCEDURE — 93005 ELECTROCARDIOGRAM TRACING: CPT

## 2020-01-13 PROCEDURE — 99999 PR PBB SHADOW E&M-EST. PATIENT-LVL II: CPT | Mod: PBBFAC,,, | Performed by: CLINICAL NURSE SPECIALIST

## 2020-01-13 RX ORDER — MAGNESIUM SULFATE HEPTAHYDRATE 40 MG/ML
2 INJECTION, SOLUTION INTRAVENOUS DAILY
Status: COMPLETED | OUTPATIENT
Start: 2020-01-14 | End: 2020-01-15

## 2020-01-13 RX ORDER — ENOXAPARIN SODIUM 100 MG/ML
40 INJECTION SUBCUTANEOUS EVERY 24 HOURS
Status: DISCONTINUED | OUTPATIENT
Start: 2020-01-13 | End: 2020-01-16 | Stop reason: HOSPADM

## 2020-01-13 RX ORDER — PREDNISONE 5 MG/1
5 TABLET ORAL 2 TIMES DAILY
Status: DISCONTINUED | OUTPATIENT
Start: 2020-01-13 | End: 2020-01-16 | Stop reason: HOSPADM

## 2020-01-13 RX ORDER — DIPHENOXYLATE HYDROCHLORIDE AND ATROPINE SULFATE 2.5; .025 MG/1; MG/1
2 TABLET ORAL 2 TIMES DAILY
Status: DISCONTINUED | OUTPATIENT
Start: 2020-01-13 | End: 2020-01-16 | Stop reason: HOSPADM

## 2020-01-13 RX ORDER — ACETAMINOPHEN 325 MG/1
650 TABLET ORAL EVERY 6 HOURS PRN
Status: DISCONTINUED | OUTPATIENT
Start: 2020-01-13 | End: 2020-01-16 | Stop reason: HOSPADM

## 2020-01-13 RX ORDER — MAGNESIUM SULFATE HEPTAHYDRATE 40 MG/ML
2 INJECTION, SOLUTION INTRAVENOUS
Status: COMPLETED | OUTPATIENT
Start: 2020-01-13 | End: 2020-01-14

## 2020-01-13 RX ORDER — MICONAZOLE NITRATE 2 %
POWDER (GRAM) TOPICAL
Status: DISCONTINUED | OUTPATIENT
Start: 2020-01-13 | End: 2020-01-16 | Stop reason: HOSPADM

## 2020-01-13 RX ORDER — SODIUM CHLORIDE 9 MG/ML
INJECTION, SOLUTION INTRAVENOUS CONTINUOUS
Status: DISCONTINUED | OUTPATIENT
Start: 2020-01-13 | End: 2020-01-16 | Stop reason: HOSPADM

## 2020-01-13 RX ORDER — PANTOPRAZOLE SODIUM 40 MG/1
40 TABLET, DELAYED RELEASE ORAL DAILY
Status: DISCONTINUED | OUTPATIENT
Start: 2020-01-14 | End: 2020-01-14

## 2020-01-13 RX ORDER — LOPERAMIDE HYDROCHLORIDE 2 MG/1
4 CAPSULE ORAL 4 TIMES DAILY
Status: DISCONTINUED | OUTPATIENT
Start: 2020-01-13 | End: 2020-01-14

## 2020-01-13 RX ORDER — SODIUM CHLORIDE 0.9 % (FLUSH) 0.9 %
10 SYRINGE (ML) INJECTION
Status: DISCONTINUED | OUTPATIENT
Start: 2020-01-13 | End: 2020-01-16 | Stop reason: HOSPADM

## 2020-01-13 RX ORDER — IBUPROFEN 200 MG
1 TABLET ORAL DAILY
Status: DISCONTINUED | OUTPATIENT
Start: 2020-01-14 | End: 2020-01-14

## 2020-01-13 RX ORDER — SCOLOPAMINE TRANSDERMAL SYSTEM 1 MG/1
1 PATCH, EXTENDED RELEASE TRANSDERMAL
Status: DISCONTINUED | OUTPATIENT
Start: 2020-01-13 | End: 2020-01-16 | Stop reason: HOSPADM

## 2020-01-13 RX ORDER — OXYCODONE HYDROCHLORIDE 5 MG/1
5 TABLET ORAL EVERY 4 HOURS PRN
Status: DISCONTINUED | OUTPATIENT
Start: 2020-01-13 | End: 2020-01-16 | Stop reason: HOSPADM

## 2020-01-13 RX ADMIN — MAGNESIUM SULFATE IN WATER 2 G: 40 INJECTION, SOLUTION INTRAVENOUS at 10:01

## 2020-01-13 RX ADMIN — SODIUM CHLORIDE: 0.9 INJECTION, SOLUTION INTRAVENOUS at 05:01

## 2020-01-13 RX ADMIN — PIPERACILLIN AND TAZOBACTAM 4.5 G: 4; .5 INJECTION, POWDER, FOR SOLUTION INTRAVENOUS at 04:01

## 2020-01-13 RX ADMIN — PREDNISONE 5 MG: 5 TABLET ORAL at 10:01

## 2020-01-13 RX ADMIN — SODIUM CHLORIDE 1200 ML: 0.9 INJECTION, SOLUTION INTRAVENOUS at 04:01

## 2020-01-13 RX ADMIN — SODIUM CHLORIDE 1000 ML: 0.9 INJECTION, SOLUTION INTRAVENOUS at 01:01

## 2020-01-13 RX ADMIN — LOPERAMIDE HYDROCHLORIDE 4 MG: 2 CAPSULE ORAL at 05:01

## 2020-01-13 RX ADMIN — ENOXAPARIN SODIUM 40 MG: 100 INJECTION SUBCUTANEOUS at 05:01

## 2020-01-13 RX ADMIN — ACETAMINOPHEN 650 MG: 325 TABLET ORAL at 08:01

## 2020-01-13 RX ADMIN — LOPERAMIDE HYDROCHLORIDE 4 MG: 2 CAPSULE ORAL at 10:01

## 2020-01-13 RX ADMIN — IOHEXOL 100 ML: 350 INJECTION, SOLUTION INTRAVENOUS at 03:01

## 2020-01-13 RX ADMIN — MAGNESIUM SULFATE IN WATER 2 G: 40 INJECTION, SOLUTION INTRAVENOUS at 07:01

## 2020-01-13 NOTE — PROGRESS NOTES
"Subjective:       Patient ID: Kiko Gruber is a 54 y.o. male.    Chief Complaint: Ileostomy and Urostomy    This is new pt to ostomy clinic, he has had urostomy for 4 years but due to emergency surgery on Clotilde carlos he now has an ileostomy as well . Is home but has been in and out post surgery, wife says had fever last night and not feeling great today, seeing Dr Herron after me,   PMHpresented with small bowel obstruction with CT findings concerning for internal hernia and closed loop obstruction. Based on this surgery was indicated.    Review of Systems   Constitutional: Positive for fever. Negative for appetite change.        Last night 102   HENT: Negative.    Respiratory: Negative.    Cardiovascular: Negative.    Gastrointestinal: Negative for abdominal pain.   Genitourinary: Negative for dysuria.   Skin: Positive for wound.       Objective:      Physical Exam   Constitutional: He appears well-developed.   Cardiovascular: Normal rate and regular rhythm.   Pulmonary/Chest: Effort normal and breath sounds normal.   Abdominal: Soft. Bowel sounds are normal. He exhibits no distension.   Musculoskeletal: Normal range of motion.   Skin: No erythema.         Left sided ileostomy is 25 mm budded , os at skin level and so showed them convex pouch as option, this will probably wear better for him, placed Domenic 1" soft convex , he wears Lamar on his urostomy, ( I did not remove that pouch as he sees urologist later in week and still has RRC sutured in place , )   He uses [employer] for DME and I told wife  I am happy to sign script for supplies   Assessment:       1. Attention to ileostomy    2. Attention to urostomy    3. Encounter for ostomy care education        Plan:       Suggested he try convexity and gave him some to take home  reviewed care of fecal stoma and differences from other stoma   To surgery clinic after me  I have reviewed the plan of care with the patient and/ or caregiver and they express " understanding. I spent over 50% of this 20 minute visit in face to face counseling.

## 2020-01-13 NOTE — ED NOTES
Patient identifiers verified and correct for Kiko Gruber.    LOC: The patient is awake, alert and aware of environment with an appropriate affect, the patient is oriented x 3 and speaking appropriately.  APPEARANCE: Patient resting comfortably and in no acute distress, patient is clean and well groomed, patient's clothing is properly fastened.  SKIN: The skin is warm and dry, color consistent with ethnicity, patient has normal skin turgor and moist mucus membranes, skin intact, no breakdown but generalized bruising noted.  MUSCULOSKELETAL: Patient moving all extremities spontaneously, no obvious swelling or deformities noted. +fatigue and malaise.   RESPIRATORY: Airway is open and patent, respirations are spontaneous, patient has a normal effort and rate, no accessory muscle use noted, bilateral breath sounds clear  CARDIAC: Patient tachycardic regular rhythm, no periphreal edema noted, capillary refill < 3 seconds.  ABDOMEN: Soft but tender to palpation, no distention noted, normoactive bowel sounds present in all four quadrants. Urostomy bag on the RLQ draining clear yellow. Colostomy bag on the LLQ budded but no stool currently in the bag.   NEUROLOGIC: PERRL, eyes open spontaneously, behavior appropriate to situation, follows commands.

## 2020-01-13 NOTE — PROGRESS NOTES
"Subjective:       Kiko Gruber is a 54 y.o M  3 weeks s/p ex lap with SBR with end ileostomy and ureteroneocystostomy creation on 12/22/19 who presents for post op evaluation. Denies any pain, nausea, vomiting.  He reports eating well with normal appetite and bowel function since the surgery up until yesterday morning. Reports decreased appetite, fevers, tachycardic since then. Today feels much better but still "tired" with no appetite.  Reports <1L ostomy output/day. Reports drinking >2L of pedialyte and gatorade per day to stay hydrated. Still taking imodium. Denies redness around or drainage from incisions.      Objective:      BP 94/60   Pulse (!) 116   Temp 98.6 °F (37 °C)   Ht 5' 9" (1.753 m)   Wt 72.3 kg (159 lb 4.5 oz)   BMI 23.52 kg/m²     GEN: He is oriented to person, place, and time. NAD. Appears flushed and fatigued.   Cardiovascular: tachycardic  Pulmonary/Chest: Effort normal. No respiratory distress.   Abdominal: Soft, nontender, nondistended. There is no rebound and no guarding. Ostomy in place. Ileostomy in place with normal urine output.   INCISION: Midline incision is clean, dry and intact. Staples in place, healing well without any signs of infection.     Labs:  Reviewed.      Imaging:  Reviewed.      Pathology:  1. Bowel, small bowel, resection:  -GANGRENOUS NECROSIS OF SMALL BOWEL WITH HEMORRHAGE  -ONE OF SURGICAL MARGINS PARTIALLY DEVITALIZED WITH HEMORRHAGE  -NEGATIVE FOR MALIGNANCY  2. Ureter, left distal ureter, excision:  -PARTIALLY DEVITALIZED URETER WITH MIXED INFLAMMATION AND HEMORRHAGE  -NEGATIVE FOR MALIGNANCY      Assessment:     3 weeks s/p ex lap with SBR with end ileostomy and ureteroneocystostomy creation on 12/22/19     Plan:     Staples were removed today 1/13/2020. Midline incision completely healed.  Patient was transported to the ED from clinic today for further assessment due to complaints of fever, tachycardia, and decreased appetite  "

## 2020-01-13 NOTE — ED TRIAGE NOTES
Kiko Gruber, a 54 y.o. male presents to the ED w/ complaint of fever and poor appetite.  Pt had SBO resection on the 12/27 and pt states that clinic sent him here.  Temp at 102.3.  Pt denies CP, SOB.   Pt arrived with urostomy tube on the RLQ and a colostomy in the LLQ.     Triage note:  Chief Complaint   Patient presents with    Fever     Pt reports fever, poor appetite.  Pt s/p bowel resection 12//27.      Review of patient's allergies indicates:  No Known Allergies  Past Medical History:   Diagnosis Date    Cancer     Prostate    Hypertension

## 2020-01-13 NOTE — ED PROVIDER NOTES
Encounter Date: 1/13/2020       History     Chief Complaint   Patient presents with    Fever     Pt reports fever, poor appetite.  Pt s/p bowel resection 12//27.      54-year-old male with a history of stage IV prostate cancer who has a chronic urostomy tube presents with acute febrile illness.  Yesterday he had a temperature up to 100.3.  Associated tachycardia and hypotension.  Patient was admitted at the end of December for small-bowel obstruction and had a resection.  He was sent home over the weekend.  He has had associated increasing fatigue and decreased oral intake.  He denies nausea, vomiting, diarrhea, cough, shortness of breath, or abdominal pain.  His urostomy equipment was changed yesterday.  A ten point review of systems was completed and is negative except as documented above.  Patient denies any other acute medical complaint.  The patients available PMH, PSH, Social History, medications, allergies, and triage vital signs were reviewed just prior to their medical evaluation.        Review of patient's allergies indicates:  No Known Allergies  Past Medical History:   Diagnosis Date    Cancer     Prostate    Hypertension      Past Surgical History:   Procedure Laterality Date    ABDOMINAL SURGERY      REIMPLANT URETER IN BLADDER Left 12/22/2019    Procedure: URETERONEOCYSTOSTOMY reimplant to conduit;  Surgeon: Manish Styles MD;  Location: [facility] OR [address] [place] [address];  Service: Urology;  Laterality: Left;     History reviewed. No pertinent family history.  Social History     Tobacco Use    Smoking status: Current Every Day Smoker     Packs/day: 1.00     Years: 18.00     Pack years: 18.00     Types: Cigarettes     Start date: 1991    Smokeless tobacco: Never Used   Substance Use Topics    Alcohol use: Not Currently     Alcohol/week: 70.0 standard drinks     Types: 70 Cans of beer per week     Frequency: 4 or more times a week     Drinks per session: 10 or more     Binge frequency: Monthly    Drug use:  Never     Review of Systems   Constitutional: Positive for appetite change, fatigue and fever.   HENT: Negative for sore throat.    Eyes: Negative for visual disturbance.   Respiratory: Negative for cough and shortness of breath.    Cardiovascular: Negative for chest pain.   Gastrointestinal: Negative for abdominal pain, diarrhea, nausea and vomiting.   Genitourinary: Negative for dysuria.   Musculoskeletal: Negative for neck pain.   Skin: Negative for rash and wound.   Allergic/Immunologic: Negative for immunocompromised state.   Neurological: Negative for syncope.   Psychiatric/Behavioral: Negative for confusion.       Physical Exam     Initial Vitals [01/13/20 1209]   BP Pulse Resp Temp SpO2   (!) 92/53 (!) 114 16 (!) 101.2 °F (38.4 °C) 98 %      MAP       --         Physical Exam    Nursing note and vitals reviewed.  Constitutional: He appears well-developed and well-nourished. He is not diaphoretic. No distress.   HENT:   Head: Normocephalic and atraumatic.   Nose: Nose normal.   Eyes: Conjunctivae are normal. Right eye exhibits no discharge. Left eye exhibits no discharge.   Neck: Normal range of motion. Neck supple.   Cardiovascular: Regular rhythm and normal heart sounds. Exam reveals no gallop and no friction rub.    No murmur heard.  tachycardia   Pulmonary/Chest: Breath sounds normal. No respiratory distress. He has no wheezes. He has no rhonchi. He has no rales.   Abdominal: Soft. He exhibits no distension. There is no tenderness. There is no rebound and no guarding.   Urostomy and ileostomy   Musculoskeletal: Normal range of motion. He exhibits no edema or tenderness.   Neurological: He is alert and oriented to person, place, and time. GCS score is 15. GCS eye subscore is 4. GCS verbal subscore is 5. GCS motor subscore is 6.   Skin: Skin is warm and dry. No rash noted. No erythema.   Psychiatric: He has a normal mood and affect. His behavior is normal. Judgment and thought content normal.         ED  Course   Procedures  Labs Reviewed   CBC W/ AUTO DIFFERENTIAL - Abnormal; Notable for the following components:       Result Value    WBC 19.14 (*)     RBC 3.67 (*)     Hemoglobin 10.8 (*)     Hematocrit 32.8 (*)     Platelets 364 (*)     Immature Granulocytes 1.4 (*)     Gran # (ANC) 17.0 (*)     Immature Grans (Abs) 0.26 (*)     Lymph # 0.7 (*)     Gran% 88.7 (*)     Lymph% 3.7 (*)     All other components within normal limits   COMPREHENSIVE METABOLIC PANEL - Abnormal; Notable for the following components:    Sodium 121 (*)     Potassium 5.5 (*)     Chloride 91 (*)     CO2 19 (*)     BUN, Bld 31 (*)     Creatinine 1.9 (*)     Albumin 3.2 (*)     eGFR if  44.9 (*)     eGFR if non  38.8 (*)     All other components within normal limits   URINALYSIS, REFLEX TO URINE CULTURE - Abnormal; Notable for the following components:    Appearance, UA Hazy (*)     Protein, UA 1+ (*)     Occult Blood UA 2+ (*)     Leukocytes, UA 2+ (*)     All other components within normal limits    Narrative:     Preferred Collection Type->Urine, Clean Catch   MAGNESIUM - Abnormal; Notable for the following components:    Magnesium 1.0 (*)     All other components within normal limits   PHOSPHORUS - Abnormal; Notable for the following components:    Phosphorus 4.6 (*)     All other components within normal limits   PROCALCITONIN - Abnormal; Notable for the following components:    Procalcitonin 0.77 (*)     All other components within normal limits   URINALYSIS MICROSCOPIC - Abnormal; Notable for the following components:    WBC, UA 29 (*)     Bacteria Many (*)     All other components within normal limits    Narrative:     Preferred Collection Type->Urine, Clean Catch   INFLUENZA A & B BY MOLECULAR   CULTURE, BLOOD   CULTURE, BLOOD   CULTURE, URINE   LACTIC ACID, PLASMA   PROTIME-INR   APTT   LIPASE   TROPONIN I   LACTIC ACID, PLASMA   TYPE & SCREEN     EKG Readings: (Independently Interpreted)   Initial  Reading: No STEMI. Rhythm: Sinus Tachycardia. Heart Rate: 105. Ectopy: No Ectopy. Conduction: Normal.   KYUNG     ECG Results          EKG 12-lead (Final result)  Result time 01/13/20 13:08:37    Final result by [facility] (01/13/20 13:08:37)                 Narrative:    Test Reason : R00.0,    Vent. Rate : 105 BPM     Atrial Rate : 109 BPM     P-R Int : 140 ms          QRS Dur : 080 ms      QT Int : 306 ms       P-R-T Axes : 064 079 064 degrees     QTc Int : 405 ms      Sinus tachycardia  Possible Right atrial enlargement  Otherwise normal ECG    When compared with ECG of 07-JAN-2020 17:24,  No significant change was found  Confirmed by RONALD LOPEZ, MARY ANNE ([id number]) on 1/13/2020 1:08:26 PM    Referred By: JAVIER   SELF           Confirmed By:MARY ANNE CARDENAS MD                            Imaging Results          CT Abdomen Pelvis With Contrast (Preliminary result)  Result time 01/13/20 16:32:46    Preliminary result by Nikolai Cuevas MD (01/13/20 16:32:46)                 Impression:      Moderate degree of perinephric stranding around the left kidney with striated nephrograms on excretory phase most compatible with acute pyelonephritis.  Consider correlation with urinalysis and urine cultures.    Status post cystectomy, prostatectomy, partial small-bowel resection with creation of ileal conduit and ureteroneocystostomy in this patient with history of prostate cancer.  Double-J stent noted within the left renal pelvis extending through the ileal conduit and is external to the patient along with another catheter within the ileal conduit itself extending externally.    Minimal right-sided hydronephrosis with minimal dilatation of the proximal right ureter.    Large L4 sclerotic lesion, likely metastatic.  Correlation with prior examinations and bone scan if available is recommended.    Electronically signed by resident: Matilde Leo  Date:    01/13/2020  Time:    16:02               Narrative:    EXAMINATION:  CT  ABDOMEN PELVIS WITH CONTRAST    CLINICAL HISTORY:  Abd pain, fever, abscess suspected;    TECHNIQUE:  Axial CT images of the abdomen and pelvis obtained after the administration of intravenous contrast 100 mL of Omnipaque 350.  Coronal sagittal reformats are provided.  Oral contrast was not administered.    COMPARISON:  X-ray abdomen 01/07/2020.    FINDINGS:  Lung bases show mild atelectatic changes.  No pleural effusion.  Heart is not enlarged.  No pericardial effusion.    Liver is normal in size and attenuation.  No focal hepatic abnormality is seen.  Gallbladder is normal.  No intra or extrahepatic biliary ductal dilatation.  Spleen is upper normal in size.    Stomach, pancreas, and adrenal glands appear within normal limits.    Patient is status post cystectomy, prostatectomy, and partial small-bowel resection.  Left lower mid abdomen ileostomy noted.  Creation of ileal conduit and ureteroneocystostomy.  Kidneys are normal in size and location.  No renal mass.  Minimal right-sided hydronephrosis and minimal dilatation of the proximal right ureter.  Moderate degree of nonspecific perinephric stranding around the left kidney without obvious fluid collections.  Striated nephrogram of the left kidney seen on expiratory phase.  Double-J stent is present within the left renal pelvis extending to the ileal conduit in the right lower quadrant and exiting external to the patient.  Another catheter within the ileal conduit itself with an external course.  No bowel obstruction or obvious inflammation.  Appendix is normal.  Scattered colonic diverticula without evidence of diverticulitis.    No lymphadenopathy in the abdomen and pelvis.  No free intraperitoneal air or ascites.  Abdominal aorta is normal in course and caliber with severe atherosclerotic calcifications distally.  Osseous structures show degenerative changes of the thoracic and lumbar spine.  Mild thoracolumbar scoliosis.  Large sclerotic lesion at L4  measuring 2.1 x 2.3 cm.  Correlation with prior examinations if available is recommended to exclude bone metastases.  If not available consider bone scan.    Healing midline laparotomy scar.                    Preliminary result by SPENSER Rose (01/13/20 16:20:55)                 Impression:      Moderate degree of perinephric stranding around the left kidney with striated nephrograms on excretory phase most compatible with acute pyelonephritis.  Consider correlation with urinalysis and urine cultures.    Status post cystectomy, prostatectomy, partial small-bowel resection with creation of ileal conduit and ureteroneocystostomy in this patient with history of prostate cancer.  Double-J stent noted within the left renal pelvis extending through the ileal conduit and is external to the patient along with another catheter within the ileal conduit itself extending externally.    Minimal right-sided hydronephrosis with minimal dilatation of the proximal right ureter.    Large L4 sclerotic lesion, likely metastatic.  Correlation with prior examinations and bone scan if available is recommended.    Electronically signed by resident: Matilde Leo  Date:    01/13/2020  Time:    16:02               Narrative:    EXAMINATION:  CT ABDOMEN PELVIS WITH CONTRAST    CLINICAL HISTORY:  Abd pain, fever, abscess suspected;    TECHNIQUE:  Axial CT images of the abdomen and pelvis obtained after the administration of intravenous contrast 100 mL of Omnipaque 350.  Coronal sagittal reformats are provided.  Oral contrast was not administered.    COMPARISON:  X-ray abdomen 01/07/2020.    FINDINGS:  Lung bases show mild atelectatic changes.  No pleural effusion.  Heart is not enlarged.  No pericardial effusion.    Liver is normal in size and attenuation.  No focal hepatic abnormality is seen.  Gallbladder is normal.  No intra or extrahepatic biliary ductal dilatation.  Spleen is upper normal in size.    Stomach, pancreas, and  adrenal glands appear within normal limits.    Patient is status post cystectomy, prostatectomy, and partial small-bowel resection.  Left lower mid abdomen ileostomy noted.  Creation of ileal conduit and ureteroneocystostomy.  Kidneys are normal in size and location.  No renal mass.  Minimal right-sided hydronephrosis and minimal dilatation of the proximal right ureter.  Moderate degree of nonspecific perinephric stranding around the left kidney without obvious fluid collections.  Striated nephrogram of the left kidney seen on expiratory phase.  Double-J stent is present within the left renal pelvis extending to the ileal conduit in the right lower quadrant and exiting external to the patient.  Another catheter within the ileal conduit itself with an external course.  No bowel obstruction or obvious inflammation.  Appendix is normal.  Scattered colonic diverticula without evidence of diverticulitis.    No lymphadenopathy in the abdomen and pelvis.  No free intraperitoneal air or ascites.  Abdominal aorta is normal in course and caliber with severe atherosclerotic calcifications distally.  Osseous structures show degenerative changes of the thoracic and lumbar spine.  Mild thoracolumbar scoliosis.  Large sclerotic lesion at L4 measuring 2.1 x 2.3 cm.  Correlation with prior examinations if available is recommended to exclude bone metastases.  If not available consider bone scan.    Healing midline laparotomy scar.                    Preliminary result by SPENSER Rose (01/13/20 16:18:30)                 Impression:      Moderate degree of nonspecific perinephric stranding around the left kidney which could be seen in the settings of acute pyelonephritis.  Consider correlation with urinalysis and urine cultures.    Status post cystectomy, prostatectomy, partial small-bowel resection with creation of ileal conduit and ureteroneocystostomy in this patient with history of prostate cancer.  Double-J stent noted  within the left renal pelvis extending through the ileal conduit and is external to the patient along with another catheter within the ileal conduit itself extending externally.    Minimal right-sided hydronephrosis with minimal dilatation of the proximal right ureter.    Large L4 sclerotic lesion, likely metastatic.  Correlation with prior examinations and bone scan if available is recommended.    Electronically signed by resident: Matilde Leo  Date:    01/13/2020  Time:    16:02               Narrative:    EXAMINATION:  CT ABDOMEN PELVIS WITH CONTRAST    CLINICAL HISTORY:  Abd pain, fever, abscess suspected;    TECHNIQUE:  Axial CT images of the abdomen and pelvis obtained after the administration of intravenous contrast 100 mL of Omnipaque 350.  Coronal sagittal reformats are provided.  Oral contrast was not administered.    COMPARISON:  X-ray abdomen 01/07/2020.    FINDINGS:  Lung bases show mild atelectatic changes.  No pleural effusion.  Heart is not enlarged.  No pericardial effusion.    Liver is normal in size and attenuation.  No focal hepatic abnormality is seen.  Gallbladder is normal.  No intra or extrahepatic biliary ductal dilatation.  Spleen is upper normal in size.    Stomach, pancreas, and adrenal glands appear within normal limits.    Patient is status post cystectomy, prostatectomy, and partial small-bowel resection.  Left lower mid abdomen ileostomy noted.  Creation of ileal conduit and ureteroneocystostomy.  Kidneys are normal in size and location.  No renal mass.  Minimal right-sided hydronephrosis and minimal dilatation of the proximal right ureter.  Moderate degree of nonspecific perinephric stranding around the left kidney without obvious fluid collections.  Double-J stent is present within the left renal pelvis extending to the ileal conduit in the right lower quadrant and exiting external to the patient.  Another catheter within the ileal conduit itself with an external course.  No  bowel obstruction or obvious inflammation.  Appendix is normal.  Scattered colonic diverticula without evidence of diverticulitis.    No lymphadenopathy in the abdomen and pelvis.  No free intraperitoneal air or ascites.  Abdominal aorta is normal in course and caliber with severe atherosclerotic calcifications distally.  Osseous structures show degenerative changes of the thoracic and lumbar spine.  Mild thoracolumbar scoliosis.  Large sclerotic lesion at L4 measuring 2.1 x 2.3 cm.  Correlation with prior examinations if available is recommended to exclude bone metastases.  If not available consider bone scan.    Healing midline laparotomy scar.                               X-Ray Chest AP Portable (Final result)  Result time 01/13/20 13:11:00    Final result by Oscar Thurman Jr., MD (01/13/20 13:11:00)                 Impression:      No significant cardiopulmonary abnormality seen.  Left chest port noted.      Electronically signed by: Oscar Thurman MD  Date:    01/13/2020  Time:    13:11             Narrative:    EXAMINATION:  XR CHEST AP PORTABLE    CLINICAL HISTORY:  Sepsis;    TECHNIQUE:  Single frontal view of the chest was performed.    COMPARISON:  None    FINDINGS:  Left chest port extends overlying the SVC.  Monitoring leads are noted.  Lungs are slightly hypoaerated.  No confluent consolidation or pneumothorax.  Heart size and pulmonary vessels are normal.                              X-Rays:   Independently Interpreted Readings:   Other Readings:  Independently reviewed CXR image, no pna seen    Medical Decision Making:   History:   I obtained history from: someone other than patient.       <> Summary of History: Wife and daughter assist HPI  Old Medical Records: I decided to obtain old medical records.  Old Records Summarized: records from previous admission(s).       <> Summary of Records: cultures  Independently Interpreted Test(s):   I have ordered and independently interpreted X-rays - see prior  notes.  I have ordered and independently interpreted EKG Reading(s) - see prior notes  Clinical Tests:   Lab Tests: Ordered and Reviewed  Radiological Study: Ordered and Reviewed  Medical Tests: Ordered and Reviewed  Sepsis Perfusion Assessment: I attest, a sepsis perfusion exam was performed within 6 hours of Septic Shock presentation, following fluid resuscitation.  ED Management:  54-year-old male presents with acute febrile illness.  Vitals confirm.  Physical exam as above.  Labs without severe sepsis or septic shock.  CT without abscess.  Concerning for pyelonephritis.  Treated with Zosyn.  Will admit to Internal Medicine step-down.  Stable throughout ED course.  Given 30 cc/kilogram of normal saline for his ideal body weight of 70.7 kg.  Did bedside teaching.  All questions answered.  Patient acknowledges understanding.  Other:   I have discussed this case with another health care provider.       <> Summary of the Discussion: IM, admission              Attending Attestation:         Attending Critical Care:   Critical Care Times:   Direct Patient Care (initial evaluation, reassessments, and time considering the case)................................................................15 minutes.   Additional History from reviewing old medical records or taking additional history from the family, EMS, PCP, etc.......................5 minutes.   Ordering, Reviewing, and Interpreting Diagnostic Studies...............................................................................................................5 minutes.   Documentation..................................................................................................................................................................................5 minutes.   Consultation with other Physicians. .................................................................................................................................................5 minutes.    ==============================================================  · Total Critical Care Time - exclusive of procedural time: 35 minutes.  ==============================================================  Critical care reasons: pyelonephritis.   The following critical care procedures were done by me (see procedure notes): pulse oximetry.   Critical care was time spent personally by me on the following activities: obtaining history from patient or relative, examination of patient, review of old charts, ordering lab, x-rays, and/or EKG, development of treatment plan with patient or relative, ordering and performing treatments and interventions, evaluation of patient's response to treatment, discussion with consultants, interpretation of cardiac measurements and re-evaluation of patient's conition.   Critical Care Condition: potentially life-threatening                             Clinical Impression:   Final diagnoses:  [R00.0] Tachycardia  [N10] Pyelonephritis, acute (Primary)  [R50.9] Febrile illness, acute    Disposition:   Disposition: Admitted  Condition: Deb Keen MD  01/13/20 8758

## 2020-01-13 NOTE — LETTER
January 13, 2020      Lala Juarez MD  [address]  [address]  [place] [address]           [facility]  [address]  [facility] [address]  Phone: [phone]          Patient: Kiko Gruber   MR Number: [id number]   YOB: 1965   Date of Visit: 1/13/2020       Dear Dr. Lala Juarez:    Thank you for referring Kiko Gruber to me for evaluation. Attached you will find relevant portions of my assessment and plan of care.    If you have questions, please do not hesitate to call me. I look forward to following Kiko Gruber along with you.    Sincerely,    Nancy Spears, CNS    Enclosure  CC:  No Recipients    If you would like to receive this communication electronically, please contact [email or web address] or [phone] to request more information on [employer] access.    For providers and/or their staff who would like to refer a patient to [facility], please contact us through our one-stop-shop provider referral line, Levi Gamez, at [phone].    If you feel you have received this communication in error or would no longer like to receive these types of communications, please e-mail [email or web address]

## 2020-01-13 NOTE — H&P
[facility] Hospital Medicine  History & Physical    Patient Name: Kiko Gruber  MRN: [id number]  Admission Date: 1/13/2020  Attending Physician: Michelle Ray MD   Primary Care Provider: Lala Juarez MD    [facility] Medicine Team: [facility] [place] Michelle Ray MD     Patient information was obtained from patient, spouse/SO, past medical records and ER records.     Subjective:     Principal Problem:Pyelonephritis    Chief Complaint:   Chief Complaint   Patient presents with    Fever     Pt reports fever, poor appetite.  Pt s/p bowel resection 12//27.         HPI:  54 y.o. Male with metastatic prostate cancer on chemo, s/p ex lap with small bowel resection and end ileostomy placement with ureterneocystostomy on 12/22/19 recently admitted for hyponatremia, hypomagnesemia, and JORGE ALBERTO. Evaluated by Gen Surg and Urology. Stable on Loperamide and Lomotil. Hyponatremia and JORGE ALBERTO improved. Electrolyte derangement likely due to high ostomy output, goal of </= 1L ostomy output/day. Patient presented today for post-op evaluation. Denies any pain, nausea, vomiting. Reports drinking >2L of Pedialyte and Gatorade per day to stay hydrated. Denies redness around or drainage from incisions. Doing well post-operatively until last night when he developed poor appetite and decreased intake. Found to have fever in Clinic associated with tachycardia. Wife reports that patient had fever and rigors last night but would not seek care until his appointments today. Patient was sent to ED from Clinic.    Past Medical History:   Diagnosis Date    Cancer     Prostate    Hypertension        Past Surgical History:   Procedure Laterality Date    ABDOMINAL SURGERY      REIMPLANT URETER IN BLADDER Left 12/22/2019    Procedure: URETERONEOCYSTOSTOMY reimplant to conduit;  Surgeon: Manish Styles MD;  Location: [facility] OR [address] [place] [address];  Service: Urology;  Laterality: Left;       Review of patient's allergies  indicates:  No Known Allergies    No current facility-administered medications on file prior to encounter.      Current Outpatient Medications on File Prior to Encounter   Medication Sig    diphenoxylate-atropine 2.5-0.025 mg (LOMOTIL) 2.5-0.025 mg per tablet Take 2 tablets by mouth 2 (two) times daily.    loperamide (IMODIUM) 2 mg capsule Take 2 capsules (4 mg total) by mouth 4 (four) times daily.    miconazole NITRATE 2 % (MICOTIN) 2 % top powder Apply topically as needed (for peristomal excoriation).    omeprazole (PRILOSEC) 40 MG capsule Take 40 mg by mouth once daily.    oxyCODONE (ROXICODONE) 5 MG immediate release tablet Take 1 tablet (5 mg total) by mouth every 4 (four) hours as needed.    predniSONE (DELTASONE) 5 MG tablet Take 5 mg by mouth 2 (two) times daily.    scopolamine (TRANSDERM-SCOP) 1.3-1.5 mg (1 mg over 3 days) Place 1 patch onto the skin Every 3 (three) days.    HYDROcodone-acetaminophen (NORCO)  mg per tablet TK 1 T PO  Q 6 H PRN P    nicotine (NICODERM CQ) 14 mg/24 hr Place 1 patch onto the skin once daily. for 14 days     Family History     None        Tobacco Use    Smoking status: Current Every Day Smoker     Packs/day: 1.00     Years: 18.00     Pack years: 18.00     Types: Cigarettes     Start date: 1991    Smokeless tobacco: Never Used   Substance and Sexual Activity    Alcohol use: Not Currently     Alcohol/week: 70.0 standard drinks     Types: 70 Cans of beer per week     Frequency: 4 or more times a week     Drinks per session: 10 or more     Binge frequency: Monthly    Drug use: Never    Sexual activity: Yes     Partners: Female     Birth control/protection: None     Review of Systems   Constitutional: Positive for activity change, appetite change, chills, fatigue and fever.   HENT: Negative.    Eyes: Negative.    Respiratory: Negative.  Negative for shortness of breath.    Cardiovascular: Negative.  Negative for chest pain.   Gastrointestinal: Negative.  Negative  for abdominal pain, nausea and vomiting.   Musculoskeletal: Negative.    Skin: Negative.    Neurological: Negative.    Psychiatric/Behavioral: Negative.      Objective:     Vital Signs (Most Recent):  Temp: 100.1 °F (37.8 °C) (01/13/20 1617)  Pulse: 102 (01/13/20 1606)  Resp: 16 (01/13/20 1209)  BP: 100/61 (01/13/20 1528)  SpO2: 99 % (01/13/20 1606) Vital Signs (24h Range):  Temp:  [98.6 °F (37 °C)-101.2 °F (38.4 °C)] 100.1 °F (37.8 °C)  Pulse:  [] 102  Resp:  [16] 16  SpO2:  [96 %-100 %] 99 %  BP: ()/(53-65) 100/61     Weight: 72.1 kg (159 lb)  Body mass index is 23.48 kg/m².    Physical Exam   Constitutional: He is cooperative.   HENT:   Mouth/Throat: Mucous membranes are not cyanotic.   Eyes: Conjunctivae and lids are normal.   Cardiovascular: S1 normal and S2 normal. Tachycardia present.   Pulmonary/Chest: Effort normal and breath sounds normal.   Abdominal: Soft. Bowel sounds are normal. There is no tenderness.   Ileostomy, colostomy   Musculoskeletal: He exhibits no edema.   Neurological: He is alert. He is not disoriented.   Skin: Skin is warm and dry.   Psychiatric: He has a normal mood and affect.       Significant Labs:     CBC:   Recent Labs   Lab 01/13/20  1305   WBC 19.14*   HGB 10.8*   HCT 32.8*   *     CMP:   Recent Labs   Lab 01/13/20  1305   *   K 5.5*   CL 91*   CO2 19*   GLU 89   BUN 31*   CREATININE 1.9*   CALCIUM 10.1   PROT 7.8   ALBUMIN 3.2*   BILITOT 0.4   ALKPHOS 131   AST 15   ALT 40   ANIONGAP 11   EGFRNONAA 38.8*     Coagulation:   Recent Labs   Lab 01/13/20  1305   INR 1.1   APTT 30.1     Lactic Acid:   Recent Labs   Lab 01/13/20  1305 01/13/20  1618   LACTATE 1.2 1.1     Lipase:   Recent Labs   Lab 01/13/20  1305   LIPASE 31     Magnesium:   Recent Labs   Lab 01/13/20  1305   MG 1.0*     Troponin:   Recent Labs   Lab 01/13/20  1305   TROPONINI <0.006     Urine Studies:   Recent Labs   Lab 01/13/20  1441   COLORU Yellow   APPEARANCEUA Hazy*   PHUR 6.0   SPECGRAV  1.005   PROTEINUA 1+*   GLUCUA Negative   KETONESU Negative   BILIRUBINUA Negative   OCCULTUA 2+*   NITRITE Negative   LEUKOCYTESUR 2+*   RBCUA 3   WBCUA 29*   BACTERIA Many*   HYALINECASTS 0       Significant Imaging: CT: I have reviewed all pertinent results/findings within the past 24 hours and my personal findings are:  agree with radiologist's findings   Moderate degree of perinephric stranding around the left kidney with striated nephrograms on excretory phase most suggestive of acute pyelonephritis.  Consider correlation with urinalysis and urine cultures.  Status post cystectomy, prostatectomy, partial small-bowel resection with creation of ileal conduit and ureteroneocystostomy in this patient with history of prostate cancer.  Double-J stent noted within the left renal pelvis extending through the ileal conduit and is external to the patient along with another catheter within the ileal conduit itself extending externally.  Minimal right-sided hydronephrosis with minimal dilatation of the proximal right ureter.  Large L4 sclerotic lesion, concerning for metastatic disease.  Correlation with prior examinations and bone scan if available is recommended.    Assessment/Plan:     Active Diagnoses:    Diagnosis Date Noted POA    PRINCIPAL PROBLEM:  Pyelonephritis [N12] 01/13/2020 Yes    Sepsis [A41.9] 01/13/2020 Yes    Prostate cancer [C61] 01/06/2020 Yes    Nicotine dependence [F17.200] 12/30/2019 Yes    Hypomagnesemia [E83.42] 12/30/2019 Yes    Hyponatremia [E87.1] 12/30/2019 Yes      Problems Resolved During this Admission:     Inpatient Medications prescribed for management of Current Problems:   Scheduled Meds:    diphenoxylate-atropine 2.5-0.025 mg  2 tablet Oral BID    enoxaparin  40 mg Subcutaneous Daily    loperamide  4 mg Oral QID    magnesium sulfate IVPB  2 g Intravenous Q2H    [START ON 1/14/2020] magnesium sulfate IVPB  2 g Intravenous Daily    [START ON 1/14/2020] nicotine  1 patch  Transdermal Daily    [START ON 1/14/2020] pantoprazole  40 mg Oral Daily    piperacillin-tazobactam (ZOSYN) IVPB  4.5 g Intravenous Q8H    predniSONE  5 mg Oral BID    scopolamine  1 patch Transdermal Q3 Days     Continuous Infusions:    sodium chloride 0.9% 75 mL/hr at 01/13/20 1747     As Needed: acetaminophen, miconazole NITRATE 2 %, oxyCODONE, sodium chloride 0.9%    Assessment and Plan by Problem     Sepsis due to Pyelonephritis  Febrile, tachycardic. No significant hypotension.  Received 2.2 L IV fluids in ED. Zosyn started.  Blood and urine cultures pending     JORGE ALBERTO  Baseline sCr 1.0 - 1.4.  Continue NS for now     Hyponatremia  Recurrent. Continuing NS.     Hypomagnesemia  Chronic and severe.   Repletion IV for now as oral MagOx expected to increase colostomy output.     Prostate cancer  Plans to resume chemo after infection clears.     Nicotine dependence  Recommend cessation.    Diet Adult Regular (IDDSI Level 7) [facility]    Significant LDAs:     HIGH RISK CONDITION(S):   Patient has a condition that poses threat to life and bodily function: Acute Renal Failure     Discharge plan and follow up  Home or Self Care    VTE Risk Mitigation (From admission, onward)         Ordered     enoxaparin injection 40 mg  Daily      01/13/20 0966                Michelle Ray MD  Department of Hospital Medicine   [facility]

## 2020-01-14 LAB
ALBUMIN SERPL BCP-MCNC: 2.4 G/DL (ref 3.5–5.2)
ALP SERPL-CCNC: 101 U/L (ref 55–135)
ALT SERPL W/O P-5'-P-CCNC: 25 U/L (ref 10–44)
ANION GAP SERPL CALC-SCNC: 11 MMOL/L (ref 8–16)
AST SERPL-CCNC: 9 U/L (ref 10–40)
BACTERIA UR CULT: NORMAL
BACTERIA UR CULT: NORMAL
BASOPHILS # BLD AUTO: 0.05 K/UL (ref 0–0.2)
BASOPHILS NFR BLD: 0.4 % (ref 0–1.9)
BILIRUB SERPL-MCNC: 0.2 MG/DL (ref 0.1–1)
BUN SERPL-MCNC: 23 MG/DL (ref 6–20)
CALCIUM SERPL-MCNC: 8.9 MG/DL (ref 8.7–10.5)
CHLORIDE SERPL-SCNC: 97 MMOL/L (ref 95–110)
CO2 SERPL-SCNC: 20 MMOL/L (ref 23–29)
CREAT SERPL-MCNC: 1.7 MG/DL (ref 0.5–1.4)
DIFFERENTIAL METHOD: ABNORMAL
EOSINOPHIL # BLD AUTO: 0 K/UL (ref 0–0.5)
EOSINOPHIL NFR BLD: 0.3 % (ref 0–8)
ERYTHROCYTE [DISTWIDTH] IN BLOOD BY AUTOMATED COUNT: 14.3 % (ref 11.5–14.5)
EST. GFR  (AFRICAN AMERICAN): 51.3 ML/MIN/1.73 M^2
EST. GFR  (NON AFRICAN AMERICAN): 44.4 ML/MIN/1.73 M^2
GLUCOSE SERPL-MCNC: 111 MG/DL (ref 70–110)
HCT VFR BLD AUTO: 27.9 % (ref 40–54)
HGB BLD-MCNC: 9.2 G/DL (ref 14–18)
IMM GRANULOCYTES # BLD AUTO: 0.21 K/UL (ref 0–0.04)
IMM GRANULOCYTES NFR BLD AUTO: 1.8 % (ref 0–0.5)
LYMPHOCYTES # BLD AUTO: 0.4 K/UL (ref 1–4.8)
LYMPHOCYTES NFR BLD: 3.2 % (ref 18–48)
MAGNESIUM SERPL-MCNC: 2.1 MG/DL (ref 1.6–2.6)
MCH RBC QN AUTO: 29.6 PG (ref 27–31)
MCHC RBC AUTO-ENTMCNC: 33 G/DL (ref 32–36)
MCV RBC AUTO: 90 FL (ref 82–98)
MONOCYTES # BLD AUTO: 0.9 K/UL (ref 0.3–1)
MONOCYTES NFR BLD: 7.7 % (ref 4–15)
NEUTROPHILS # BLD AUTO: 10.1 K/UL (ref 1.8–7.7)
NEUTROPHILS NFR BLD: 86.6 % (ref 38–73)
NRBC BLD-RTO: 0 /100 WBC
PHOSPHATE SERPL-MCNC: 3.8 MG/DL (ref 2.7–4.5)
PLATELET # BLD AUTO: 305 K/UL (ref 150–350)
PMV BLD AUTO: 9.5 FL (ref 9.2–12.9)
POTASSIUM SERPL-SCNC: 4.4 MMOL/L (ref 3.5–5.1)
PROT SERPL-MCNC: 6 G/DL (ref 6–8.4)
RBC # BLD AUTO: 3.11 M/UL (ref 4.6–6.2)
SODIUM SERPL-SCNC: 128 MMOL/L (ref 136–145)
WBC # BLD AUTO: 11.71 K/UL (ref 3.9–12.7)

## 2020-01-14 PROCEDURE — 83735 ASSAY OF MAGNESIUM: CPT

## 2020-01-14 PROCEDURE — 99232 SBSQ HOSP IP/OBS MODERATE 35: CPT | Mod: ,,, | Performed by: INTERNAL MEDICINE

## 2020-01-14 PROCEDURE — 97802 MEDICAL NUTRITION INDIV IN: CPT

## 2020-01-14 PROCEDURE — 36415 COLL VENOUS BLD VENIPUNCTURE: CPT

## 2020-01-14 PROCEDURE — 11000001 HC ACUTE MED/SURG PRIVATE ROOM

## 2020-01-14 PROCEDURE — 84100 ASSAY OF PHOSPHORUS: CPT

## 2020-01-14 PROCEDURE — S4991 NICOTINE PATCH NONLEGEND: HCPCS | Performed by: PHYSICIAN ASSISTANT

## 2020-01-14 PROCEDURE — 99232 PR SUBSEQUENT HOSPITAL CARE,LEVL II: ICD-10-PCS | Mod: ,,, | Performed by: INTERNAL MEDICINE

## 2020-01-14 PROCEDURE — 63600175 PHARM REV CODE 636 W HCPCS: Performed by: INTERNAL MEDICINE

## 2020-01-14 PROCEDURE — 80053 COMPREHEN METABOLIC PANEL: CPT

## 2020-01-14 PROCEDURE — 25000003 PHARM REV CODE 250: Performed by: PHYSICIAN ASSISTANT

## 2020-01-14 PROCEDURE — 85025 COMPLETE CBC W/AUTO DIFF WBC: CPT

## 2020-01-14 PROCEDURE — 25000003 PHARM REV CODE 250: Performed by: INTERNAL MEDICINE

## 2020-01-14 RX ORDER — LOPERAMIDE HYDROCHLORIDE 2 MG/1
4 CAPSULE ORAL 4 TIMES DAILY
Status: DISCONTINUED | OUTPATIENT
Start: 2020-01-14 | End: 2020-01-16 | Stop reason: HOSPADM

## 2020-01-14 RX ORDER — IBUPROFEN 200 MG
1 TABLET ORAL DAILY
Status: DISCONTINUED | OUTPATIENT
Start: 2020-01-14 | End: 2020-01-16 | Stop reason: HOSPADM

## 2020-01-14 RX ORDER — PANTOPRAZOLE SODIUM 40 MG/1
40 TABLET, DELAYED RELEASE ORAL NIGHTLY
Status: DISCONTINUED | OUTPATIENT
Start: 2020-01-14 | End: 2020-01-16 | Stop reason: HOSPADM

## 2020-01-14 RX ADMIN — PIPERACILLIN AND TAZOBACTAM 4.5 G: 4; .5 INJECTION, POWDER, FOR SOLUTION INTRAVENOUS at 09:01

## 2020-01-14 RX ADMIN — DIPHENOXYLATE HYDROCHLORIDE AND ATROPINE SULFATE 2 TABLET: 2.5; .025 TABLET ORAL at 09:01

## 2020-01-14 RX ADMIN — LOPERAMIDE HYDROCHLORIDE 4 MG: 2 CAPSULE ORAL at 09:01

## 2020-01-14 RX ADMIN — PREDNISONE 5 MG: 5 TABLET ORAL at 09:01

## 2020-01-14 RX ADMIN — Medication 1 PATCH: at 01:01

## 2020-01-14 RX ADMIN — PANTOPRAZOLE SODIUM 40 MG: 40 TABLET, DELAYED RELEASE ORAL at 01:01

## 2020-01-14 RX ADMIN — LOPERAMIDE HYDROCHLORIDE 4 MG: 2 CAPSULE ORAL at 01:01

## 2020-01-14 RX ADMIN — PIPERACILLIN AND TAZOBACTAM 4.5 G: 4; .5 INJECTION, POWDER, FOR SOLUTION INTRAVENOUS at 01:01

## 2020-01-14 RX ADMIN — LOPERAMIDE HYDROCHLORIDE 4 MG: 2 CAPSULE ORAL at 05:01

## 2020-01-14 RX ADMIN — ALUMINUM HYDROXIDE, MAGNESIUM HYDROXIDE, AND SIMETHICONE 50 ML: 200; 200; 20 SUSPENSION ORAL at 05:01

## 2020-01-14 RX ADMIN — PIPERACILLIN AND TAZOBACTAM 4.5 G: 4; .5 INJECTION, POWDER, FOR SOLUTION INTRAVENOUS at 05:01

## 2020-01-14 RX ADMIN — MAGNESIUM SULFATE IN WATER 2 G: 40 INJECTION, SOLUTION INTRAVENOUS at 01:01

## 2020-01-14 RX ADMIN — ENOXAPARIN SODIUM 40 MG: 100 INJECTION SUBCUTANEOUS at 05:01

## 2020-01-14 RX ADMIN — PANTOPRAZOLE SODIUM 40 MG: 40 TABLET, DELAYED RELEASE ORAL at 09:01

## 2020-01-14 RX ADMIN — MAGNESIUM SULFATE IN WATER 2 G: 40 INJECTION, SOLUTION INTRAVENOUS at 02:01

## 2020-01-14 NOTE — CONSULTS
"  [facility]  Adult Nutrition  Consult Note    SUMMARY     Recommendations    Recommendation:   1. Continue regular diet, encourage good PO intake >50% of meals, if needed change to bland diet for pt to tolerate better   2. Add Sotero BID and Promod TID to increase intake   3. Add [employer] ONS when available   4. Add MVI   RD to monitor and follow up     Goals: pt to consume >50% of EEN/EPN by RD follow up   Nutrition Goal Status: new  Communication of RD Recs: other (comment)(POC)    Reason for Assessment    Reason For Assessment: consult  Diagnosis: (SOB)  Relevant Medical History: HTN; DM; HLD; CKD; CHF  Interdisciplinary Rounds: did not attend  General Information Comments: Pt seen by RD x 4 days ago, readmitted to hospital with decreased PO intake since sunday. Pt with high ostomy output and nausea/vomiting. Discussed with pt eating balanced diet and ONS to aid in meeting needs. Pt continues to drink Pedilyte and gatorade to aid in electrolyte intake. Pt wiling to try Sotero and Promod to increase intake. When [employer] is available, will add ONS. Pt with wt loss of ~9 lbs x 4 days. UBW ~205 lbs previously pt with wt loss. NFPE completed on 1/10, pt with muscle and fat loss meets criteria for moderate malnutrition.   Nutrition Discharge Planning: adequate intake via regular diet     Nutrition Risk Screen    Nutrition Risk Screen: other (see comments)(Poor appetite, chemo)    Nutrition/Diet History    Spiritual, Cultural Beliefs, Adventism Practices, Values that Affect Care: no  Food Allergies: NKFA  Factors Affecting Nutritional Intake: nausea/vomiting, altered gastrointestinal function    Anthropometrics    Temp: 98.3 °F (36.8 °C)  Height: 5' 9" (175.3 cm)  Height (inches): 69 in  Weight Method: Bed Scale  Weight: 74.5 kg (164 lb 3.9 oz)  Weight (lb): 164.24 lb  Ideal Body Weight (IBW), Male: 160 lb  % Ideal Body Weight, Male (lb): 99.38 %  BMI (Calculated): 24.2  BMI Grade: 18.5-24.9 - " normal  Usual Body Weight (UBW), k.18 kg  % Usual Body Weight: 80.12       Lab/Procedures/Meds    Pertinent Labs Reviewed: reviewed  Pertinent Labs Comments: Na 128; BUN 23; Cr 1.7; Glucose 111; GFR 44  Pertinent Medications Reviewed: reviewed  Pertinent Medications Comments: magnesium sulfate; prednisone; pantoprazole     Estimated/Assessed Needs    Weight Used For Calorie Calculations: 74.5 kg (164 lb 3.9 oz)  Energy Calorie Requirements (kcal): 2041 kcal/d   Energy Need Method: [place]-St Jeor(x1.3 )  Protein Requirements:  g/day   Weight Used For Protein Calculations: 74.5 kg (164 lb 3.9 oz)  Fluid Requirements (mL): 1 mL/kcal or per MD  RDA Method (mL):   CHO Requirement: 255     Nutrition Prescription Ordered    Current Diet Order: Regular diet     Evaluation of Received Nutrient/Fluid Intake    I/O: -2.5 L since admit  Energy Calories Required: not meeting needs  Protein Required: not meeting needs  Comments: LBM   Tolerance: tolerating  % Intake of Estimated Energy Needs: 0 - 25 %  % Meal Intake: 0 - 25 %    Nutrition Risk    Level of Risk/Frequency of Follow-up: high     Assessment and Plan  Nutrition Problem:   Moderate  Protein-Calorie Malnutrition  Malnutrition in the context of Chronic Illness/Injury    Related to (etiology):  Decreased intake     Signs and Symptoms (as evidenced by):  Energy Intake: <75% of estimated energy requirement for >1 week   Body Fat Depletion: mild and moderate depletion of orbitals and triceps   Muscle Mass Depletion: mild and moderate depletion of temples, clavicle region, interosseous muscle and lower extremities   Weight Loss: 15% x 5 years      Interventions(treatment strategy):  Collaboration of care with other providers  [email or web address] beverage     Nutrition Diagnosis Status:  New    Monitor and Evaluation    Food and Nutrient Intake: energy intake, food and beverage intake  Food and Nutrient Adminstration: diet order  Anthropometric Measurements: weight  change, weight  Biochemical Data, Medical Tests and Procedures: electrolyte and renal panel, lipid profile, gastrointestinal profile, glucose/endocrine profile, inflammatory profile  Nutrition-Focused Physical Findings: overall appearance     Malnutrition Assessment  Malnutrition Type: chronic illness  Weight Loss (Malnutrition): (15% x 5 years per pt)  Energy Intake (Malnutrition): less than 75% for greater than 7 days  Subcutaneous Fat (Malnutrition): moderate depletion  Muscle Mass (Malnutrition): moderate depletion   Orbital Region (Subcutaneous Fat Loss): mild depletion  Upper Arm Region (Subcutaneous Fat Loss): mild depletion   [place] Region (Muscle Loss): moderate depletion  Clavicle Bone Region (Muscle Loss): moderate depletion  Clavicle and Acromion Bone Region (Muscle Loss): moderate depletion  Dorsal Hand (Muscle Loss): mild depletion  Anterior Thigh Region (Muscle Loss): mild depletion  Posterior Calf Region (Muscle Loss): mild depletion       Nutrition Follow-Up    RD Follow-up?: Yes

## 2020-01-14 NOTE — SUBJECTIVE & OBJECTIVE
Past Medical History:   Diagnosis Date    Cancer     Prostate    Hypertension        Past Surgical History:   Procedure Laterality Date    ABDOMINAL SURGERY      REIMPLANT URETER IN BLADDER Left 12/22/2019    Procedure: URETERONEOCYSTOSTOMY reimplant to conduit;  Surgeon: Manish Styles MD;  Location: [facility] OR [address] [place] [address];  Service: Urology;  Laterality: Left;       Review of patient's allergies indicates:  No Known Allergies    Family History     None          Tobacco Use    Smoking status: Current Every Day Smoker     Packs/day: 1.00     Years: 18.00     Pack years: 18.00     Types: Cigarettes     Start date: 1991    Smokeless tobacco: Never Used   Substance and Sexual Activity    Alcohol use: Not Currently     Alcohol/week: 70.0 standard drinks     Types: 70 Cans of beer per week     Frequency: 4 or more times a week     Drinks per session: 10 or more     Binge frequency: Monthly    Drug use: Never    Sexual activity: Yes     Partners: Female     Birth control/protection: None       Review of Systems   Constitutional: Positive for appetite change, fatigue and fever. Negative for activity change.   HENT: Negative for facial swelling and hearing loss.    Eyes: Negative for discharge and itching.   Respiratory: Negative for chest tightness and shortness of breath.    Cardiovascular: Negative for chest pain and leg swelling.   Gastrointestinal: Negative for abdominal distention, abdominal pain, constipation, diarrhea, nausea and vomiting.   Genitourinary: Negative for decreased urine volume, difficulty urinating, flank pain, frequency and hematuria.   Musculoskeletal: Negative for arthralgias, back pain and neck pain.   Skin: Negative for color change and pallor.   Neurological: Negative for dizziness, facial asymmetry and light-headedness.   Hematological: Negative for adenopathy.   Psychiatric/Behavioral: Negative for agitation and decreased concentration. The patient is not nervous/anxious.         Objective:     Temp:  [97.9 °F (36.6 °C)-100.5 °F (38.1 °C)] 98.1 °F (36.7 °C)  Pulse:  [] 78  Resp:  [14-17] 17  SpO2:  [94 %-99 %] 96 %  BP: ()/(48-78) 93/59     Body mass index is 24.25 kg/m².           Drains     Drain                 Ileostomy 12/22/19 1704 Standard ([place], end) LUQ 22 days         Urostomy 12/22/19 1654 ileal conduit RUQ 22 days                Physical Exam   Constitutional: He is oriented to person, place, and time. He appears well-developed and well-nourished. No distress.   HENT:   Head: Normocephalic and atraumatic.   Eyes: No scleral icterus.   Neck: No tracheal deviation present.   Cardiovascular: Normal rate.    Pulmonary/Chest: Effort normal. No respiratory distress.   Abdominal: Soft. He exhibits no distension. There is no tenderness.   Genitourinary:   Genitourinary Comments: No flank tenderness bilaterally   Left ileostomy with light brown sucus  Right urostomy with clear yellow urine draining, red rubber and stent in good position, stoma healthy pink   Musculoskeletal: Normal range of motion.   Neurological: He is alert and oriented to person, place, and time.   Skin: Skin is warm and dry.     Psychiatric: He has a normal mood and affect. His behavior is normal.       Significant Labs:    BMP:  Recent Labs   Lab 01/11/20  0441 01/13/20  1305 01/14/20  0344   * 121* 128*   K 4.6 5.5* 4.4    91* 97   CO2 21* 19* 20*   BUN 36* 31* 23*   CREATININE 1.3 1.9* 1.7*   CALCIUM 9.8 10.1 8.9       CBC:  Recent Labs   Lab 01/11/20  0441 01/13/20  1305 01/14/20  0344   WBC 8.37 19.14* 11.71   HGB 10.1* 10.8* 9.2*   HCT 30.9* 32.8* 27.9*   * 364* 305       All pertinent labs results from the past 24 hours have been reviewed.    Significant Imaging:  All pertinent imaging results/findings from the past 24 hours have been reviewed.  Adrenal glands: unremarkable bilaterally.   Left kidney is free of stones or masses, no hydronephrosis apparent. There is some  mild perinephric stranding appreciated. Left ureter has a normal course and caliber, with a ureteral stent in good position. There are no left ureteral stones present. Distal portion of left ureter enters ileal conduit.   Right kidney is free of stones or masses, no hydronephrosis apparent. Right ureter has a normal course and caliber. There are no right ureteral stones present. Distal portion of right ureter enters ileal conduit.   Bladder is surgically absent. Ileal conduit is decompressed with catheter in good position.

## 2020-01-14 NOTE — ED NOTES
Telemetry Verification   Patient placed on Telemetry Box  Verified with War Room  Box # [id number]   Monitor Tech [place]   Rate Sinus tach   Rhythm 106

## 2020-01-14 NOTE — PROGRESS NOTES
[facility] Hospital Medicine  Progress Note    Patient Name: Kiko Gruber  MRN: [id number]  Patient Class: IP- Inpatient   Admission Date: 1/13/2020  Length of Stay: 1 days  Attending Physician: Michelle Ray MD  Primary Care Provider: Lala Juarez MD    [facility] Medicine Team: [facility] HOSP MED D Michelle Ray MD    Chief Complaint   Patient presents with    Fever     Pt reports fever, poor appetite.  Pt s/p bowel resection 12//27.      HPI:  54 y.o. Male with metastatic prostate cancer on chemo, s/p ex lap with small bowel resection and end ileostomy placement with ureterneocystostomy on 12/22/19 recently admitted for hyponatremia, hypomagnesemia, and JORGE ALBERTO. Evaluated by Gen Surg and Urology. Stable on Loperamide and Lomotil. Hyponatremia and JORGE ALBERTO improved. Electrolyte derangement likely due to high ostomy output, goal of </= 1L ostomy output/day. Patient presented today for post-op evaluation. Denies any pain, nausea, vomiting. Reports drinking >2L of Pedialyte and Gatorade per day to stay hydrated. Denies redness around or drainage from incisions. Doing well post-operatively until last night when he developed poor appetite and decreased intake. Found to have fever in Clinic associated with tachycardia. Wife () reports that patient had fever and rigors last night but would not seek care until his appointments today. Patient was sent to ED from Clinic.    Subjective:     Principal Problem:Pyelonephritis    Interval History: Feeling better, low grade fever with downtrending fever curve. No new complaints. Feels a little down due to all the recent hospitalizations / complications. Colostomy output thin.    Review of Systems   Respiratory: Negative for shortness of breath.    Gastrointestinal: Negative for abdominal pain.     Objective:     Vital Signs (Most Recent):  Temp: 98.1 °F (36.7 °C) (01/14/20 1604)  Pulse: 78 (01/14/20 1604)  Resp: 17 (01/14/20 1604)  BP: (!) 93/59  (01/14/20 1604)  SpO2: 96 % (01/14/20 1604) Vital Signs (24h Range):  Temp:  [97.9 °F (36.6 °C)-100.5 °F (38.1 °C)] 98.1 °F (36.7 °C)  Pulse:  [] 78  Resp:  [14-17] 17  SpO2:  [94 %-99 %] 96 %  BP: ()/(48-78) 93/59     Weight: 74.5 kg (164 lb 3.9 oz)  Body mass index is 24.25 kg/m².    Intake/Output Summary (Last 24 hours) at 1/14/2020 1719  Last data filed at 1/14/2020 0600  Gross per 24 hour   Intake 1300 ml   Output 4250 ml   Net -2950 ml      Physical Exam   Constitutional: He is cooperative.   Eyes: Conjunctivae and lids are normal.   Cardiovascular: S1 normal and S2 normal.   Pulmonary/Chest: Effort normal and breath sounds normal.   Abdominal: Soft. Bowel sounds are normal. There is no tenderness.   Ileostomy, colostomy   Musculoskeletal: He exhibits no edema.   Neurological: He is alert. He is not disoriented.     Significant Labs:     Blood Culture:   Recent Labs   Lab 01/13/20  1256 01/13/20  1306   LABBLOO No Growth to date  No Growth to date No Growth to date  No Growth to date     CBC:   Recent Labs   Lab 01/13/20  1305 01/14/20  0344   WBC 19.14* 11.71   HGB 10.8* 9.2*   HCT 32.8* 27.9*   * 305     CMP:   Recent Labs   Lab 01/13/20  1305 01/14/20  0344   * 128*   K 5.5* 4.4   CL 91* 97   CO2 19* 20*   GLU 89 111*   BUN 31* 23*   CREATININE 1.9* 1.7*   CALCIUM 10.1 8.9   PROT 7.8 6.0   ALBUMIN 3.2* 2.4*   BILITOT 0.4 0.2   ALKPHOS 131 101   AST 15 9*   ALT 40 25   ANIONGAP 11 11   EGFRNONAA 38.8* 44.4*     Lactic Acid:   Recent Labs   Lab 01/13/20  1305 01/13/20  1618   LACTATE 1.2 1.1     Lipase:   Recent Labs   Lab 01/13/20  1305   LIPASE 31     Magnesium:   Recent Labs   Lab 01/13/20  1305 01/14/20  0344   MG 1.0* 2.1     Troponin:   Recent Labs   Lab 01/13/20  1305   TROPONINI <0.006     Urine Culture:   Recent Labs   Lab 01/13/20  1441   LABURIN Multiple organisms isolated. None in predominance.  Repeat if  clinically necessary.     Urine Studies:   Recent Labs   Lab  01/13/20  1441   COLORU Yellow   APPEARANCEUA Hazy*   PHUR 6.0   SPECGRAV 1.005   PROTEINUA 1+*   GLUCUA Negative   KETONESU Negative   BILIRUBINUA Negative   OCCULTUA 2+*   NITRITE Negative   LEUKOCYTESUR 2+*   RBCUA 3   WBCUA 29*   BACTERIA Many*   HYALINECASTS 0       Significant Imaging: I have reviewed all pertinent imaging results/findings within the past 24 hours.  CT A/P 1/13: Moderate degree of perinephric stranding around the left kidney with striated nephrograms on excretory phase most suggestive of acute pyelonephritis.  Consider correlation with urinalysis and urine cultures.  Status post cystectomy, prostatectomy, partial small-bowel resection with creation of ileal conduit and ureteroneocystostomy in this patient with history of prostate cancer.  Double-J stent noted within the left renal pelvis extending through the ileal conduit and is external to the patient along with another catheter within the ileal conduit itself extending externally.  Minimal right-sided hydronephrosis with minimal dilatation of the proximal right ureter.  Large L4 sclerotic lesion, concerning for metastatic disease.  Correlation with prior examinations and bone scan if available is recommended.    Assessment/Plan:      Active Diagnoses:    Diagnosis Date Noted POA    PRINCIPAL PROBLEM:  Pyelonephritis [N12] 01/13/2020 Yes    Sepsis [A41.9] 01/13/2020 Yes    Prostate cancer [C61] 01/06/2020 Yes    JORGE ALBERTO (acute kidney injury) [N17.9] 01/06/2020 Yes    Nicotine dependence [F17.200] 12/30/2019 Yes    Hypomagnesemia [E83.42] 12/30/2019 Yes    Hyponatremia [E87.1] 12/30/2019 Yes      Problems Resolved During this Admission:     Overview / Hospital Course:       Inpatient Medications prescribed for management of Current Problems:   Scheduled Meds:    diphenoxylate-atropine 2.5-0.025 mg  2 tablet Oral BID    enoxaparin  40 mg Subcutaneous Daily    loperamide  4 mg Oral QID    magnesium sulfate IVPB  2 g Intravenous Daily     nicotine  1 patch Transdermal Daily    pantoprazole  40 mg Oral QHS    piperacillin-tazobactam (ZOSYN) IVPB  4.5 g Intravenous Q8H    predniSONE  5 mg Oral BID    scopolamine  1 patch Transdermal Q3 Days     Continuous Infusions:    sodium chloride 0.9% 100 mL/hr at 01/14/20 0910     As Needed: acetaminophen, GI cocktail (mylanta 30 mL, lidocaine 2 % viscous 10 mL, dicyclomine 10 mL) 50 mL, miconazole NITRATE 2 %, oxyCODONE, sodium chloride 0.9%    Assessment and Plan by Problem     Sepsis due to Pyelonephritis  Febrile, tachycardic. No significant hypotension.  Received 2.2 L IV fluids in ED. Zosyn started.  Blood culture NGTD and urine culture inconclusive with multiple organisms found.  Clinically improving with Zosyn.      JORGE ALBERTO  Baseline sCr 1.0 - 1.4.  Continue NS for now  Slightly improved but not back to sCr baseline      Hyponatremia  Recurrent. Continuing NS.  Improving      Hypomagnesemia  Chronic and severe.   Repletion IV for now as oral MagOx expected to increase colostomy output.      Prostate cancer  Plans to resume chemo after infection clears.     S/p small bowel resection and end ileostomy placement with ureteroneocystostomy  Continuing current management with Lomotil and Imodium per previous Surgery recommendations.       Nicotine dependence  Recommend cessation.    Diet Adult Regular (IDDSI Level 7) [facility]    Significant LDAs:     HIGH RISK CONDITION(S):   Patient has a condition that poses threat to life and bodily function: Acute Renal Failure     Discharge plan and follow up  Home or Self Care    VTE Risk Mitigation (From admission, onward)         Ordered     enoxaparin injection 40 mg  Daily      01/13/20 6783                   Michelle Ray MD  Department of Hospital Medicine   [facility]

## 2020-01-14 NOTE — CONSULTS
[facility]  Urology  Consult Note    Patient Name: Kiko Gruber  MRN: [id number]  Admission Date: 1/13/2020  Hospital Length of Stay: 1   Code Status: Full Code   Attending Provider: Michelle Ray MD   Consulting Provider: Nita Cardoza MD  Primary Care Physician: Lala Juarez MD  Principal Problem:Pyelonephritis    Inpatient consult to Urology  Consult performed by: Nita Cardoza MD  Consult ordered by: Michelle Ray MD          Subjective:     HPI:  This is a 54 YOM with history of metastatic prostate CA with mets to small bowel s/p carmen orchiectomy, and bladder CA s/p cystoprostatectomy with creation of ileal conduit. He was admitted in 12/22/19 for SBO and underwent ex-lap with left ureteral resection requiring ureteral re-implant into the conduit.      He was previously admitted on 1/7/20 with weakness, fatigue, decreased PO intake, and increased ileostomy output. He was found to have an JORGE ALBERTO and was profoundly hyponatremic. He was fluid resuscitated and did well. CT at that time showed a left ureteral stent traversing the new anastomosis. Urology was consulted for management of indwelling red rubber catheter and stent - red rubber catheter was reinserted and left in place.  He was discharged on 1/11.    He presented to his PCP clinic appointment yesterday (1/13) c/o fever, lethargy, and decreased PO intake. He was admitted from clinic for an evaluation. On admission, he was febrile to 101.2, had leukocytosis WBC 19, and JORGE ALBERTO with Cr 1.9 (baseline 1.0-1.3). He was borderline hypotensive with BP 86/60 - 125/78. His urine showed 3R, 29W, many bacteria and was nitrite neg. Urine and blood cultures are currently pending. He was started on Zosyn for presumed pyelonephritis and urology was consulted for assistance. His latest CT scan shows no hydronephrosis bilaterally, mild left perinephric stranding, and a decompressed conduit. His left ureteral stent still remains in  place across the anastomosis and the red rubber catheter is in the correct position.      Overnight, his ileostomy put out over 2L. The next day, his WBC improved to 11, and his Cr improved to 1.7.     On evaluation, he is feeling much better. He has remained afebrile today. He states that his ileostomy output only increases when he is in the hospital. At home, it does not drain as much. He denies any flank tenderness, or tenderness surrounding his ostomy sites. He reports making a conscious effort to ensure his urine in draining to gravity.          Past Medical History:   Diagnosis Date    Cancer     Prostate    Hypertension        Past Surgical History:   Procedure Laterality Date    ABDOMINAL SURGERY      REIMPLANT URETER IN BLADDER Left 12/22/2019    Procedure: URETERONEOCYSTOSTOMY reimplant to conduit;  Surgeon: Manish Styles MD;  Location: [facility] OR [address] [place] [address];  Service: Urology;  Laterality: Left;       Review of patient's allergies indicates:  No Known Allergies    Family History     None          Tobacco Use    Smoking status: Current Every Day Smoker     Packs/day: 1.00     Years: 18.00     Pack years: 18.00     Types: Cigarettes     Start date: 1991    Smokeless tobacco: Never Used   Substance and Sexual Activity    Alcohol use: Not Currently     Alcohol/week: 70.0 standard drinks     Types: 70 Cans of beer per week     Frequency: 4 or more times a week     Drinks per session: 10 or more     Binge frequency: Monthly    Drug use: Never    Sexual activity: Yes     Partners: Female     Birth control/protection: None       Review of Systems   Constitutional: Positive for appetite change, fatigue and fever. Negative for activity change.   HENT: Negative for facial swelling and hearing loss.    Eyes: Negative for discharge and itching.   Respiratory: Negative for chest tightness and shortness of breath.    Cardiovascular: Negative for chest pain and leg swelling.   Gastrointestinal: Negative for  abdominal distention, abdominal pain, constipation, diarrhea, nausea and vomiting.   Genitourinary: Negative for decreased urine volume, difficulty urinating, flank pain, frequency and hematuria.   Musculoskeletal: Negative for arthralgias, back pain and neck pain.   Skin: Negative for color change and pallor.   Neurological: Negative for dizziness, facial asymmetry and light-headedness.   Hematological: Negative for adenopathy.   Psychiatric/Behavioral: Negative for agitation and decreased concentration. The patient is not nervous/anxious.        Objective:     Temp:  [97.9 °F (36.6 °C)-100.5 °F (38.1 °C)] 98.1 °F (36.7 °C)  Pulse:  [] 78  Resp:  [14-17] 17  SpO2:  [94 %-99 %] 96 %  BP: ()/(48-78) 93/59     Body mass index is 24.25 kg/m².           Drains     Drain                 Ileostomy 12/22/19 1704 Standard ([place], end) LUQ 22 days         Urostomy 12/22/19 1654 ileal conduit RUQ 22 days                Physical Exam   Constitutional: He is oriented to person, place, and time. He appears well-developed and well-nourished. No distress.   HENT:   Head: Normocephalic and atraumatic.   Eyes: No scleral icterus.   Neck: No tracheal deviation present.   Cardiovascular: Normal rate.    Pulmonary/Chest: Effort normal. No respiratory distress.   Abdominal: Soft. He exhibits no distension. There is no tenderness.   Genitourinary:   Genitourinary Comments: No flank tenderness bilaterally   Left ileostomy with light brown sucus  Right urostomy with clear yellow urine draining, red rubber and stent in good position, stoma healthy pink   Musculoskeletal: Normal range of motion.   Neurological: He is alert and oriented to person, place, and time.   Skin: Skin is warm and dry.     Psychiatric: He has a normal mood and affect. His behavior is normal.       Significant Labs:    BMP:  Recent Labs   Lab 01/11/20  0441 01/13/20  1305 01/14/20  0344   * 121* 128*   K 4.6 5.5* 4.4    91* 97   CO2 21* 19* 20*    BUN 36* 31* 23*   CREATININE 1.3 1.9* 1.7*   CALCIUM 9.8 10.1 8.9       CBC:  Recent Labs   Lab 01/11/20  0441 01/13/20  1305 01/14/20  0344   WBC 8.37 19.14* 11.71   HGB 10.1* 10.8* 9.2*   HCT 30.9* 32.8* 27.9*   * 364* 305       All pertinent labs results from the past 24 hours have been reviewed.    Significant Imaging:  All pertinent imaging results/findings from the past 24 hours have been reviewed.  Adrenal glands: unremarkable bilaterally.   Left kidney is free of stones or masses, no hydronephrosis apparent. There is some mild perinephric stranding appreciated. Left ureter has a normal course and caliber, with a ureteral stent in good position. There are no left ureteral stones present. Distal portion of left ureter enters ileal conduit.   Right kidney is free of stones or masses, no hydronephrosis apparent. Right ureter has a normal course and caliber. There are no right ureteral stones present. Distal portion of right ureter enters ileal conduit.   Bladder is surgically absent. Ileal conduit is decompressed with catheter in good position.                       Assessment and Plan:     * Pyelonephritis  54 YOM with history of metastatic prostate CA with mets to small bowel s/p carmen orchiectomy, and bladder CA s/p cystoprostatectomy with creation of ileal conduit. He was admitted in 12/22/19 for SBO and underwent ex-lap with left ureteral resection requiring ureteral re-implant into the conduit.  -- presented initially with fever, leukocytosis, and left perinephric stranding, concerning for pyelonephritis     -- leukocytosis and fevers improving on zosyn  -- tailor abx according to results of urine cx   -- strict I/O  -- OK to remove red rubber catheter at time of discharge   -- trend Cr  -- if patient does not continue to improve in 48 hours, recommend re-scanning         VTE Risk Mitigation (From admission, onward)         Ordered     enoxaparin injection 40 mg  Daily      01/13/20 8795                 Nita Cardoza MD  Urology  [facility]

## 2020-01-14 NOTE — PLAN OF CARE
CM met with patient and family at bedside. CM explained the role of the CM/SW in assisting with discharge planning. Information in medical records verified with patient. Patient lives with sister in one story home with three steps at the entrance. No recommendations at this time. Patient in agreement that he will discharge under the care of himself and family.  CM name and number on the board.       PCP: Lala Juarez MD      PHARM:   [id number] - [place], [place] - [address] AT [facility] [address]  [address]  [facility] [address]  Phone: [phone] Fax: [phone]    [id number] - [place], [place] - [address] [place] [address] AT [facility] [address] [facility]  [address] [facility] [address]  Phone: [phone] Fax: [phone]        Payor: MEDICAID / Plan: [facility] / Product Type: Managed Medicaid /            01/14/20 1310   Discharge Assessment   Assessment Type Discharge Planning Assessment   Confirmed/corrected address and phone number on facesheet? Yes   Assessment information obtained from? Patient;Medical Record   Expected Length of Stay (days) 4   Communicated expected length of stay with patient/caregiver yes   Prior to hospitilization cognitive status: Alert/Oriented;No Deficits   Prior to hospitalization functional status: Independent   Current cognitive status: Alert/Oriented;No Deficits   Lives With sibling(s)   Able to Return to Prior Arrangements yes   Is patient able to care for self after discharge? Yes   Patient's perception of discharge disposition home or selfcare   Patient currently being followed by outpatient case management? No   Patient currently receives any other outside agency services? No   Equipment Currently Used at Home colostomy/ostomy supplies   Do you have any problems affording any of your prescribed medications? No   Is the patient taking medications as prescribed?  yes   Does the patient have transportation home? Yes   Transportation Anticipated family or friend will provide   Does the patient receive services at the Coumadin Clinic? No   Discharge Plan A Home with family;Home   Discharge Plan B Home;Home with family   DME Needed Upon Discharge  none   Patient/Family in Agreement with Plan yes

## 2020-01-14 NOTE — ASSESSMENT & PLAN NOTE
54 YOM with history of metastatic prostate CA with mets to small bowel s/p carmen orchiectomy, and bladder CA s/p cystoprostatectomy with creation of ileal conduit. He was admitted in 12/22/19 for SBO and underwent ex-lap with left ureteral resection requiring ureteral re-implant into the conduit.  -- presented initially with fever, leukocytosis, and left perinephric stranding, concerning for pyelonephritis     -- leukocytosis and fevers improving on zosyn  -- tailor abx according to results of urine cx   -- strict I/O  -- OK to remove red rubber catheter at time of discharge   -- trend Cr  -- if patient does not continue to improve in 48 hours, recommend re-scanning

## 2020-01-14 NOTE — HPI
This is a 54 YOM with history of metastatic prostate CA with mets to small bowel s/p carmen orchiectomy, and bladder CA s/p cystoprostatectomy with creation of ileal conduit. He was admitted in 12/22/19 for SBO and underwent ex-lap with left ureteral resection requiring ureteral re-implant into the conduit.      He was previously admitted on 1/7/20 with weakness, fatigue, decreased PO intake, and increased ileostomy output. He was found to have an JORGE ALBERTO and was profoundly hyponatremic. He was fluid resuscitated and did well. CT at that time showed a left ureteral stent traversing the new anastomosis. Urology was consulted for management of indwelling red rubber catheter and stent - red rubber catheter was reinserted and left in place.  He was discharged on 1/11.    He presented to his PCP clinic appointment yesterday (1/13) c/o fever, lethargy, and decreased PO intake. He was admitted from clinic for an evaluation. On admission, he was febrile to 101.2, had leukocytosis WBC 19, and JORGE ALBERTO with Cr 1.9 (baseline 1.0-1.3). He was borderline hypotensive with BP 86/60 - 125/78. His urine showed 3R, 29W, many bacteria and was nitrite neg. Urine and blood cultures are currently pending. He was started on Zosyn for presumed pyelonephritis and urology was consulted for assistance. His latest CT scan shows no hydronephrosis bilaterally, mild left perinephric stranding, and a decompressed conduit. His left ureteral stent still remains in place across the anastomosis and the red rubber catheter is in the correct position.      Overnight, his ileostomy put out over 2L. The next day, his WBC improved to 11, and his Cr improved to 1.7.     On evaluation, he is feeling much better. He has remained afebrile today. He states that his ileostomy output only increases when he is in the hospital. At home, it does not drain as much. He denies any flank tenderness, or tenderness surrounding his ostomy sites. He reports making a conscious effort to  ensure his urine in draining to gravity.

## 2020-01-14 NOTE — PLAN OF CARE
01/14/20 1339   Post-Acute Status   Post-Acute Authorization Other   Other Status No Post-Acute Service Needs

## 2020-01-14 NOTE — PROGRESS NOTES
Wound care consult for ostomy care.     Patient well known to team from multiple admissions over past few weeks.     Patient ostomy appliance full. Patient states he was placed in convexity yesterday but leaked overnight and changed. Patient request ileostomy change to ensure it was cut to right size.   Appliance removed, peristomal skin improving. Powder and spray applied. Stoma approx 25mm, os at skin level. Patient placed in 1 piece flat pouch as he was getting a longer wear time. Will continue to monitor peristomal skin.    Urostomy attached to drainage bag. Pouch intact and states it was change less than 2 days ago.     Patient able to manage pouches independently. Will assist while inpatient.   Blanquita Dubois RN CWCN CN   [phone]

## 2020-01-14 NOTE — PLAN OF CARE
Recommendations    Recommendation:   1. Continue regular diet, encourage good PO intake >50% of meals, if needed change to bland diet for pt to tolerate better   2. Add Sotero BID and Promod TID to increase intake   3. Add [employer] ONS when available   4. Add MVI   RD to monitor and follow up     Goals: pt to consume >50% of EEN/EPN by RD follow up   Nutrition Goal Status: new  Communication of RD Recs: other (comment)(POC)

## 2020-01-15 LAB
ALBUMIN SERPL BCP-MCNC: 2.4 G/DL (ref 3.5–5.2)
ALP SERPL-CCNC: 91 U/L (ref 55–135)
ALT SERPL W/O P-5'-P-CCNC: 21 U/L (ref 10–44)
ANION GAP SERPL CALC-SCNC: 9 MMOL/L (ref 8–16)
AST SERPL-CCNC: 8 U/L (ref 10–40)
BASOPHILS # BLD AUTO: 0.03 K/UL (ref 0–0.2)
BASOPHILS NFR BLD: 0.5 % (ref 0–1.9)
BILIRUB SERPL-MCNC: 0.2 MG/DL (ref 0.1–1)
BUN SERPL-MCNC: 17 MG/DL (ref 6–20)
CALCIUM SERPL-MCNC: 8.9 MG/DL (ref 8.7–10.5)
CHLORIDE SERPL-SCNC: 99 MMOL/L (ref 95–110)
CO2 SERPL-SCNC: 19 MMOL/L (ref 23–29)
CREAT SERPL-MCNC: 1.4 MG/DL (ref 0.5–1.4)
DIFFERENTIAL METHOD: ABNORMAL
EOSINOPHIL # BLD AUTO: 0 K/UL (ref 0–0.5)
EOSINOPHIL NFR BLD: 0.6 % (ref 0–8)
ERYTHROCYTE [DISTWIDTH] IN BLOOD BY AUTOMATED COUNT: 14 % (ref 11.5–14.5)
EST. GFR  (AFRICAN AMERICAN): >60 ML/MIN/1.73 M^2
EST. GFR  (NON AFRICAN AMERICAN): 56.2 ML/MIN/1.73 M^2
GLUCOSE SERPL-MCNC: 126 MG/DL (ref 70–110)
HCT VFR BLD AUTO: 23.6 % (ref 40–54)
HGB BLD-MCNC: 8 G/DL (ref 14–18)
IMM GRANULOCYTES # BLD AUTO: 0.09 K/UL (ref 0–0.04)
IMM GRANULOCYTES NFR BLD AUTO: 1.4 % (ref 0–0.5)
LYMPHOCYTES # BLD AUTO: 0.7 K/UL (ref 1–4.8)
LYMPHOCYTES NFR BLD: 10.5 % (ref 18–48)
MAGNESIUM SERPL-MCNC: 1.8 MG/DL (ref 1.6–2.6)
MCH RBC QN AUTO: 29.9 PG (ref 27–31)
MCHC RBC AUTO-ENTMCNC: 33.9 G/DL (ref 32–36)
MCV RBC AUTO: 88 FL (ref 82–98)
MONOCYTES # BLD AUTO: 0.6 K/UL (ref 0.3–1)
MONOCYTES NFR BLD: 9.2 % (ref 4–15)
NEUTROPHILS # BLD AUTO: 5 K/UL (ref 1.8–7.7)
NEUTROPHILS NFR BLD: 77.8 % (ref 38–73)
NRBC BLD-RTO: 0 /100 WBC
PHOSPHATE SERPL-MCNC: 4.5 MG/DL (ref 2.7–4.5)
PLATELET # BLD AUTO: 276 K/UL (ref 150–350)
PMV BLD AUTO: 9.3 FL (ref 9.2–12.9)
POTASSIUM SERPL-SCNC: 4.1 MMOL/L (ref 3.5–5.1)
PROT SERPL-MCNC: 6.1 G/DL (ref 6–8.4)
RBC # BLD AUTO: 2.68 M/UL (ref 4.6–6.2)
SODIUM SERPL-SCNC: 127 MMOL/L (ref 136–145)
WBC # BLD AUTO: 6.39 K/UL (ref 3.9–12.7)

## 2020-01-15 PROCEDURE — 80053 COMPREHEN METABOLIC PANEL: CPT

## 2020-01-15 PROCEDURE — 36415 COLL VENOUS BLD VENIPUNCTURE: CPT

## 2020-01-15 PROCEDURE — 63600175 PHARM REV CODE 636 W HCPCS: Performed by: INTERNAL MEDICINE

## 2020-01-15 PROCEDURE — 99232 PR SUBSEQUENT HOSPITAL CARE,LEVL II: ICD-10-PCS | Mod: ,,, | Performed by: INTERNAL MEDICINE

## 2020-01-15 PROCEDURE — 99232 SBSQ HOSP IP/OBS MODERATE 35: CPT | Mod: ,,, | Performed by: INTERNAL MEDICINE

## 2020-01-15 PROCEDURE — S4991 NICOTINE PATCH NONLEGEND: HCPCS | Performed by: PHYSICIAN ASSISTANT

## 2020-01-15 PROCEDURE — 83735 ASSAY OF MAGNESIUM: CPT

## 2020-01-15 PROCEDURE — 85025 COMPLETE CBC W/AUTO DIFF WBC: CPT

## 2020-01-15 PROCEDURE — 84100 ASSAY OF PHOSPHORUS: CPT

## 2020-01-15 PROCEDURE — 25000003 PHARM REV CODE 250: Performed by: PHYSICIAN ASSISTANT

## 2020-01-15 PROCEDURE — 11000001 HC ACUTE MED/SURG PRIVATE ROOM

## 2020-01-15 PROCEDURE — 25000003 PHARM REV CODE 250: Performed by: INTERNAL MEDICINE

## 2020-01-15 RX ADMIN — SODIUM CHLORIDE: 0.9 INJECTION, SOLUTION INTRAVENOUS at 11:01

## 2020-01-15 RX ADMIN — SCOPALAMINE 1 PATCH: 1 PATCH, EXTENDED RELEASE TRANSDERMAL at 12:01

## 2020-01-15 RX ADMIN — PIPERACILLIN AND TAZOBACTAM 4.5 G: 4; .5 INJECTION, POWDER, FOR SOLUTION INTRAVENOUS at 09:01

## 2020-01-15 RX ADMIN — PIPERACILLIN AND TAZOBACTAM 4.5 G: 4; .5 INJECTION, POWDER, FOR SOLUTION INTRAVENOUS at 06:01

## 2020-01-15 RX ADMIN — LOPERAMIDE HYDROCHLORIDE 4 MG: 2 CAPSULE ORAL at 09:01

## 2020-01-15 RX ADMIN — MAGNESIUM SULFATE IN WATER 2 G: 40 INJECTION, SOLUTION INTRAVENOUS at 09:01

## 2020-01-15 RX ADMIN — PREDNISONE 5 MG: 5 TABLET ORAL at 09:01

## 2020-01-15 RX ADMIN — DIPHENOXYLATE HYDROCHLORIDE AND ATROPINE SULFATE 2 TABLET: 2.5; .025 TABLET ORAL at 09:01

## 2020-01-15 RX ADMIN — SODIUM CHLORIDE: 0.9 INJECTION, SOLUTION INTRAVENOUS at 09:01

## 2020-01-15 RX ADMIN — Medication 1 PATCH: at 09:01

## 2020-01-15 RX ADMIN — PIPERACILLIN AND TAZOBACTAM 4.5 G: 4; .5 INJECTION, POWDER, FOR SOLUTION INTRAVENOUS at 12:01

## 2020-01-15 RX ADMIN — ENOXAPARIN SODIUM 40 MG: 100 INJECTION SUBCUTANEOUS at 06:01

## 2020-01-15 RX ADMIN — LOPERAMIDE HYDROCHLORIDE 4 MG: 2 CAPSULE ORAL at 02:01

## 2020-01-15 RX ADMIN — PANTOPRAZOLE SODIUM 40 MG: 40 TABLET, DELAYED RELEASE ORAL at 09:01

## 2020-01-15 RX ADMIN — OXYCODONE HYDROCHLORIDE 5 MG: 5 TABLET ORAL at 09:01

## 2020-01-15 NOTE — PLAN OF CARE
01/15/20 0846   Post-Acute Status   Post-Acute Authorization Other   Other Status No Post-Acute Service Needs

## 2020-01-15 NOTE — PROGRESS NOTES
[facility] Hospital Medicine  Progress Note    Patient Name: Kiko Gruber  MRN: [id number]  Patient Class: IP- Inpatient   Admission Date: 1/13/2020  Length of Stay: 2 days  Attending Physician: Juan J Marcum MD  Primary Care Provider: Lala Juarez MD    [facility] Medicine Team: [facility] [place] [facility] MED D Michelle Ray MD    Chief Complaint   Patient presents with    Fever     Pt reports fever, poor appetite.  Pt s/p bowel resection 12//27.      HPI:  54 y.o. Male with metastatic prostate cancer on chemo, s/p ex lap with small bowel resection and end ileostomy placement with ureterneocystostomy on 12/22/19 recently admitted for hyponatremia, hypomagnesemia, and JORGE ALBERTO. Evaluated by Gen Surg and Urology. Stable on Loperamide and Lomotil. Hyponatremia and JORGE ALBERTO improved. Electrolyte derangement likely due to high ostomy output, goal of </= 1L ostomy output/day. Patient presented today for post-op evaluation. Denies any pain, nausea, vomiting. Reports drinking >2L of Pedialyte and Gatorade per day to stay hydrated. Denies redness around or drainage from incisions. Doing well post-operatively until last night when he developed poor appetite and decreased intake. Found to have fever in Clinic associated with tachycardia. Wife () reports that patient had fever and rigors last night but would not seek care until his appointments today. Patient was sent to ED from Clinic.    Subjective:     Principal Problem:Pyelonephritis    Interval History: Patient lying in bed, no acute distress. Reports feeling better today. Denies fever, chills, abdominal pain or nausea/vomiting. He reports normal output from ileostomy and colostomy bag.     Review of Systems   Constitutional: Negative for chills and fever.   Respiratory: Negative for shortness of breath.    Gastrointestinal: Negative for abdominal pain.   Musculoskeletal: Negative for myalgias.   Neurological: Negative for dizziness and weakness.      Objective:     Vital Signs (Most Recent):  Temp: 96.7 °F (35.9 °C) (01/15/20 0756)  Pulse: 93 (01/15/20 0805)  Resp: 18 (01/15/20 0756)  BP: 104/65 (01/15/20 0756)  SpO2: 99 % (01/15/20 0756) Vital Signs (24h Range):  Temp:  [96.7 °F (35.9 °C)-98.3 °F (36.8 °C)] 96.7 °F (35.9 °C)  Pulse:  [66-93] 93  Resp:  [14-18] 18  SpO2:  [95 %-99 %] 99 %  BP: ()/(57-66) 104/65     Weight: 74.5 kg (164 lb 3.9 oz)  Body mass index is 24.25 kg/m².    Intake/Output Summary (Last 24 hours) at 1/15/2020 0836  Last data filed at 1/15/2020 0400  Gross per 24 hour   Intake --   Output 925 ml   Net -925 ml      Physical Exam   Constitutional: He is cooperative.   Eyes: Conjunctivae and lids are normal.   Cardiovascular: S1 normal and S2 normal.   Pulmonary/Chest: Effort normal and breath sounds normal.   Abdominal: Soft. Bowel sounds are normal. There is no tenderness.   Ileostomy, colostomy   Musculoskeletal: He exhibits no edema.   Neurological: He is alert. He is not disoriented.     Significant Labs:     Blood Culture:   Recent Labs   Lab 01/13/20  1256 01/13/20  1306   LABBLOO No Growth to date  No Growth to date No Growth to date  No Growth to date     CBC:   Recent Labs   Lab 01/13/20  1305 01/14/20  0344 01/15/20  0503   WBC 19.14* 11.71 6.39   HGB 10.8* 9.2* 8.0*   HCT 32.8* 27.9* 23.6*   * 305 276     CMP:   Recent Labs   Lab 01/13/20  1305 01/14/20  0344 01/15/20  0503   * 128* 127*   K 5.5* 4.4 4.1   CL 91* 97 99   CO2 19* 20* 19*   GLU 89 111* 126*   BUN 31* 23* 17   CREATININE 1.9* 1.7* 1.4   CALCIUM 10.1 8.9 8.9   PROT 7.8 6.0 6.1   ALBUMIN 3.2* 2.4* 2.4*   BILITOT 0.4 0.2 0.2   ALKPHOS 131 101 91   AST 15 9* 8*   ALT 40 25 21   ANIONGAP 11 11 9   EGFRNONAA 38.8* 44.4* 56.2*     Lactic Acid:   Recent Labs   Lab 01/13/20  1305 01/13/20  1618   LACTATE 1.2 1.1     Lipase:   Recent Labs   Lab 01/13/20  1305   LIPASE 31     Magnesium:   Recent Labs   Lab 01/13/20  1305 01/14/20  0344 01/15/20  0503    MG 1.0* 2.1 1.8     Troponin:   Recent Labs   Lab 01/13/20  1305   TROPONINI <0.006     Urine Culture:   Recent Labs   Lab 01/13/20  1441   LABURIN Multiple organisms isolated. None in predominance.  Repeat if  clinically necessary.     Urine Studies:   Recent Labs   Lab 01/13/20  1441   COLORU Yellow   APPEARANCEUA Hazy*   PHUR 6.0   SPECGRAV 1.005   PROTEINUA 1+*   GLUCUA Negative   KETONESU Negative   BILIRUBINUA Negative   OCCULTUA 2+*   NITRITE Negative   LEUKOCYTESUR 2+*   RBCUA 3   WBCUA 29*   BACTERIA Many*   HYALINECASTS 0       Significant Imaging: I have reviewed all pertinent imaging results/findings within the past 24 hours.     CT A/P 1/13: Moderate degree of perinephric stranding around the left kidney with striated nephrograms on excretory phase most suggestive of acute pyelonephritis.  Consider correlation with urinalysis and urine cultures.  Status post cystectomy, prostatectomy, partial small-bowel resection with creation of ileal conduit and ureteroneocystostomy in this patient with history of prostate cancer.  Double-J stent noted within the left renal pelvis extending through the ileal conduit and is external to the patient along with another catheter within the ileal conduit itself extending externally.  Minimal right-sided hydronephrosis with minimal dilatation of the proximal right ureter.  Large L4 sclerotic lesion, concerning for metastatic disease.  Correlation with prior examinations and bone scan if available is recommended.    Assessment/Plan:      Active Diagnoses:    Diagnosis Date Noted POA    PRINCIPAL PROBLEM:  Pyelonephritis [N12] 01/13/2020 Yes    Sepsis [A41.9] 01/13/2020 Yes    Prostate cancer [C61] 01/06/2020 Yes    JORGE ALBERTO (acute kidney injury) [N17.9] 01/06/2020 Yes    Nicotine dependence [F17.200] 12/30/2019 Yes    Hypomagnesemia [E83.42] 12/30/2019 Yes    Hyponatremia [E87.1] 12/30/2019 Yes      Problems Resolved During this Admission:     Overview / Hospital  Course:       Inpatient Medications prescribed for management of Current Problems:   Scheduled Meds:    diphenoxylate-atropine 2.5-0.025 mg  2 tablet Oral BID    enoxaparin  40 mg Subcutaneous Daily    loperamide  4 mg Oral QID    magnesium sulfate IVPB  2 g Intravenous Daily    nicotine  1 patch Transdermal Daily    pantoprazole  40 mg Oral QHS    piperacillin-tazobactam (ZOSYN) IVPB  4.5 g Intravenous Q8H    predniSONE  5 mg Oral BID    scopolamine  1 patch Transdermal Q3 Days     Continuous Infusions:    sodium chloride 0.9% 100 mL/hr at 01/14/20 0910     As Needed: acetaminophen, GI cocktail (mylanta 30 mL, lidocaine 2 % viscous 10 mL, dicyclomine 10 mL) 50 mL, miconazole NITRATE 2 %, oxyCODONE, sodium chloride 0.9%    Assessment and Plan:    Sepsis due to Pyelonephritis  - On admission, Febrile, tachycardic. No significant hypotension.  - Received 2.2 L IV fluids in ED. Zosyn started.  - Blood culture NGTD and urine culture inconclusive with multiple organisms found.  - Clinically improving with Zosyn.     JORGE ALBERTO  - Baseline sCr 1.0 - 1.4.  - Continue NS for now  - back to sCr baseline     Hyponatremia  - Recurrent. Continuing NS.  - Improving     Hypomagnesemia  - Chronic and severe.   - Repletion IV for now as oral MagOx expected to increase colostomy output.     Prostate cancer  - Plans to resume chemo after infection clears.    S/p small bowel resection and end ileostomy placement with ureteroneocystostomy  - Continuing current management with Lomotil and Imodium per previous Surgery recommendations.      Nicotine dependence  - Recommend cessation.    Diet Adult Regular (IDDSI Level 7) [facility]    Significant LDAs:     HIGH RISK CONDITION(S):   Patient has a condition that poses threat to life and bodily function: Acute Renal Failure     Discharge plan and follow up  Home or Self Care    VTE Risk Mitigation (From admission, onward)         Ordered     enoxaparin injection 40 mg  Daily       01/13/20 7000              Time spent in care of patient: > 35 minutes     Juan J Marcum MD  Department of [facility] Medicine   [facility]

## 2020-01-15 NOTE — PROGRESS NOTES
Urostomy and ileostomy appliance change.     Ileostomy pouch had leaked due to high output in bag. Peristomal skin improved. New pouch applied.     Urostomy with stent and red rubber in place. Pouch removed and skin cleansed. New pouch applied.     Wound care to follow PRN.  Blanquita Dubois RN [facility]   [phone]

## 2020-01-16 ENCOUNTER — TELEPHONE (OUTPATIENT)
Dept: UROLOGY | Facility: CLINIC | Age: 55
End: 2020-01-16

## 2020-01-16 VITALS
RESPIRATION RATE: 18 BRPM | OXYGEN SATURATION: 100 % | TEMPERATURE: 97 F | DIASTOLIC BLOOD PRESSURE: 71 MMHG | WEIGHT: 164.25 LBS | SYSTOLIC BLOOD PRESSURE: 110 MMHG | HEART RATE: 60 BPM | BODY MASS INDEX: 24.33 KG/M2 | HEIGHT: 69 IN

## 2020-01-16 PROBLEM — E87.1 HYPONATREMIA: Status: RESOLVED | Noted: 2019-12-30 | Resolved: 2020-01-16

## 2020-01-16 PROBLEM — A41.9 SEPSIS: Status: RESOLVED | Noted: 2020-01-13 | Resolved: 2020-01-16

## 2020-01-16 PROBLEM — N17.9 AKI (ACUTE KIDNEY INJURY): Status: RESOLVED | Noted: 2020-01-06 | Resolved: 2020-01-16

## 2020-01-16 PROBLEM — N12 PYELONEPHRITIS: Status: RESOLVED | Noted: 2020-01-13 | Resolved: 2020-01-16

## 2020-01-16 PROBLEM — E83.42 HYPOMAGNESEMIA: Status: RESOLVED | Noted: 2019-12-30 | Resolved: 2020-01-16

## 2020-01-16 LAB
ALBUMIN SERPL BCP-MCNC: 2.3 G/DL (ref 3.5–5.2)
ALP SERPL-CCNC: 80 U/L (ref 55–135)
ALT SERPL W/O P-5'-P-CCNC: 19 U/L (ref 10–44)
ANION GAP SERPL CALC-SCNC: 10 MMOL/L (ref 8–16)
AST SERPL-CCNC: 8 U/L (ref 10–40)
BASOPHILS # BLD AUTO: 0.03 K/UL (ref 0–0.2)
BASOPHILS NFR BLD: 0.4 % (ref 0–1.9)
BILIRUB SERPL-MCNC: <0.1 MG/DL (ref 0.1–1)
BUN SERPL-MCNC: 19 MG/DL (ref 6–20)
CALCIUM SERPL-MCNC: 8.9 MG/DL (ref 8.7–10.5)
CHLORIDE SERPL-SCNC: 105 MMOL/L (ref 95–110)
CO2 SERPL-SCNC: 17 MMOL/L (ref 23–29)
CREAT SERPL-MCNC: 1.2 MG/DL (ref 0.5–1.4)
DIFFERENTIAL METHOD: ABNORMAL
EOSINOPHIL # BLD AUTO: 0.1 K/UL (ref 0–0.5)
EOSINOPHIL NFR BLD: 1.5 % (ref 0–8)
ERYTHROCYTE [DISTWIDTH] IN BLOOD BY AUTOMATED COUNT: 14.3 % (ref 11.5–14.5)
EST. GFR  (AFRICAN AMERICAN): >60 ML/MIN/1.73 M^2
EST. GFR  (NON AFRICAN AMERICAN): >60 ML/MIN/1.73 M^2
GLUCOSE SERPL-MCNC: 142 MG/DL (ref 70–110)
HCT VFR BLD AUTO: 23.5 % (ref 40–54)
HGB BLD-MCNC: 7.6 G/DL (ref 14–18)
IMM GRANULOCYTES # BLD AUTO: 0.09 K/UL (ref 0–0.04)
IMM GRANULOCYTES NFR BLD AUTO: 1.2 % (ref 0–0.5)
LYMPHOCYTES # BLD AUTO: 0.9 K/UL (ref 1–4.8)
LYMPHOCYTES NFR BLD: 11.8 % (ref 18–48)
MAGNESIUM SERPL-MCNC: 1.6 MG/DL (ref 1.6–2.6)
MCH RBC QN AUTO: 29.1 PG (ref 27–31)
MCHC RBC AUTO-ENTMCNC: 32.3 G/DL (ref 32–36)
MCV RBC AUTO: 90 FL (ref 82–98)
MONOCYTES # BLD AUTO: 0.7 K/UL (ref 0.3–1)
MONOCYTES NFR BLD: 9.7 % (ref 4–15)
NEUTROPHILS # BLD AUTO: 5.5 K/UL (ref 1.8–7.7)
NEUTROPHILS NFR BLD: 75.4 % (ref 38–73)
NRBC BLD-RTO: 0 /100 WBC
PHOSPHATE SERPL-MCNC: 4 MG/DL (ref 2.7–4.5)
PLATELET # BLD AUTO: 299 K/UL (ref 150–350)
PMV BLD AUTO: 9.3 FL (ref 9.2–12.9)
POTASSIUM SERPL-SCNC: 3.8 MMOL/L (ref 3.5–5.1)
PROT SERPL-MCNC: 5.8 G/DL (ref 6–8.4)
RBC # BLD AUTO: 2.61 M/UL (ref 4.6–6.2)
SODIUM SERPL-SCNC: 132 MMOL/L (ref 136–145)
WBC # BLD AUTO: 7.31 K/UL (ref 3.9–12.7)

## 2020-01-16 PROCEDURE — 63600175 PHARM REV CODE 636 W HCPCS: Performed by: INTERNAL MEDICINE

## 2020-01-16 PROCEDURE — 36415 COLL VENOUS BLD VENIPUNCTURE: CPT

## 2020-01-16 PROCEDURE — 25000003 PHARM REV CODE 250: Performed by: PHYSICIAN ASSISTANT

## 2020-01-16 PROCEDURE — 99239 HOSP IP/OBS DSCHRG MGMT >30: CPT | Mod: ,,, | Performed by: INTERNAL MEDICINE

## 2020-01-16 PROCEDURE — 25000003 PHARM REV CODE 250: Performed by: INTERNAL MEDICINE

## 2020-01-16 PROCEDURE — 85025 COMPLETE CBC W/AUTO DIFF WBC: CPT

## 2020-01-16 PROCEDURE — 80053 COMPREHEN METABOLIC PANEL: CPT

## 2020-01-16 PROCEDURE — 84100 ASSAY OF PHOSPHORUS: CPT

## 2020-01-16 PROCEDURE — 99239 PR HOSPITAL DISCHARGE DAY,>30 MIN: ICD-10-PCS | Mod: ,,, | Performed by: INTERNAL MEDICINE

## 2020-01-16 PROCEDURE — 83735 ASSAY OF MAGNESIUM: CPT

## 2020-01-16 RX ORDER — CIPROFLOXACIN 500 MG/1
500 TABLET ORAL EVERY 12 HOURS
Qty: 8 TABLET | Refills: 0 | Status: SHIPPED | OUTPATIENT
Start: 2020-01-16 | End: 2020-01-20

## 2020-01-16 RX ORDER — HEPARIN 100 UNIT/ML
3 SYRINGE INTRAVENOUS ONCE
Status: COMPLETED | OUTPATIENT
Start: 2020-01-16 | End: 2020-01-16

## 2020-01-16 RX ADMIN — HEPARIN 300 UNITS: 100 SYRINGE at 01:01

## 2020-01-16 RX ADMIN — PREDNISONE 5 MG: 5 TABLET ORAL at 08:01

## 2020-01-16 RX ADMIN — PIPERACILLIN AND TAZOBACTAM 4.5 G: 4; .5 INJECTION, POWDER, FOR SOLUTION INTRAVENOUS at 01:01

## 2020-01-16 RX ADMIN — LOPERAMIDE HYDROCHLORIDE 4 MG: 2 CAPSULE ORAL at 08:01

## 2020-01-16 RX ADMIN — DIPHENOXYLATE HYDROCHLORIDE AND ATROPINE SULFATE 2 TABLET: 2.5; .025 TABLET ORAL at 08:01

## 2020-01-16 RX ADMIN — PIPERACILLIN AND TAZOBACTAM 4.5 G: 4; .5 INJECTION, POWDER, FOR SOLUTION INTRAVENOUS at 08:01

## 2020-01-16 NOTE — PLAN OF CARE
01/16/20 0912   Post-Acute Status   Post-Acute Authorization Other   Other Status No Post-Acute Service Needs

## 2020-01-16 NOTE — PLAN OF CARE
01/16/20 1002   Final Note   Assessment Type Final Discharge Note   Anticipated Discharge Disposition Home   What phone number can be called within the next 1-3 days to see how you are doing after discharge?   (Kiko Gruber [phone])   Hospital Follow Up  Appt(s) scheduled? Yes   Discharge plans and expectations educations in teach back method with documentation complete? Yes   Right Care Referral Info   Post Acute Recommendation No Care

## 2020-01-16 NOTE — TELEPHONE ENCOUNTER
----- Message from Nita Cardoza MD sent at 1/16/2020 11:38 AM CST -----  Isaiah Beltran,    Can you please book this patient a follow up appointment with Dr. Styles in 3 weeks for stent removal? He is being discharged today. His red rubber catheter is out. Thank you

## 2020-01-16 NOTE — NURSING
Patient discharged, discharge instructions provided and explained. Patient waiting for antibiotics to finish infusing. Transport requested. Will continue to monitor patient.

## 2020-01-16 NOTE — DISCHARGE SUMMARY
[facility] Hospital Medicine  Discharge Summary      Patient Name: Kiko Gruber  MRN: [id number]  Admission Date: 1/13/2020  Hospital Length of Stay: 3 days  Discharge Date and Time: 1/16/2020  2:50 PM  Attending Physician: Juan J Marcum MD   Discharging Provider: Juan J Marcum MD  Primary Care Provider: Lala Juarez MD    [facility] Medicine [place] [facility] [place] Juan J Marcum MD    HPI:     54 y.o. Male with metastatic prostate cancer on chemo, s/p ex lap with small bowel resection and end ileostomy placement with ureterneocystostomy on 12/22/19 recently admitted for hyponatremia, hypomagnesemia, and JORGE ALBERTO. Evaluated by Gen Surg and Urology. Stable on Loperamide and Lomotil. Hyponatremia and JORGE ALBERTO improved. Electrolyte derangement likely due to high ostomy output, goal of </= 1L ostomy output/day. Patient presented today for post-op evaluation. Denies any pain, nausea, vomiting. Reports drinking >2L of Pedialyte and Gatorade per day to stay hydrated. Denies redness around or drainage from incisions. Doing well post-operatively until last night when he developed poor appetite and decreased intake. Found to have fever in Clinic associated with tachycardia. Wife () reports that patient had fever and rigors last night but would not seek care until his appointments today. Patient was sent to ED from Clinic.    * No surgery found *      Hospital Course:     Sepsis due to Pyelonephritis  - On admission, Febrile, tachycardic. No significant hypotension.  - Received 2.2 L IV fluids in ED. Zosyn started on 1/13.  - Blood culture NGTD and urine culture inconclusive with multiple organisms found.  - Clinically improving with Zosyn. Will discharge on ciprofloxacin 500 mg BID (end date- 1/20) for total abx course of 7 days.   - outpatient urology followup      JORGE ALBERTO- resolved   - Scr: 1.9 --> 1.2  - likely pre-renal   - Baseline sCr 1.0 - 1.4.  - Continue NS  - back to sCr baseline       Hyponatremia- improving   - Na: 121 --> 132  - likely due to hypovolemia   - Continuing NS.     Hypomagnesemia  - Chronic and severe.   - Repletion IV for now as oral MagOx expected to increase colostomy output.     Prostate cancer  - Plans to resume chemo after infection clears.     S/p small bowel resection and end ileostomy placement with ureteroneocystostomy  - Continuing current management with Lomotil and Imodium per previous Surgery recommendations.   - wound care consulted for urostomy and ileostomy care   - urology following. apprec recs  -- red rubber catheter removed from ileal conduit   - outpatient urology followup     Nicotine dependence  - Recommend cessation.       Consults:   Consults (From admission, onward)        Status Ordering Provider     Inpatient consult to Registered Dietitian/Nutritionist  Once     Provider:  (Not yet assigned)    Completed MIKHAIL MCCOLLUM     Inpatient consult to Urology  Once     Provider:  (Not yet assigned)    Completed MIKHAIL MCCOLLUM          Final Active Diagnoses:    Diagnosis Date Noted POA    PRINCIPAL PROBLEM:  Pyelonephritis [N12] 01/13/2020 Yes    Sepsis [A41.9] 01/13/2020 Yes    Prostate cancer [C61] 01/06/2020 Yes    JORGE ALBERTO (acute kidney injury) [N17.9] 01/06/2020 Yes    Nicotine dependence [F17.200] 12/30/2019 Yes    Hypomagnesemia [E83.42] 12/30/2019 Yes    Hyponatremia [E87.1] 12/30/2019 Yes      Problems Resolved During this Admission:      Discharged Condition: good    Disposition: Home or Self Care    Follow Up:    Patient Instructions:   No discharge procedures on file.  Medications:  Reconciled Home Medications:      Medication List      START taking these medications    ciprofloxacin HCl 500 MG tablet  Commonly known as:  CIPRO  Take 1 tablet (500 mg total) by mouth every 12 (twelve) hours. for 4 days        CONTINUE taking these medications    diphenoxylate-atropine 2.5-0.025 mg 2.5-0.025 mg per tablet  Commonly known as:   LOMOTIL  Take 2 tablets by mouth 2 (two) times daily.     HYDROcodone-acetaminophen  mg per tablet  Commonly known as:  NORCO  TK 1 T PO  Q 6 H PRN P     loperamide 2 mg capsule  Commonly known as:  IMODIUM  Take 2 capsules (4 mg total) by mouth 4 (four) times daily.     miconazole NITRATE 2 % 2 % top powder  Commonly known as:  MICOTIN  Apply topically as needed (for peristomal excoriation).     nicotine 14 mg/24 hr  Commonly known as:  NICODERM CQ  Place 1 patch onto the skin once daily. for 14 days     omeprazole 40 MG capsule  Commonly known as:  PRILOSEC  Take 40 mg by mouth once daily.     oxyCODONE 5 MG immediate release tablet  Commonly known as:  ROXICODONE  Take 1 tablet (5 mg total) by mouth every 4 (four) hours as needed.     predniSONE 5 MG tablet  Commonly known as:  DELTASONE  Take 5 mg by mouth 2 (two) times daily.     scopolamine 1.3-1.5 mg (1 mg over 3 days)  Commonly known as:  TRANSDERM-SCOP  Place 1 patch onto the skin Every 3 (three) days.            Significant Diagnostic Studies: Labs:   CMP   Recent Labs   Lab 01/15/20  0503 01/16/20  0339   * 132*   K 4.1 3.8   CL 99 105   CO2 19* 17*   * 142*   BUN 17 19   CREATININE 1.4 1.2   CALCIUM 8.9 8.9   PROT 6.1 5.8*   ALBUMIN 2.4* 2.3*   BILITOT 0.2 <0.1*   ALKPHOS 91 80   AST 8* 8*   ALT 21 19   ANIONGAP 9 10   ESTGFRAFRICA >60.0 >60.0   EGFRNONAA 56.2* >60.0   , CBC   Recent Labs   Lab 01/15/20  0503 01/16/20  0339   WBC 6.39 7.31   HGB 8.0* 7.6*   HCT 23.6* 23.5*    299   , Troponin   Recent Labs   Lab 01/13/20  1305   TROPONINI <0.006    and A1C: No results for input(s): HGBA1C in the last 4320 hours.    Pending Diagnostic Studies:     None        CT Abdomen/Pelvis (1/12/20):  Impression       Moderate degree of perinephric stranding around the left kidney with striated nephrograms on excretory phase most suggestive of acute pyelonephritis.  Consider correlation with urinalysis and urine cultures.    Status post  cystectomy, prostatectomy, partial small-bowel resection with creation of ileal conduit and ureteroneocystostomy in this patient with history of prostate cancer.  Double-J stent noted within the left renal pelvis extending through the ileal conduit and is external to the patient along with another catheter within the ileal conduit itself extending externally.    Minimal right-sided hydronephrosis with minimal dilatation of the proximal right ureter.    Large L4 sclerotic lesion, concerning for metastatic disease.  Correlation with prior examinations and bone scan if available is recommended.       CXR (1/13/20):  Impression       No significant cardiopulmonary abnormality seen.  Left chest port noted.           Indwelling Lines/Drains at time of discharge:   Lines/Drains/Airways     Central Venous Catheter Line                 Port A Cath Single Lumen -- days          Drain                 Ileostomy 12/22/19 1704 Standard ([place], end) LUQ 24 days         Urostomy 12/22/19 1654 ileal conduit RUQ 24 days                Time spent on the discharge of patient: 45 minutes  Patient was seen and examined on the date of discharge and determined to be suitable for discharge.         Juan J Marcum MD  Department of Hospital Medicine  [facility]

## 2020-01-16 NOTE — PROGRESS NOTES
Urology Progress Note    JORGE ALBERTO resolved overall clinical picture improved.     No complaints. He is ready to go home.    Red rubber catheter removed with no complications.     We will arrange follow up.    Please call with questions.    Nita Cardoza MD

## 2020-01-18 LAB
BACTERIA BLD CULT: NORMAL
BACTERIA BLD CULT: NORMAL

## 2020-01-28 ENCOUNTER — TELEPHONE (OUTPATIENT)
Dept: WOUND CARE | Facility: CLINIC | Age: 55
End: 2020-01-28

## 2020-01-28 NOTE — TELEPHONE ENCOUNTER
----- Message from Kat Turner sent at 1/27/2020  2:15 PM CST -----  Contact: [phone]  Calling to speak with Nancy Spears about stoma issues. Please call

## 2020-01-28 NOTE — TELEPHONE ENCOUNTER
Tried wife and unable to reach by phone, reached pt but he does not know what her questions were, I told him I will try back tomorrow as she is in [place]

## 2020-01-29 ENCOUNTER — HISTORICAL (OUTPATIENT)
Dept: ADMINISTRATIVE | Facility: HOSPITAL | Age: 55
End: 2020-01-29

## 2020-01-29 LAB
ABS NEUT (OLG): 6.16 X10(3)/MCL (ref 2.1–9.2)
ALBUMIN SERPL-MCNC: 3.7 GM/DL (ref 3.4–5)
ALBUMIN/GLOB SERPL: 0.8 RATIO (ref 1.1–2)
ALP SERPL-CCNC: 111 UNIT/L (ref 50–136)
ALT SERPL-CCNC: 26 UNIT/L (ref 12–78)
AST SERPL-CCNC: 5 UNIT/L (ref 15–37)
BASOPHILS # BLD AUTO: 0.1 X10(3)/MCL (ref 0–0.2)
BASOPHILS NFR BLD AUTO: 1 %
BILIRUB SERPL-MCNC: 0.2 MG/DL (ref 0.2–1)
BILIRUBIN DIRECT+TOT PNL SERPL-MCNC: 0.1 MG/DL (ref 0–0.5)
BILIRUBIN DIRECT+TOT PNL SERPL-MCNC: 0.1 MG/DL (ref 0–0.8)
BUN SERPL-MCNC: 42 MG/DL (ref 7–18)
CALCIUM SERPL-MCNC: 9.4 MG/DL (ref 8.5–10.1)
CHLORIDE SERPL-SCNC: 104 MMOL/L (ref 98–107)
CO2 SERPL-SCNC: 15 MMOL/L (ref 21–32)
CREAT SERPL-MCNC: 1.75 MG/DL (ref 0.7–1.3)
EOSINOPHIL # BLD AUTO: 0.3 X10(3)/MCL (ref 0–0.9)
EOSINOPHIL NFR BLD AUTO: 3.3 %
ERYTHROCYTE [DISTWIDTH] IN BLOOD BY AUTOMATED COUNT: 14.7 % (ref 11.5–17)
GLOBULIN SER-MCNC: 4.5 GM/DL (ref 2.4–3.5)
GLUCOSE SERPL-MCNC: 93 MG/DL (ref 74–106)
HCT VFR BLD AUTO: 36.2 % (ref 42–52)
HGB BLD-MCNC: 11.7 GM/DL (ref 14–18)
LYMPHOCYTES # BLD AUTO: 1.6 X10(3)/MCL (ref 0.6–4.6)
LYMPHOCYTES NFR BLD AUTO: 17.9 %
MCH RBC QN AUTO: 29 PG (ref 27–31)
MCHC RBC AUTO-ENTMCNC: 32.3 GM/DL (ref 33–36)
MCV RBC AUTO: 89.8 FL (ref 80–94)
MONOCYTES # BLD AUTO: 0.6 X10(3)/MCL (ref 0.1–1.3)
MONOCYTES NFR BLD AUTO: 6.8 %
NEUTROPHILS # BLD AUTO: 6.2 X10(3)/MCL (ref 2.1–9.2)
NEUTROPHILS NFR BLD AUTO: 70.4 %
PLATELET # BLD AUTO: 254 X10(3)/MCL (ref 130–400)
PMV BLD AUTO: 8.2 FL (ref 9.4–12.4)
POTASSIUM SERPL-SCNC: 4.3 MMOL/L (ref 3.5–5.1)
PROT SERPL-MCNC: 8.2 GM/DL (ref 6.4–8.2)
RBC # BLD AUTO: 4.03 X10(6)/MCL (ref 4.7–6.1)
SODIUM SERPL-SCNC: 133 MMOL/L (ref 136–145)
WBC # SPEC AUTO: 8.8 X10(3)/MCL (ref 4.5–11.5)

## 2021-07-01 ENCOUNTER — PATIENT MESSAGE (OUTPATIENT)
Dept: ADMINISTRATIVE | Facility: OTHER | Age: 56
End: 2021-07-01

## 2023-03-03 NOTE — PROGRESS NOTES
Plan of care reviewed with patient who verbalized understanding. AAOx4. VSS on room air. NPO. No complaints of nausea. PRN Dilaudid given for pain. Right ileostomy w/ bowel sweat. Left abd BE drain w/ serosanguinous fluid. Right urostomy tube w/ clear yellow urine. Wife at the bedside. Call light within reach. Bed in the lowest position. Patient mostly slept in between care. No acute events this shift. WCTM.    Pt reported his home BP reading was very high and he is feeling funny pt denies any pain or dizziness, pt has started a new bp med 2 weeks ago lisinopril/hctz 20-12.5

## (undated) DEVICE — GUIDE WIRE MOTION .035 X 150CM

## (undated) DEVICE — SUT VICRYL CTD 2-0 GI 27 SH

## (undated) DEVICE — SUT VICRYL PLUS 3-0 SH 18IN

## (undated) DEVICE — SEE MEDLINE ITEM 156902

## (undated) DEVICE — BLADE 4 INCH EDGE UN-INS

## (undated) DEVICE — SUT 3/0 30IN ETHILON BLK M

## (undated) DEVICE — CUTTER PROXIMATE BLUE 75MM

## (undated) DEVICE — POUCH SENSURA 1PC EXT W/FILTER

## (undated) DEVICE — DRAPE INCISE IOBAN 2 23X17IN

## (undated) DEVICE — SEALER LIGASURE IMPACT 18CM

## (undated) DEVICE — SUT SILK 3-0 STRANDS 30IN

## (undated) DEVICE — SEE MEDLINE ITEM 146417

## (undated) DEVICE — DRAPE ABDOMINAL TIBURON 14X11

## (undated) DEVICE — DRESSING ADH ISLAND 3.6 X 14

## (undated) DEVICE — SUT SILK 2-0 STRANDS 30IN

## (undated) DEVICE — STAPLER SKIN PROXIMATE WIDE

## (undated) DEVICE — SUT 0 18IN COATED VICRYL V

## (undated) DEVICE — TOWEL OR XRAY WHITE 17X26IN

## (undated) DEVICE — DRAIN CHANNEL ROUND 19FR

## (undated) DEVICE — SLEEVE SCD EXPRESS CALF MEDIUM

## (undated) DEVICE — SUT MONOCRYL 4-0 UND RB-1

## (undated) DEVICE — EVACUATOR WOUND BULB 100CC

## (undated) DEVICE — ELECTRODE REM PLYHSV RETURN 9

## (undated) DEVICE — CATH POLLACK OPEN-END FLEXI-TI

## (undated) DEVICE — SUT 1 48IN PDS II VIO MONO